# Patient Record
Sex: FEMALE | Race: WHITE | NOT HISPANIC OR LATINO
[De-identification: names, ages, dates, MRNs, and addresses within clinical notes are randomized per-mention and may not be internally consistent; named-entity substitution may affect disease eponyms.]

---

## 2017-01-05 ENCOUNTER — RX RENEWAL (OUTPATIENT)
Age: 77
End: 2017-01-05

## 2017-01-05 ENCOUNTER — APPOINTMENT (OUTPATIENT)
Dept: FAMILY MEDICINE | Facility: CLINIC | Age: 77
End: 2017-01-05

## 2017-01-05 DIAGNOSIS — Z87.891 PERSONAL HISTORY OF NICOTINE DEPENDENCE: ICD-10-CM

## 2017-01-05 DIAGNOSIS — R05 COUGH: ICD-10-CM

## 2017-01-05 DIAGNOSIS — Z00.00 ENCOUNTER FOR GENERAL ADULT MEDICAL EXAMINATION W/OUT ABNORMAL FINDINGS: ICD-10-CM

## 2017-01-30 ENCOUNTER — RX RENEWAL (OUTPATIENT)
Age: 77
End: 2017-01-30

## 2017-06-08 ENCOUNTER — RX RENEWAL (OUTPATIENT)
Age: 77
End: 2017-06-08

## 2017-06-13 ENCOUNTER — APPOINTMENT (OUTPATIENT)
Dept: FAMILY MEDICINE | Facility: CLINIC | Age: 77
End: 2017-06-13

## 2017-06-15 LAB
HBA1C MFR BLD HPLC: 6.3
LDLC SERPL CALC-MCNC: 87

## 2017-07-06 ENCOUNTER — APPOINTMENT (OUTPATIENT)
Dept: FAMILY MEDICINE | Facility: CLINIC | Age: 77
End: 2017-07-06

## 2017-07-06 VITALS
HEIGHT: 64 IN | BODY MASS INDEX: 31.07 KG/M2 | HEART RATE: 78 BPM | WEIGHT: 182 LBS | SYSTOLIC BLOOD PRESSURE: 120 MMHG | RESPIRATION RATE: 16 BRPM | DIASTOLIC BLOOD PRESSURE: 60 MMHG

## 2017-07-06 DIAGNOSIS — Z82.62 FAMILY HISTORY OF OSTEOPOROSIS: ICD-10-CM

## 2017-07-06 DIAGNOSIS — K76.0 FATTY (CHANGE OF) LIVER, NOT ELSEWHERE CLASSIFIED: ICD-10-CM

## 2017-07-06 DIAGNOSIS — Z87.898 PERSONAL HISTORY OF OTHER SPECIFIED CONDITIONS: ICD-10-CM

## 2017-07-06 DIAGNOSIS — Z80.42 FAMILY HISTORY OF MALIGNANT NEOPLASM OF PROSTATE: ICD-10-CM

## 2017-07-06 DIAGNOSIS — Z87.448 PERSONAL HISTORY OF OTHER DISEASES OF URINARY SYSTEM: ICD-10-CM

## 2017-07-06 DIAGNOSIS — Z82.49 FAMILY HISTORY OF ISCHEMIC HEART DISEASE AND OTHER DISEASES OF THE CIRCULATORY SYSTEM: ICD-10-CM

## 2017-08-09 ENCOUNTER — RX RENEWAL (OUTPATIENT)
Age: 77
End: 2017-08-09

## 2017-08-21 ENCOUNTER — RX RENEWAL (OUTPATIENT)
Age: 77
End: 2017-08-21

## 2017-09-20 ENCOUNTER — APPOINTMENT (OUTPATIENT)
Dept: CARDIOLOGY | Facility: CLINIC | Age: 77
End: 2017-09-20
Payer: MEDICARE

## 2017-09-20 ENCOUNTER — NON-APPOINTMENT (OUTPATIENT)
Age: 77
End: 2017-09-20

## 2017-09-20 VITALS
DIASTOLIC BLOOD PRESSURE: 72 MMHG | OXYGEN SATURATION: 94 % | HEART RATE: 93 BPM | SYSTOLIC BLOOD PRESSURE: 125 MMHG | HEIGHT: 64 IN

## 2017-09-20 PROCEDURE — 93224 XTRNL ECG REC UP TO 48 HRS: CPT | Mod: 59

## 2017-09-20 PROCEDURE — 93000 ELECTROCARDIOGRAM COMPLETE: CPT | Mod: 59

## 2017-09-20 PROCEDURE — 99215 OFFICE O/P EST HI 40 MIN: CPT

## 2017-10-03 ENCOUNTER — APPOINTMENT (OUTPATIENT)
Dept: CARDIOLOGY | Facility: CLINIC | Age: 77
End: 2017-10-03
Payer: MEDICARE

## 2017-10-03 PROCEDURE — 93306 TTE W/DOPPLER COMPLETE: CPT

## 2017-10-12 ENCOUNTER — MEDICATION RENEWAL (OUTPATIENT)
Age: 77
End: 2017-10-12

## 2017-10-20 ENCOUNTER — RX RENEWAL (OUTPATIENT)
Age: 77
End: 2017-10-20

## 2017-11-07 ENCOUNTER — CLINICAL ADVICE (OUTPATIENT)
Age: 77
End: 2017-11-07

## 2017-11-07 ENCOUNTER — APPOINTMENT (OUTPATIENT)
Dept: CARDIOLOGY | Facility: CLINIC | Age: 77
End: 2017-11-07
Payer: MEDICARE

## 2017-11-07 PROCEDURE — A9500: CPT

## 2017-11-07 PROCEDURE — 93015 CV STRESS TEST SUPVJ I&R: CPT

## 2017-11-07 PROCEDURE — 78452 HT MUSCLE IMAGE SPECT MULT: CPT

## 2017-11-16 ENCOUNTER — OUTPATIENT (OUTPATIENT)
Dept: OUTPATIENT SERVICES | Facility: HOSPITAL | Age: 77
LOS: 1 days | End: 2017-11-16
Payer: MEDICARE

## 2017-11-16 VITALS — HEIGHT: 64 IN | WEIGHT: 173.06 LBS

## 2017-11-16 VITALS
HEART RATE: 84 BPM | OXYGEN SATURATION: 94 % | RESPIRATION RATE: 18 BRPM | HEIGHT: 64 IN | DIASTOLIC BLOOD PRESSURE: 62 MMHG | TEMPERATURE: 98 F | SYSTOLIC BLOOD PRESSURE: 143 MMHG | WEIGHT: 173.06 LBS

## 2017-11-16 DIAGNOSIS — Z98.89 OTHER SPECIFIED POSTPROCEDURAL STATES: Chronic | ICD-10-CM

## 2017-11-16 DIAGNOSIS — Z01.810 ENCOUNTER FOR PREPROCEDURAL CARDIOVASCULAR EXAMINATION: ICD-10-CM

## 2017-11-16 DIAGNOSIS — Z98.890 OTHER SPECIFIED POSTPROCEDURAL STATES: Chronic | ICD-10-CM

## 2017-11-16 LAB
ANION GAP SERPL CALC-SCNC: 11 MMOL/L — SIGNIFICANT CHANGE UP (ref 5–17)
APTT BLD: 30.9 SEC — SIGNIFICANT CHANGE UP (ref 27.5–37.4)
BASOPHILS # BLD AUTO: 0 K/UL — SIGNIFICANT CHANGE UP (ref 0–0.2)
BASOPHILS NFR BLD AUTO: 0.4 % — SIGNIFICANT CHANGE UP (ref 0–2)
BUN SERPL-MCNC: 16 MG/DL — SIGNIFICANT CHANGE UP (ref 8–20)
CALCIUM SERPL-MCNC: 9.7 MG/DL — SIGNIFICANT CHANGE UP (ref 8.6–10.2)
CHLORIDE SERPL-SCNC: 104 MMOL/L — SIGNIFICANT CHANGE UP (ref 98–107)
CO2 SERPL-SCNC: 26 MMOL/L — SIGNIFICANT CHANGE UP (ref 22–29)
CREAT SERPL-MCNC: 1.02 MG/DL — SIGNIFICANT CHANGE UP (ref 0.5–1.3)
EOSINOPHIL # BLD AUTO: 0.1 K/UL — SIGNIFICANT CHANGE UP (ref 0–0.5)
EOSINOPHIL NFR BLD AUTO: 1.1 % — SIGNIFICANT CHANGE UP (ref 0–6)
GLUCOSE SERPL-MCNC: 114 MG/DL — SIGNIFICANT CHANGE UP (ref 70–115)
HCT VFR BLD CALC: 39.1 % — SIGNIFICANT CHANGE UP (ref 37–47)
HGB BLD-MCNC: 13 G/DL — SIGNIFICANT CHANGE UP (ref 12–16)
INR BLD: 0.98 RATIO — SIGNIFICANT CHANGE UP (ref 0.88–1.16)
LYMPHOCYTES # BLD AUTO: 1.3 K/UL — SIGNIFICANT CHANGE UP (ref 1–4.8)
LYMPHOCYTES # BLD AUTO: 23.4 % — SIGNIFICANT CHANGE UP (ref 20–55)
MCHC RBC-ENTMCNC: 31 PG — SIGNIFICANT CHANGE UP (ref 27–31)
MCHC RBC-ENTMCNC: 33.2 G/DL — SIGNIFICANT CHANGE UP (ref 32–36)
MCV RBC AUTO: 93.1 FL — SIGNIFICANT CHANGE UP (ref 81–99)
MONOCYTES # BLD AUTO: 0.5 K/UL — SIGNIFICANT CHANGE UP (ref 0–0.8)
MONOCYTES NFR BLD AUTO: 9.2 % — SIGNIFICANT CHANGE UP (ref 3–10)
NEUTROPHILS # BLD AUTO: 3.6 K/UL — SIGNIFICANT CHANGE UP (ref 1.8–8)
NEUTROPHILS NFR BLD AUTO: 65.7 % — SIGNIFICANT CHANGE UP (ref 37–73)
PLATELET # BLD AUTO: 188 K/UL — SIGNIFICANT CHANGE UP (ref 150–400)
POTASSIUM SERPL-MCNC: 4.5 MMOL/L — SIGNIFICANT CHANGE UP (ref 3.5–5.3)
POTASSIUM SERPL-SCNC: 4.5 MMOL/L — SIGNIFICANT CHANGE UP (ref 3.5–5.3)
PROTHROM AB SERPL-ACNC: 10.8 SEC — SIGNIFICANT CHANGE UP (ref 9.8–12.7)
RBC # BLD: 4.2 M/UL — LOW (ref 4.4–5.2)
RBC # FLD: 13.6 % — SIGNIFICANT CHANGE UP (ref 11–15.6)
SODIUM SERPL-SCNC: 141 MMOL/L — SIGNIFICANT CHANGE UP (ref 135–145)
WBC # BLD: 5.5 K/UL — SIGNIFICANT CHANGE UP (ref 4.8–10.8)
WBC # FLD AUTO: 5.5 K/UL — SIGNIFICANT CHANGE UP (ref 4.8–10.8)

## 2017-11-16 PROCEDURE — 93010 ELECTROCARDIOGRAM REPORT: CPT

## 2017-11-16 PROCEDURE — 85730 THROMBOPLASTIN TIME PARTIAL: CPT

## 2017-11-16 PROCEDURE — 80048 BASIC METABOLIC PNL TOTAL CA: CPT

## 2017-11-16 PROCEDURE — 36415 COLL VENOUS BLD VENIPUNCTURE: CPT

## 2017-11-16 PROCEDURE — G0463: CPT

## 2017-11-16 PROCEDURE — 85027 COMPLETE CBC AUTOMATED: CPT

## 2017-11-16 PROCEDURE — 85610 PROTHROMBIN TIME: CPT

## 2017-11-16 PROCEDURE — 93005 ELECTROCARDIOGRAM TRACING: CPT

## 2017-11-16 NOTE — H&P PST ADULT - ASSESSMENT
76 yo female with h/o COPD, with c/o MENCHACA and abnormal stress test referred for LHC to R/O CAD  -NPO after midnight prior to procedure  -Proceed with LHC as planned

## 2017-11-16 NOTE — H&P PST ADULT - RS GEN PE MLT RESP DETAILS PC
no rhonchi/normal/breath sounds equal/no rales/clear to auscultation bilaterally/respirations non-labored

## 2017-11-16 NOTE — H&P PST ADULT - HISTORY OF PRESENT ILLNESS
78 yo female with h/o COPD, HLD who presents for PST for C. She has MENCHACA and was found to have an abnormal Echo and was referred for stress test which was abnormal.  She reports her SOB is worsening with exertion, she does have COPD, but does state she is more SOB lately. No chest tightness.    Echo: 10/6/2017: LVEF 45-50%. Normal right ventricular size and function. Mild mitral valve regurgitation. Global diffuse hypokinesis. Grade 1 diastolic dysfunction.    Stress test: LVEF 60%. Moderate ischemia in RCA territory. The left ventricle was normal in size. Inability to exercise due to decrease exercise capacity and COPD.

## 2017-11-16 NOTE — H&P PST ADULT - PMH
Abnormal stress test  11-7-2017  CAD (coronary artery disease)    Diastolic dysfunction  grade 1  Emphysema    GERD (gastroesophageal reflux disease)    HLD (hyperlipidemia)    Hyperlipidemia    Incisional hernia    MR (mitral regurgitation)  mild  Obese

## 2017-11-16 NOTE — ASU PATIENT PROFILE, ADULT - PMH
Abnormal stress test  11-7-2017  CAD (coronary artery disease)    Diastolic dysfunction  grade 1  Emphysema    HLD (hyperlipidemia)    Hyperlipidemia    Incisional hernia    MR (mitral regurgitation)  mild  Obese Abnormal stress test  11-7-2017  CAD (coronary artery disease)    Diastolic dysfunction  grade 1  Emphysema    GERD (gastroesophageal reflux disease)    HLD (hyperlipidemia)    Hyperlipidemia    Incisional hernia    MR (mitral regurgitation)  mild  Obese

## 2017-11-21 ENCOUNTER — OUTPATIENT (OUTPATIENT)
Dept: OUTPATIENT SERVICES | Facility: HOSPITAL | Age: 77
LOS: 1 days | End: 2017-11-21
Payer: MEDICARE

## 2017-11-21 ENCOUNTER — TRANSCRIPTION ENCOUNTER (OUTPATIENT)
Age: 77
End: 2017-11-21

## 2017-11-21 VITALS
OXYGEN SATURATION: 97 % | HEART RATE: 64 BPM | SYSTOLIC BLOOD PRESSURE: 147 MMHG | DIASTOLIC BLOOD PRESSURE: 62 MMHG | RESPIRATION RATE: 16 BRPM

## 2017-11-21 VITALS
HEART RATE: 69 BPM | RESPIRATION RATE: 16 BRPM | SYSTOLIC BLOOD PRESSURE: 147 MMHG | TEMPERATURE: 98 F | DIASTOLIC BLOOD PRESSURE: 67 MMHG | OXYGEN SATURATION: 97 %

## 2017-11-21 DIAGNOSIS — Z01.810 ENCOUNTER FOR PREPROCEDURAL CARDIOVASCULAR EXAMINATION: ICD-10-CM

## 2017-11-21 DIAGNOSIS — R06.09 OTHER FORMS OF DYSPNEA: ICD-10-CM

## 2017-11-21 DIAGNOSIS — Z98.89 OTHER SPECIFIED POSTPROCEDURAL STATES: Chronic | ICD-10-CM

## 2017-11-21 DIAGNOSIS — Z98.890 OTHER SPECIFIED POSTPROCEDURAL STATES: Chronic | ICD-10-CM

## 2017-11-21 PROCEDURE — 93458 L HRT ARTERY/VENTRICLE ANGIO: CPT

## 2017-11-21 PROCEDURE — 93458 L HRT ARTERY/VENTRICLE ANGIO: CPT | Mod: 26

## 2017-11-21 PROCEDURE — C1887: CPT

## 2017-11-21 PROCEDURE — 99152 MOD SED SAME PHYS/QHP 5/>YRS: CPT

## 2017-11-21 PROCEDURE — C1894: CPT

## 2017-11-21 PROCEDURE — C1769: CPT

## 2017-11-21 RX ORDER — ASPIRIN/CALCIUM CARB/MAGNESIUM 324 MG
81 TABLET ORAL ONCE
Qty: 0 | Refills: 0 | Status: COMPLETED | OUTPATIENT
Start: 2017-11-21 | End: 2017-11-21

## 2017-11-21 NOTE — DISCHARGE NOTE ADULT - MEDICATION SUMMARY - MEDICATIONS TO TAKE
I will START or STAY ON the medications listed below when I get home from the hospital:    aspirin 81 mg oral tablet  -- 1 tab(s) by mouth once a day  -- Indication: For antiplatelet    atorvastatin 20 mg oral tablet  -- 1 tab(s) by mouth once a day  -- Indication: For hyperlipidemia    alendronate 35 mg oral tablet  -- 1 tab(s) by mouth once a week  -- Indication: For Osteoporosis    Advair Diskus 250 mcg-50 mcg inhalation powder  -- 1 puff(s) inhaled 2 times a day  -- Indication: For copd    omeprazole 20 mg oral delayed release capsule  -- 1 cap(s) by mouth once a day  -- Indication: For GERD    Vitamin D2 50,000 intl units (1.25 mg) oral capsule  -- 1 cap(s) by mouth once a week  -- Indication: For supplement

## 2017-11-21 NOTE — DISCHARGE NOTE ADULT - CARE PROVIDER_API CALL
Aleja Taylor), Cardiology; Internal Medicine  [address] [id number]  Phone: [phone]  Fax: [phone]

## 2017-11-21 NOTE — DISCHARGE NOTE ADULT - PLAN OF CARE
Remain free of worsening CAD Restricted use with no heavy lifting of affected arm for 48 hours.  No submerging the arm in water for 48 hours.  You may start showering today.  Call your doctor for any bleeding, swelling, loss of sensation in the hand or fingers, or fingers turning blue.  If heavy bleeding or large lumps form, hold pressure at the spot and come to the Emergency Room.  Follow up with Dr. Taylor in one to two weeks.

## 2017-11-21 NOTE — DISCHARGE NOTE ADULT - PATIENT PORTAL LINK FT
“You can access the [facility] Patient Portal, offered by [facility], by registering with the following website: [email or web address]”

## 2017-11-21 NOTE — DISCHARGE NOTE ADULT - NS AS ACTIVITY OBS
Walking-Outdoors allowed/Showering allowed/Do not make important decisions/Stairs allowed/No Heavy lifting/straining/Walking-Indoors allowed

## 2017-11-21 NOTE — DISCHARGE NOTE ADULT - CARE PLAN
Principal Discharge DX:	CAD (coronary artery disease)  Goal:	Remain free of worsening CAD  Instructions for follow-up, activity and diet:	Restricted use with no heavy lifting of affected arm for 48 hours.  No submerging the arm in water for 48 hours.  You may start showering today.  Call your doctor for any bleeding, swelling, loss of sensation in the hand or fingers, or fingers turning blue.  If heavy bleeding or large lumps form, hold pressure at the spot and come to the Emergency Room.  Follow up with Dr. Taylor in one to two weeks.

## 2017-11-29 ENCOUNTER — RX RENEWAL (OUTPATIENT)
Age: 77
End: 2017-11-29

## 2018-01-02 ENCOUNTER — APPOINTMENT (OUTPATIENT)
Dept: FAMILY MEDICINE | Facility: CLINIC | Age: 78
End: 2018-01-02
Payer: MEDICARE

## 2018-01-02 PROCEDURE — 36415 COLL VENOUS BLD VENIPUNCTURE: CPT

## 2018-01-03 LAB
HBA1C MFR BLD HPLC: 6.3
LDLC SERPL CALC-MCNC: 58

## 2018-02-23 ENCOUNTER — MEDICATION RENEWAL (OUTPATIENT)
Age: 78
End: 2018-02-23

## 2018-06-28 ENCOUNTER — RX RENEWAL (OUTPATIENT)
Age: 78
End: 2018-06-28

## 2018-07-17 ENCOUNTER — APPOINTMENT (OUTPATIENT)
Dept: SURGERY | Facility: CLINIC | Age: 78
End: 2018-07-17
Payer: MEDICARE

## 2018-07-17 VITALS
DIASTOLIC BLOOD PRESSURE: 72 MMHG | HEIGHT: 64 IN | SYSTOLIC BLOOD PRESSURE: 140 MMHG | HEART RATE: 70 BPM | WEIGHT: 177 LBS | BODY MASS INDEX: 30.22 KG/M2 | RESPIRATION RATE: 15 BRPM

## 2018-07-17 PROCEDURE — 99203 OFFICE O/P NEW LOW 30 MIN: CPT

## 2018-07-17 PROCEDURE — 99213 OFFICE O/P EST LOW 20 MIN: CPT

## 2018-07-23 PROBLEM — K21.9 GASTRO-ESOPHAGEAL REFLUX DISEASE WITHOUT ESOPHAGITIS: Chronic | Status: ACTIVE | Noted: 2017-11-16

## 2018-07-23 PROBLEM — I34.0 NONRHEUMATIC MITRAL (VALVE) INSUFFICIENCY: Chronic | Status: ACTIVE | Noted: 2017-11-16

## 2018-07-23 PROBLEM — E78.5 HYPERLIPIDEMIA, UNSPECIFIED: Chronic | Status: ACTIVE | Noted: 2017-11-16

## 2018-07-23 PROBLEM — I25.10 ATHEROSCLEROTIC HEART DISEASE OF NATIVE CORONARY ARTERY WITHOUT ANGINA PECTORIS: Chronic | Status: ACTIVE | Noted: 2017-11-16

## 2018-07-30 ENCOUNTER — OUTPATIENT (OUTPATIENT)
Dept: OUTPATIENT SERVICES | Facility: HOSPITAL | Age: 78
LOS: 1 days | End: 2018-07-30
Payer: MEDICARE

## 2018-07-30 VITALS
DIASTOLIC BLOOD PRESSURE: 75 MMHG | RESPIRATION RATE: 16 BRPM | HEART RATE: 78 BPM | TEMPERATURE: 98 F | HEIGHT: 64 IN | WEIGHT: 171.96 LBS | OXYGEN SATURATION: 96 % | SYSTOLIC BLOOD PRESSURE: 158 MMHG

## 2018-07-30 DIAGNOSIS — Z98.89 OTHER SPECIFIED POSTPROCEDURAL STATES: Chronic | ICD-10-CM

## 2018-07-30 DIAGNOSIS — I25.10 ATHEROSCLEROTIC HEART DISEASE OF NATIVE CORONARY ARTERY WITHOUT ANGINA PECTORIS: ICD-10-CM

## 2018-07-30 DIAGNOSIS — Z98.890 OTHER SPECIFIED POSTPROCEDURAL STATES: Chronic | ICD-10-CM

## 2018-07-30 DIAGNOSIS — J43.9 EMPHYSEMA, UNSPECIFIED: ICD-10-CM

## 2018-07-30 DIAGNOSIS — K80.20 CALCULUS OF GALLBLADDER WITHOUT CHOLECYSTITIS WITHOUT OBSTRUCTION: ICD-10-CM

## 2018-07-30 DIAGNOSIS — Z01.818 ENCOUNTER FOR OTHER PREPROCEDURAL EXAMINATION: ICD-10-CM

## 2018-07-30 DIAGNOSIS — Z29.9 ENCOUNTER FOR PROPHYLACTIC MEASURES, UNSPECIFIED: ICD-10-CM

## 2018-07-30 LAB
ALBUMIN SERPL ELPH-MCNC: 4.6 G/DL — SIGNIFICANT CHANGE UP (ref 3.3–5)
ALP SERPL-CCNC: 75 U/L — SIGNIFICANT CHANGE UP (ref 40–120)
ALT FLD-CCNC: 25 U/L — SIGNIFICANT CHANGE UP (ref 10–45)
ANION GAP SERPL CALC-SCNC: 14 MMOL/L — SIGNIFICANT CHANGE UP (ref 5–17)
AST SERPL-CCNC: 25 U/L — SIGNIFICANT CHANGE UP (ref 10–40)
BILIRUB SERPL-MCNC: 0.5 MG/DL — SIGNIFICANT CHANGE UP (ref 0.2–1.2)
BUN SERPL-MCNC: 18 MG/DL — SIGNIFICANT CHANGE UP (ref 7–23)
CALCIUM SERPL-MCNC: 10.3 MG/DL — SIGNIFICANT CHANGE UP (ref 8.4–10.5)
CHLORIDE SERPL-SCNC: 105 MMOL/L — SIGNIFICANT CHANGE UP (ref 96–108)
CO2 SERPL-SCNC: 25 MMOL/L — SIGNIFICANT CHANGE UP (ref 22–31)
CREAT SERPL-MCNC: 1.14 MG/DL — SIGNIFICANT CHANGE UP (ref 0.5–1.3)
GLUCOSE SERPL-MCNC: 91 MG/DL — SIGNIFICANT CHANGE UP (ref 70–99)
HCT VFR BLD CALC: 43.1 % — SIGNIFICANT CHANGE UP (ref 34.5–45)
HGB BLD-MCNC: 14.2 G/DL — SIGNIFICANT CHANGE UP (ref 11.5–15.5)
MCHC RBC-ENTMCNC: 30.8 PG — SIGNIFICANT CHANGE UP (ref 27–34)
MCHC RBC-ENTMCNC: 32.9 GM/DL — SIGNIFICANT CHANGE UP (ref 32–36)
MCV RBC AUTO: 93.5 FL — SIGNIFICANT CHANGE UP (ref 80–100)
PLATELET # BLD AUTO: 221 K/UL — SIGNIFICANT CHANGE UP (ref 150–400)
POTASSIUM SERPL-MCNC: 4.1 MMOL/L — SIGNIFICANT CHANGE UP (ref 3.5–5.3)
POTASSIUM SERPL-SCNC: 4.1 MMOL/L — SIGNIFICANT CHANGE UP (ref 3.5–5.3)
PROT SERPL-MCNC: 7.8 G/DL — SIGNIFICANT CHANGE UP (ref 6–8.3)
RBC # BLD: 4.61 M/UL — SIGNIFICANT CHANGE UP (ref 3.8–5.2)
RBC # FLD: 13.5 % — SIGNIFICANT CHANGE UP (ref 10.3–14.5)
SODIUM SERPL-SCNC: 144 MMOL/L — SIGNIFICANT CHANGE UP (ref 135–145)
WBC # BLD: 6.28 K/UL — SIGNIFICANT CHANGE UP (ref 3.8–10.5)
WBC # FLD AUTO: 6.28 K/UL — SIGNIFICANT CHANGE UP (ref 3.8–10.5)

## 2018-07-30 PROCEDURE — 80053 COMPREHEN METABOLIC PANEL: CPT

## 2018-07-30 PROCEDURE — G0463: CPT

## 2018-07-30 PROCEDURE — 85027 COMPLETE CBC AUTOMATED: CPT

## 2018-07-30 RX ORDER — ATORVASTATIN CALCIUM 80 MG/1
1 TABLET, FILM COATED ORAL
Qty: 0 | Refills: 0 | COMMUNITY

## 2018-07-30 RX ORDER — CEFAZOLIN SODIUM 1 G
2000 VIAL (EA) INJECTION ONCE
Qty: 0 | Refills: 0 | Status: DISCONTINUED | OUTPATIENT
Start: 2018-08-15 | End: 2018-08-30

## 2018-07-30 RX ORDER — ASPIRIN/CALCIUM CARB/MAGNESIUM 324 MG
1 TABLET ORAL
Qty: 0 | Refills: 0 | COMMUNITY

## 2018-07-30 NOTE — H&P PST ADULT - NSANTHOSAYNRD_GEN_A_CORE
No. CARL screening performed.  STOP BANG Legend: 0-2 = LOW Risk; 3-4 = INTERMEDIATE Risk; 5-8 = HIGH Risk

## 2018-07-30 NOTE — H&P PST ADULT - ASSESSMENT

## 2018-07-30 NOTE — H&P PST ADULT - PMH
Abnormal stress test  11-7-2017, cath done 11/21/17 was negitive  CAD (coronary artery disease)    Cholelithiasis  newly diagnosed.  Emphysema    GERD (gastroesophageal reflux disease)    HLD (hyperlipidemia)    Incisional hernia    MR (mitral regurgitation)  mild  Obese    Osteopenia after menopause

## 2018-07-31 ENCOUNTER — APPOINTMENT (OUTPATIENT)
Dept: CARDIOLOGY | Facility: CLINIC | Age: 78
End: 2018-07-31
Payer: MEDICARE

## 2018-07-31 VITALS
SYSTOLIC BLOOD PRESSURE: 118 MMHG | WEIGHT: 176 LBS | BODY MASS INDEX: 30.05 KG/M2 | DIASTOLIC BLOOD PRESSURE: 66 MMHG | OXYGEN SATURATION: 94 % | HEIGHT: 64 IN | HEART RATE: 82 BPM

## 2018-07-31 DIAGNOSIS — Z01.810 ENCOUNTER FOR PREPROCEDURAL CARDIOVASCULAR EXAMINATION: ICD-10-CM

## 2018-07-31 PROBLEM — R94.39 ABNORMAL RESULT OF OTHER CARDIOVASCULAR FUNCTION STUDY: Chronic | Status: ACTIVE | Noted: 2017-11-16

## 2018-07-31 PROBLEM — I51.9 HEART DISEASE, UNSPECIFIED: Chronic | Status: INACTIVE | Noted: 2017-11-16 | Resolved: 2018-07-30

## 2018-07-31 PROCEDURE — 99215 OFFICE O/P EST HI 40 MIN: CPT

## 2018-07-31 PROCEDURE — 93000 ELECTROCARDIOGRAM COMPLETE: CPT

## 2018-08-02 LAB — NT-PROBNP SERPL-MCNC: 243 PG/ML

## 2018-08-15 ENCOUNTER — RESULT REVIEW (OUTPATIENT)
Age: 78
End: 2018-08-15

## 2018-08-15 ENCOUNTER — OUTPATIENT (OUTPATIENT)
Dept: OUTPATIENT SERVICES | Facility: HOSPITAL | Age: 78
LOS: 1 days | End: 2018-08-15
Payer: MEDICARE

## 2018-08-15 VITALS
OXYGEN SATURATION: 95 % | SYSTOLIC BLOOD PRESSURE: 150 MMHG | RESPIRATION RATE: 16 BRPM | DIASTOLIC BLOOD PRESSURE: 63 MMHG | HEART RATE: 81 BPM | WEIGHT: 171.96 LBS | HEIGHT: 64 IN | TEMPERATURE: 98 F

## 2018-08-15 VITALS
RESPIRATION RATE: 14 BRPM | HEART RATE: 66 BPM | SYSTOLIC BLOOD PRESSURE: 140 MMHG | OXYGEN SATURATION: 99 % | DIASTOLIC BLOOD PRESSURE: 60 MMHG

## 2018-08-15 DIAGNOSIS — K80.20 CALCULUS OF GALLBLADDER WITHOUT CHOLECYSTITIS WITHOUT OBSTRUCTION: ICD-10-CM

## 2018-08-15 DIAGNOSIS — Z98.89 OTHER SPECIFIED POSTPROCEDURAL STATES: Chronic | ICD-10-CM

## 2018-08-15 DIAGNOSIS — Z98.890 OTHER SPECIFIED POSTPROCEDURAL STATES: Chronic | ICD-10-CM

## 2018-08-15 PROCEDURE — 88304 TISSUE EXAM BY PATHOLOGIST: CPT | Mod: 26

## 2018-08-15 PROCEDURE — 88304 TISSUE EXAM BY PATHOLOGIST: CPT

## 2018-08-15 PROCEDURE — 47562 LAPAROSCOPIC CHOLECYSTECTOMY: CPT

## 2018-08-15 RX ORDER — FENTANYL CITRATE 50 UG/ML
25 INJECTION INTRAVENOUS
Qty: 0 | Refills: 0 | Status: DISCONTINUED | OUTPATIENT
Start: 2018-08-15 | End: 2018-08-15

## 2018-08-15 RX ORDER — ASPIRIN/CALCIUM CARB/MAGNESIUM 324 MG
1 TABLET ORAL
Qty: 0 | Refills: 0 | COMMUNITY

## 2018-08-15 RX ORDER — LIDOCAINE HCL 20 MG/ML
0.2 VIAL (ML) INJECTION ONCE
Qty: 0 | Refills: 0 | Status: DISCONTINUED | OUTPATIENT
Start: 2018-08-15 | End: 2018-08-15

## 2018-08-15 RX ORDER — SODIUM CHLORIDE 9 MG/ML
3 INJECTION INTRAMUSCULAR; INTRAVENOUS; SUBCUTANEOUS EVERY 8 HOURS
Qty: 0 | Refills: 0 | Status: DISCONTINUED | OUTPATIENT
Start: 2018-08-15 | End: 2018-08-15

## 2018-08-15 RX ORDER — ONDANSETRON 8 MG/1
4 TABLET, FILM COATED ORAL ONCE
Qty: 0 | Refills: 0 | Status: DISCONTINUED | OUTPATIENT
Start: 2018-08-15 | End: 2018-08-15

## 2018-08-15 RX ADMIN — SODIUM CHLORIDE 3 MILLILITER(S): 9 INJECTION INTRAMUSCULAR; INTRAVENOUS; SUBCUTANEOUS at 06:46

## 2018-08-15 NOTE — ASU DISCHARGE PLAN (ADULT/PEDIATRIC). - ITEMS TO FOLLOWUP WITH YOUR PHYSICIAN'S
You may restart Aspirin on Thursday 8/16.  Follow-up with Dr. Castle within 7-10 days.  Please call office for appointment.

## 2018-08-15 NOTE — ASU DISCHARGE PLAN (ADULT/PEDIATRIC). - MEDICATION SUMMARY - MEDICATIONS TO STOP TAKING
I will STOP taking the medications listed below when I get home from the hospital:    aspirin 81 mg oral tablet  -- 1 tab(s) by mouth once a day (at bedtime)

## 2018-08-15 NOTE — ASU DISCHARGE PLAN (ADULT/PEDIATRIC). - MEDICATION SUMMARY - MEDICATIONS TO TAKE
I will START or STAY ON the medications listed below when I get home from the hospital:    Percocet 5/325 oral tablet  -- 1 tab(s) by mouth every 4-6 hours MDD:8  -- Indication: For pain    atorvastatin 20 mg oral tablet  -- 1 tab(s) by mouth once a day (at bedtime)  -- Indication: For Cholesterol    alendronate 35 mg oral tablet  -- 1 tab(s) by mouth once a week  -- Indication: For bones    Advair Diskus 250 mcg-50 mcg inhalation powder  -- 1 puff(s) inhaled 2 times a day  -- Indication: For breathing    omeprazole 20 mg oral delayed release capsule  -- 1 cap(s) by mouth once a day  -- Indication: For reflux    Vitamin D2 50,000 intl units (1.25 mg) oral capsule  -- 1 cap(s) by mouth once a week  -- Indication: For supplement

## 2018-08-17 PROBLEM — M81.0 AGE-RELATED OSTEOPOROSIS WITHOUT CURRENT PATHOLOGICAL FRACTURE: Chronic | Status: ACTIVE | Noted: 2018-07-30

## 2018-08-17 PROBLEM — K80.20 CALCULUS OF GALLBLADDER WITHOUT CHOLECYSTITIS WITHOUT OBSTRUCTION: Chronic | Status: ACTIVE | Noted: 2018-07-30

## 2018-08-30 ENCOUNTER — APPOINTMENT (OUTPATIENT)
Dept: SURGERY | Facility: CLINIC | Age: 78
End: 2018-08-30
Payer: MEDICARE

## 2018-08-30 VITALS
DIASTOLIC BLOOD PRESSURE: 71 MMHG | HEIGHT: 64 IN | HEART RATE: 80 BPM | SYSTOLIC BLOOD PRESSURE: 121 MMHG | BODY MASS INDEX: 28.68 KG/M2 | RESPIRATION RATE: 15 BRPM | WEIGHT: 168 LBS

## 2018-08-30 DIAGNOSIS — K80.20 CALCULUS OF GALLBLADDER W/OUT CHOLECYSTITIS W/OUT OBSTRUCTION: ICD-10-CM

## 2018-08-30 DIAGNOSIS — K43.2 INCISIONAL HERNIA W/OUT OBSTRUCTION OR GANGRENE: ICD-10-CM

## 2018-08-30 PROCEDURE — 99024 POSTOP FOLLOW-UP VISIT: CPT

## 2019-04-02 ENCOUNTER — APPOINTMENT (OUTPATIENT)
Dept: CARDIOLOGY | Facility: CLINIC | Age: 79
End: 2019-04-02
Payer: MEDICARE

## 2019-04-02 VITALS
HEIGHT: 64 IN | DIASTOLIC BLOOD PRESSURE: 61 MMHG | OXYGEN SATURATION: 95 % | WEIGHT: 168 LBS | BODY MASS INDEX: 28.68 KG/M2 | SYSTOLIC BLOOD PRESSURE: 111 MMHG | RESPIRATION RATE: 16 BRPM | HEART RATE: 86 BPM

## 2019-04-02 PROCEDURE — 93000 ELECTROCARDIOGRAM COMPLETE: CPT

## 2019-04-02 PROCEDURE — 99215 OFFICE O/P EST HI 40 MIN: CPT

## 2019-04-02 NOTE — REVIEW OF SYSTEMS
[see HPI] : see HPI [Shortness Of Breath] : shortness of breath [Dyspnea on exertion] : dyspnea during exertion [Chest  Pressure] : no chest pressure [Chest Pain] : no chest pain [Lower Ext Edema] : no extremity edema [Leg Claudication] : no intermittent leg claudication [Palpitations] : no palpitations [Negative] : Heme/Lymph [FreeTextEntry2] : swelling of feet and leg cramps.

## 2019-04-02 NOTE — DISCUSSION/SUMMARY
[Patient] : the patient [Risks] : risks [Benefits] : benefits [Alternatives] : alternatives [___ Month(s)] : [unfilled] month(s) [With Me] : with me [FreeTextEntry1] : 74 F with prior smoking, COPD/with abnormal EKG and dyspnea on exertion\par 1) PVCs: 72 hr HOLTEr. \par 2) Abnormnal EKG: dyspnea one xertion: emphyseamea. stress echo with definity perfusion [place] scan \par 3) Diastolic heart failure: EF 45-50%.  2D echo with strain imaging./ \par 4)CAD preventive visit:  last stress test 2017. mild coronary artery disease \par 5)  carotid plaque: Mild atherosclerosis. ct statsin .\par 6:  Moderate MR: r will repeat echo  repeat echo. \par

## 2019-04-02 NOTE — HISTORY OF PRESENT ILLNESS
[FreeTextEntry1] : F/u for abnormal EKG and MENCHACA\par tolerated surgery well. it was gall bladder surgery. no chest pain. no dyspnea. no  palopitations. No Swelling of feet. \par dyspnea on exertion on walking for 1 block. She has emphyseam. . no chest pain. no palpitations. \par \par \par old note: reason for f/u is for Pre-operative cardiovascular risk evaluation and management for gall bladder surgery\par jhas gall stones. \par \par no chest jia. no dyspnea./ no dizzines. \par \par \par old note: no new symptoms. persistent dyspnea . No chest pain. No syncope. +  palpitations. intermittent. few months. 2-3 episodes in a week. and when she walks upstairs. \par No syncope. \par \par OLD Note:This is a 74 F with history of smoking, COPD/Emphysema, with abnormal ECG. \par Patient states she feel shortness of breathe after climbing stairs.  But she thinks it her COPD. No chest tightness. The Symptoms resolve after resting. for the last few days the symptoms have been worsening. The asthma and wheezing has not. No palpitations. NO prior episodes like these.

## 2019-04-02 NOTE — PHYSICAL EXAM
[General Appearance - Well Developed] : well developed [Normal Appearance] : normal appearance [Well Groomed] : well groomed [General Appearance - Well Nourished] : well nourished [No Deformities] : no deformities [General Appearance - In No Acute Distress] : no acute distress [Normal Conjunctiva] : the conjunctiva exhibited no abnormalities [Eyelids - No Xanthelasma] : the eyelids demonstrated no xanthelasmas [Normal Oral Mucosa] : normal oral mucosa [No Oral Pallor] : no oral pallor [No Oral Cyanosis] : no oral cyanosis [Normal Jugular Venous A Waves Present] : normal jugular venous A waves present [Normal Jugular Venous V Waves Present] : normal jugular venous V waves present [No Jugular Venous Reyes A Waves] : no jugular venous reyes A waves [Respiration, Rhythm And Depth] : normal respiratory rhythm and effort [Exaggerated Use Of Accessory Muscles For Inspiration] : no accessory muscle use [Auscultation Breath Sounds / Voice Sounds] : lungs were clear to auscultation bilaterally [Heart Rate And Rhythm] : heart rate and rhythm were normal [Heart Sounds] : normal S1 and S2 [Abdomen Soft] : soft [Abdomen Tenderness] : non-tender [Abdomen Mass (___ Cm)] : no abdominal mass palpated [Abnormal Walk] : normal gait [Nail Clubbing] : no clubbing of the fingernails [Cyanosis, Localized] : no localized cyanosis [Petechial Hemorrhages (___cm)] : no petechial hemorrhages [FreeTextEntry1] : Varicose veins in Lower extremity [Skin Color & Pigmentation] : normal skin color and pigmentation [] : no rash [No Venous Stasis] : no venous stasis [Skin Lesions] : no skin lesions [No Skin Ulcers] : no skin ulcer [No Xanthoma] : no  xanthoma was observed [Oriented To Time, Place, And Person] : oriented to person, place, and time [Affect] : the affect was normal [Mood] : the mood was normal [No Anxiety] : not feeling anxious

## 2019-04-02 NOTE — CARDIOLOGY SUMMARY
[___] : [unfilled] [LVEF ___%] : LVEF [unfilled]% [None] : no pulmonary hypertension [Normal] : normal LA size [Mild] : mild mitral regurgitation [de-identified] : Lipid profile: Total chol 181l; HDl - 62; LDL - 90

## 2019-04-02 NOTE — REASON FOR VISIT
[Initial Evaluation] : an initial evaluation of [Abnormal ECG] : an abnormal ECG [FreeTextEntry2] : ischemic ECG; shortness of breathe, [FreeTextEntry1] : ischemic ECG; shortness of breathe,

## 2019-04-06 ENCOUNTER — TRANSCRIPTION ENCOUNTER (OUTPATIENT)
Age: 79
End: 2019-04-06

## 2019-04-22 ENCOUNTER — APPOINTMENT (OUTPATIENT)
Dept: CARDIOLOGY | Facility: CLINIC | Age: 79
End: 2019-04-22
Payer: MEDICARE

## 2019-04-22 PROCEDURE — 0399T: CPT

## 2019-04-22 PROCEDURE — 76376 3D RENDER W/INTRP POSTPROCES: CPT

## 2019-04-22 PROCEDURE — 93306 TTE W/DOPPLER COMPLETE: CPT

## 2019-05-01 ENCOUNTER — APPOINTMENT (OUTPATIENT)
Dept: CARDIOLOGY | Facility: CLINIC | Age: 79
End: 2019-05-01
Payer: MEDICARE

## 2019-05-01 PROCEDURE — 93351 STRESS TTE COMPLETE: CPT

## 2019-05-01 PROCEDURE — 93320 DOPPLER ECHO COMPLETE: CPT

## 2019-05-01 PROCEDURE — 93352 ADMIN ECG CONTRAST AGENT: CPT

## 2019-05-24 ENCOUNTER — OUTPATIENT (OUTPATIENT)
Dept: OUTPATIENT SERVICES | Facility: HOSPITAL | Age: 79
LOS: 1 days | End: 2019-05-24
Payer: MEDICARE

## 2019-05-24 VITALS
WEIGHT: 166.89 LBS | OXYGEN SATURATION: 94 % | TEMPERATURE: 98 F | HEART RATE: 85 BPM | SYSTOLIC BLOOD PRESSURE: 132 MMHG | DIASTOLIC BLOOD PRESSURE: 61 MMHG | RESPIRATION RATE: 16 BRPM

## 2019-05-24 VITALS
TEMPERATURE: 98 F | HEIGHT: 64 IN | WEIGHT: 166.89 LBS | HEART RATE: 85 BPM | SYSTOLIC BLOOD PRESSURE: 93 MMHG | RESPIRATION RATE: 20 BRPM | OXYGEN SATURATION: 93 %

## 2019-05-24 DIAGNOSIS — Z98.89 OTHER SPECIFIED POSTPROCEDURAL STATES: Chronic | ICD-10-CM

## 2019-05-24 DIAGNOSIS — Z90.49 ACQUIRED ABSENCE OF OTHER SPECIFIED PARTS OF DIGESTIVE TRACT: Chronic | ICD-10-CM

## 2019-05-24 DIAGNOSIS — R94.31 ABNORMAL ELECTROCARDIOGRAM [ECG] [EKG]: ICD-10-CM

## 2019-05-24 DIAGNOSIS — Z98.890 OTHER SPECIFIED POSTPROCEDURAL STATES: Chronic | ICD-10-CM

## 2019-05-24 DIAGNOSIS — Z01.810 ENCOUNTER FOR PREPROCEDURAL CARDIOVASCULAR EXAMINATION: ICD-10-CM

## 2019-05-24 LAB
ALBUMIN SERPL ELPH-MCNC: 3.9 G/DL — SIGNIFICANT CHANGE UP (ref 3.3–5.2)
ALP SERPL-CCNC: 73 U/L — SIGNIFICANT CHANGE UP (ref 40–120)
ALT FLD-CCNC: 16 U/L — SIGNIFICANT CHANGE UP
ANION GAP SERPL CALC-SCNC: 12 MMOL/L — SIGNIFICANT CHANGE UP (ref 5–17)
APTT BLD: 31 SEC — SIGNIFICANT CHANGE UP (ref 27.5–36.3)
AST SERPL-CCNC: 18 U/L — SIGNIFICANT CHANGE UP
BASOPHILS # BLD AUTO: 0 K/UL — SIGNIFICANT CHANGE UP (ref 0–0.2)
BASOPHILS NFR BLD AUTO: 0.6 % — SIGNIFICANT CHANGE UP (ref 0–2)
BILIRUB SERPL-MCNC: 0.4 MG/DL — SIGNIFICANT CHANGE UP (ref 0.4–2)
BUN SERPL-MCNC: 19 MG/DL — SIGNIFICANT CHANGE UP (ref 8–20)
CALCIUM SERPL-MCNC: 9.6 MG/DL — SIGNIFICANT CHANGE UP (ref 8.6–10.2)
CHLORIDE SERPL-SCNC: 107 MMOL/L — SIGNIFICANT CHANGE UP (ref 98–107)
CO2 SERPL-SCNC: 23 MMOL/L — SIGNIFICANT CHANGE UP (ref 22–29)
CREAT SERPL-MCNC: 1 MG/DL — SIGNIFICANT CHANGE UP (ref 0.5–1.3)
EOSINOPHIL # BLD AUTO: 0.1 K/UL — SIGNIFICANT CHANGE UP (ref 0–0.5)
EOSINOPHIL NFR BLD AUTO: 1.3 % — SIGNIFICANT CHANGE UP (ref 0–6)
GLUCOSE SERPL-MCNC: 156 MG/DL — HIGH (ref 70–115)
HCT VFR BLD CALC: 39.5 % — SIGNIFICANT CHANGE UP (ref 37–47)
HGB BLD-MCNC: 12.5 G/DL — SIGNIFICANT CHANGE UP (ref 12–16)
INR BLD: 1 RATIO — SIGNIFICANT CHANGE UP (ref 0.88–1.16)
LYMPHOCYTES # BLD AUTO: 1.1 K/UL — SIGNIFICANT CHANGE UP (ref 1–4.8)
LYMPHOCYTES # BLD AUTO: 24.3 % — SIGNIFICANT CHANGE UP (ref 20–55)
MCHC RBC-ENTMCNC: 29.6 PG — SIGNIFICANT CHANGE UP (ref 27–31)
MCHC RBC-ENTMCNC: 31.6 G/DL — LOW (ref 32–36)
MCV RBC AUTO: 93.6 FL — SIGNIFICANT CHANGE UP (ref 81–99)
MONOCYTES # BLD AUTO: 0.4 K/UL — SIGNIFICANT CHANGE UP (ref 0–0.8)
MONOCYTES NFR BLD AUTO: 7.7 % — SIGNIFICANT CHANGE UP (ref 3–10)
NEUTROPHILS # BLD AUTO: 3.1 K/UL — SIGNIFICANT CHANGE UP (ref 1.8–8)
NEUTROPHILS NFR BLD AUTO: 65.9 % — SIGNIFICANT CHANGE UP (ref 37–73)
PLATELET # BLD AUTO: 209 K/UL — SIGNIFICANT CHANGE UP (ref 150–400)
POTASSIUM SERPL-MCNC: 3.8 MMOL/L — SIGNIFICANT CHANGE UP (ref 3.5–5.3)
POTASSIUM SERPL-SCNC: 3.8 MMOL/L — SIGNIFICANT CHANGE UP (ref 3.5–5.3)
PROT SERPL-MCNC: 6.7 G/DL — SIGNIFICANT CHANGE UP (ref 6.6–8.7)
PROTHROM AB SERPL-ACNC: 11.5 SEC — SIGNIFICANT CHANGE UP (ref 10–12.9)
RBC # BLD: 4.22 M/UL — LOW (ref 4.4–5.2)
RBC # FLD: 13.5 % — SIGNIFICANT CHANGE UP (ref 11–15.6)
SODIUM SERPL-SCNC: 142 MMOL/L — SIGNIFICANT CHANGE UP (ref 135–145)
WBC # BLD: 4.6 K/UL — LOW (ref 4.8–10.8)
WBC # FLD AUTO: 4.6 K/UL — LOW (ref 4.8–10.8)

## 2019-05-24 PROCEDURE — 80053 COMPREHEN METABOLIC PANEL: CPT

## 2019-05-24 PROCEDURE — 85027 COMPLETE CBC AUTOMATED: CPT

## 2019-05-24 PROCEDURE — G0463: CPT

## 2019-05-24 PROCEDURE — 85730 THROMBOPLASTIN TIME PARTIAL: CPT

## 2019-05-24 PROCEDURE — 85610 PROTHROMBIN TIME: CPT

## 2019-05-24 PROCEDURE — 93010 ELECTROCARDIOGRAM REPORT: CPT

## 2019-05-24 PROCEDURE — 93005 ELECTROCARDIOGRAM TRACING: CPT

## 2019-05-24 PROCEDURE — 36415 COLL VENOUS BLD VENIPUNCTURE: CPT

## 2019-05-24 RX ORDER — ERGOCALCIFEROL 1.25 MG/1
1 CAPSULE ORAL
Qty: 0 | Refills: 0 | DISCHARGE

## 2019-05-24 NOTE — H&P PST ADULT - NSICDXPASTMEDICALHX_GEN_ALL_CORE_FT
PAST MEDICAL HISTORY:  Abnormal stress test 11-7-2017, cath done 11/21/17 was negitive    CAD (coronary artery disease)     Cholelithiasis newly diagnosed.    Emphysema     GERD (gastroesophageal reflux disease)     HLD (hyperlipidemia)     Incisional hernia     MR (mitral regurgitation) mild    Obese     Osteopenia after menopause PAST MEDICAL HISTORY:  Abnormal stress echo 5/2/2019 suggestive of CAD    Abnormal stress test 11-7-2017, cath done 11/21/17 was negitive    CAD (coronary artery disease)     Cholelithiasis newly diagnosed.    Emphysema     GERD (gastroesophageal reflux disease)     HLD (hyperlipidemia)     Incisional hernia     MR (mitral regurgitation) mild    Obese     Osteopenia after menopause

## 2019-05-24 NOTE — H&P PST ADULT - NEGATIVE NEUROLOGICAL SYMPTOMS
no focal seizures/no difficulty walking/no generalized seizures/no tremors/no loss of sensation/no weakness/no vertigo/no transient paralysis

## 2019-05-24 NOTE — H&P PST ADULT - NECK DETAILS
no JVD/supple/Tom sized lump to the Left side of neck pt said it is benign and being followed by her PCP

## 2019-05-24 NOTE — H&P PST ADULT - NSICDXPASTSURGICALHX_GEN_ALL_CORE_FT
PAST SURGICAL HISTORY:  H/O hernia repair 2012    S/P cholecystectomy August 15 2018    S/P small bowel resection 2003

## 2019-05-24 NOTE — H&P PST ADULT - ASSESSMENT
79 yo female with abnormal EKG and moderate risk NST.    Plan Left Heart Catheterization Wednesday 5/29/2019 1200  NPO after 0700  Patient instructed to take all medications in the am with a sip of water including a low dose aspirin. 77 yo female with abnormal EKG and moderate risk NST. GFR 54    Plan Left Heart Catheterization Wednesday 5/29/2019 1200  NPO after 0700  Pre hydrate with NS at 100cc/kg/min for 1 hour pre cath - 75cc/hr for one hour  Patient instructed to take all medications in the am with a sip of water including a low dose aspirin.

## 2019-05-29 ENCOUNTER — TRANSCRIPTION ENCOUNTER (OUTPATIENT)
Age: 79
End: 2019-05-29

## 2019-05-29 ENCOUNTER — OUTPATIENT (OUTPATIENT)
Dept: OUTPATIENT SERVICES | Facility: HOSPITAL | Age: 79
LOS: 1 days | End: 2019-05-29
Payer: MEDICARE

## 2019-05-29 VITALS
DIASTOLIC BLOOD PRESSURE: 65 MMHG | OXYGEN SATURATION: 98 % | RESPIRATION RATE: 16 BRPM | HEART RATE: 54 BPM | SYSTOLIC BLOOD PRESSURE: 151 MMHG

## 2019-05-29 VITALS
SYSTOLIC BLOOD PRESSURE: 138 MMHG | HEART RATE: 60 BPM | RESPIRATION RATE: 18 BRPM | DIASTOLIC BLOOD PRESSURE: 63 MMHG | OXYGEN SATURATION: 95 %

## 2019-05-29 DIAGNOSIS — Z90.49 ACQUIRED ABSENCE OF OTHER SPECIFIED PARTS OF DIGESTIVE TRACT: Chronic | ICD-10-CM

## 2019-05-29 DIAGNOSIS — R94.31 ABNORMAL ELECTROCARDIOGRAM [ECG] [EKG]: ICD-10-CM

## 2019-05-29 DIAGNOSIS — Z98.890 OTHER SPECIFIED POSTPROCEDURAL STATES: Chronic | ICD-10-CM

## 2019-05-29 DIAGNOSIS — Z98.89 OTHER SPECIFIED POSTPROCEDURAL STATES: Chronic | ICD-10-CM

## 2019-05-29 PROCEDURE — C1769: CPT

## 2019-05-29 PROCEDURE — C1894: CPT

## 2019-05-29 PROCEDURE — C1887: CPT

## 2019-05-29 PROCEDURE — 99152 MOD SED SAME PHYS/QHP 5/>YRS: CPT

## 2019-05-29 PROCEDURE — 99153 MOD SED SAME PHYS/QHP EA: CPT

## 2019-05-29 PROCEDURE — C1760: CPT

## 2019-05-29 PROCEDURE — 93454 CORONARY ARTERY ANGIO S&I: CPT | Mod: 26

## 2019-05-29 PROCEDURE — 93454 CORONARY ARTERY ANGIO S&I: CPT

## 2019-05-29 RX ORDER — METOPROLOL TARTRATE 50 MG
12.5 TABLET ORAL DAILY
Refills: 0 | Status: DISCONTINUED | OUTPATIENT
Start: 2019-05-29 | End: 2019-05-29

## 2019-05-29 RX ORDER — SODIUM CHLORIDE 9 MG/ML
400 INJECTION INTRAMUSCULAR; INTRAVENOUS; SUBCUTANEOUS
Refills: 0 | Status: DISCONTINUED | OUTPATIENT
Start: 2019-05-29 | End: 2019-06-13

## 2019-05-29 RX ORDER — SODIUM CHLORIDE 9 MG/ML
75 INJECTION INTRAMUSCULAR; INTRAVENOUS; SUBCUTANEOUS
Refills: 0 | Status: COMPLETED | OUTPATIENT
Start: 2019-05-29 | End: 2019-05-29

## 2019-05-29 RX ORDER — ALBUTEROL 90 UG/1
2 AEROSOL, METERED ORAL EVERY 6 HOURS
Refills: 0 | Status: DISCONTINUED | OUTPATIENT
Start: 2019-05-29 | End: 2019-06-13

## 2019-05-29 RX ORDER — ALBUTEROL 90 UG/1
2 AEROSOL, METERED ORAL
Qty: 1 | Refills: 2
Start: 2019-05-29 | End: 2019-08-26

## 2019-05-29 RX ORDER — ASPIRIN/CALCIUM CARB/MAGNESIUM 324 MG
81 TABLET ORAL ONCE
Refills: 0 | Status: COMPLETED | OUTPATIENT
Start: 2019-05-29 | End: 2019-05-29

## 2019-05-29 RX ADMIN — SODIUM CHLORIDE 75 MILLILITER(S): 9 INJECTION INTRAMUSCULAR; INTRAVENOUS; SUBCUTANEOUS at 15:30

## 2019-05-29 NOTE — DISCHARGE NOTE PROVIDER - NSDCFUADDAPPT_GEN_ALL_CORE_FT
GILBERT Taylor 1-2 weeks GILBERT Taylor 1-2 weeks  FU Dr Baxter call for appointment (Lung doctor)  Have bloodwork drawn week of Juanita 3 to monitor your kidney function

## 2019-05-29 NOTE — DISCHARGE NOTE PROVIDER - NSDCACTIVITY_GEN_ALL_CORE
Showering allowed/Do not drive or operate machinery/Do not make important decisions/Walking - Indoors allowed/No heavy lifting/straining

## 2019-05-29 NOTE — PROGRESS NOTE ADULT - REASON FOR ADMISSION
C to evaluate possible cardiac etiology for recent stress echocardiagram findings of RCA ischemia w/EF of 45-50%

## 2019-05-29 NOTE — DISCHARGE NOTE NURSING/CASE MANAGEMENT/SOCIAL WORK - NSDCFUADDAPPT_GEN_ALL_CORE_FT
GILBERT Taylor 1-2 weeks  FU Dr Baxter call for appointment (Lung doctor)  Have bloodwork drawn week of Juanita 3 to monitor your kidney function

## 2019-05-29 NOTE — DISCHARGE NOTE PROVIDER - CARE PROVIDERS DIRECT ADDRESSES
,[email or web address]

## 2019-05-29 NOTE — DISCHARGE NOTE PROVIDER - REASON FOR ADMISSION
C to evaluate abnormal stress echo revealing lower EF from prior of 45-50%/RCA ischemia with c/o SOB

## 2019-05-29 NOTE — PROGRESS NOTE ADULT - SUBJECTIVE AND OBJECTIVE BOX
NPO>4 hours  IVF 1ml/kg x1 hour initiated for GFR of 54 for diastolic dysfunction noted on echo  Aspirin 81 mg po ordered pre-cath  Metoprolol 12.5mg po   labs reviewed  History of Present Illness		  This is a 77 yo female with pmhx of COPD-emphysema, GERD, smoking, who was seen by her cardiologist for an abnormal EKG and MENCHACA.  A stress test from November 2017 had revealed moderate ischemia in the RCA territory.  An echocardiogram from October 2017 revealed grade I diastolic dysfunction, no pulmonary HTN, normal LA size, mild mitral regurgitation and an EF of 45-50%. She had a cardiac catheterization in November of 2017 which  revealed 10% occlusion of the LAD and 20% occlusion of the RCA. Subsequent follow up revealed further EKG changes and continued c/o MENCHACA.    A repeat Echocardiogram was done on 4/22/2019:  Normal LA size, mild global diffuse hypokinesis, LVEF 51%, Grade I diastolic dysfunction, normal RA size, normal RV size and function, No significant valvular abnormality    A Stress Echo was done 5/2/2019:  There were worsening of wall motion abnormalities consistent with moderate ischemia in the RCA territory.  Baseline segmental wall motion abnormalities as seen with CAD. EF 45-50%. A moderate risk study suggestive of CAD.    Holter Monitor April 2018:  Frequent PVCs     [facility] Nov 21 2017:     VENTRICLES: Global left ventricular function was normal. EF calculated by  contrast ventriculography was 60 %.  CORONARY VESSELS: The coronary circulation is right dominant.  LM:   --  LM: Normal.  LAD:   --  Mid LAD: There was a 10 % stenosis.  CX:   --  Circumflex: Normal.  RCA:   --  Proximal RCA: There was a 20 % stenosis.  COMPLICATIONS: There were no complications.  DIAGNOSTIC IMPRESSIONS: Mild CAD.  Normal LV function.  DIAGNOSTIC RECOMMENDATIONS: Assess for non-coronary causes of shortness of  breath.    REVIEW OF SYSTEMS:  Denies SOB, CP, NV, HA, dizziness, palpitations    PHYSICAL EXAM: A&Ox3 NAD Skin warm and dry  NEURO: Speech intact +gag +swallow Tongue midline ROMANO  NECK: No JVD, trachea midline. Eupneic  HEART: Sl irreg SR w/PVCs on tele noted  PULMONARY:  CTA guanakito  ABDOMEN: Soft nontender X4 +BS NPO>4 hours  IVF 1ml/kg x1 hour initiated for GFR of 54 for diastolic dysfunction noted on echo  Aspirin 81 mg po ordered pre-cath  Metoprolol 12.5mg po   labs reviewed  History of Present Illness		  This is a 79 yo female with pmhx of COPD-emphysema, GERD, smoking, who was seen by her cardiologist for an abnormal EKG and MENCHACA.  A stress test from November 2017 had revealed moderate ischemia in the RCA territory.  An echocardiogram from October 2017 revealed grade I diastolic dysfunction, no pulmonary HTN, normal LA size, mild mitral regurgitation and an EF of 45-50%. She had a cardiac catheterization in November of 2017 which  revealed 10% occlusion of the LAD and 20% occlusion of the RCA. Subsequent follow up revealed further EKG changes and continued c/o MENCHACA.    A repeat Echocardiogram was done on 4/22/2019:  Normal LA size, mild global diffuse hypokinesis, LVEF 51%, Grade I diastolic dysfunction, normal RA size, normal RV size and function, No significant valvular abnormality    A Stress Echo was done 5/2/2019:  There were worsening of wall motion abnormalities consistent with moderate ischemia in the RCA territory.  Baseline segmental wall motion abnormalities as seen with CAD. EF 45-50%. A moderate risk study suggestive of CAD.    Holter Monitor April 2018:  Frequent PVCs     [facility] Nov 21 2017:     VENTRICLES: Global left ventricular function was normal. EF calculated by  contrast ventriculography was 60 %.  CORONARY VESSELS: The coronary circulation is right dominant.  LM:   --  LM: Normal.  LAD:   --  Mid LAD: There was a 10 % stenosis.  CX:   --  Circumflex: Normal.  RCA:   --  Proximal RCA: There was a 20 % stenosis.  COMPLICATIONS: There were no complications.  DIAGNOSTIC IMPRESSIONS: Mild CAD.  Normal LV function.  DIAGNOSTIC RECOMMENDATIONS: Assess for non-coronary causes of shortness of  breath.    REVIEW OF SYSTEMS:  Denies SOB, CP, NV, HA, dizziness, palpitations    PHYSICAL EXAM: A&Ox3 NAD Skin warm and dry  NEURO: Speech intact +gag +swallow Tongue midline ROMANO  NECK: No JVD, trachea midline. Eupneic  HEART: Sl irreg SR w/PVCs on tele noted  PULMONARY:  CTA guan[place]ito no wheeze, rales or rhonci +SOB with adequate SaO2 of 99% on RA  ABDOMEN: Soft nontender X4 +BS

## 2019-05-29 NOTE — DISCHARGE NOTE PROVIDER - CARE PROVIDER_API CALL
Aleja Taylor)  Cardiology; Internal Medicine  [address] [place] [address], [address] [id number]  Phone: [phone]  Fax: [phone]  Follow Up Time: Aleja Taylor)  Cardiology; Internal Medicine  [address] [facility], [address]  [place], [place] [id number]  Phone: [phone]  Fax: [phone]  Follow Up Time:     Samuel Baxter)  Critical Care Medicine; Internal Medicine; Pulmonary Disease; Sleep Medicine  [address] [facility], [address] [place] [address]  Phone: [phone]  Fax: [phone]  Follow Up Time:

## 2019-05-29 NOTE — DISCHARGE NOTE PROVIDER - NSDCFUADDINST_GEN_ALL_CORE_FT
No heavy lifting, driving, sex, tub baths, swimming, or any activity that submerges the lower half of the body in water for 48 hours.  Limited walking and stairs for 48 hours.    Change the bandaid after 24 hours and every 24 hours after that.  Keep the puncture site dry and covered with a bandaid until a scab forms.    Observe the site frequently.  If bleeding or a large lump (the size of a golf ball or bigger) occurs lie flat, apply continuous direct pressure just above the puncture site for at least 10 minutes, and notify your physician immediately.  If the bleeding cannot be controlled, call 911 immediately for assistance.  Notify your physician of pain, swelling or any drainage.    Notify your physician immediately if coldness, numbness, discoloration or pain in your foot occurs.  REMOVE RIGHT GROIN DRESSING WITHIN 24 HOURS OF DISCHARGE

## 2019-05-29 NOTE — DISCHARGE NOTE NURSING/CASE MANAGEMENT/SOCIAL WORK - NSDCDPATPORTLINK_GEN_ALL_CORE
You can access the [facility] Patient Portal, offered by [facility], by registering with the following website: [email or web address]

## 2019-05-29 NOTE — DISCHARGE NOTE PROVIDER - NSDCCPCAREPLAN_GEN_ALL_CORE_FT
PRINCIPAL DISCHARGE DIAGNOSIS  Diagnosis: S/P cardiac catheterization  Assessment and Plan of Treatment:

## 2019-05-29 NOTE — DISCHARGE NOTE PROVIDER - PROVIDER TOKENS
PROVIDER:[TOKEN:[[id number]]] PROVIDER:[TOKEN:[[id number]]],PROVIDER:[TOKEN:[[id number]]]

## 2019-05-29 NOTE — DISCHARGE NOTE PROVIDER - HOSPITAL COURSE
s/p s/p LHC via RFA with MYNX after unsuccessful  RRA s/p LHC via RFA with MYNX after unsuccessful  RRA    Diagnostic No changes from 2017     Tolerated procedure well w/o complications    Seen post procedure by Dr. Huber with daughter at bedside                    REVIEW OF SYSTEMS:    Denies SOB, CP, NV, HA, dizziness, palpitations, site pain        PHYSICAL EXAM: A&Ox3 NAD Skin warm and dry    NEURO: Speech intact +gag +swallow Tongue midline ROMANO    NECK: No JVD, trachea midline. Eupneic    HEART: SR no ectopy rare PVC    PULMONARY:  CTA guanakito    ABDOMEN: Soft nontender X4 +BS     EXTREMITIES: Rt Radial site: Rt radial pulse + w/pulse ox on right index finger SaO2>95% RUE w/oneurovascular deficit. Capillary refill <3 sec    RFA site: No bleed, hematoma, pain, ecchymosis or swelling Rt DP/PT+        A- Normal coronaries        P- Check BMP one week to monitor renal function    Rt groin care with instructions to pt    Albuterol inhaler prn for emergency / rescue use with teaching    To see pulmonary outpt s/p LHC via RFA with MYNX after unsuccessful  RRA    Diagnostic No changes from 2017     Tolerated procedure well w/o complications    Seen post procedure by Dr. Huber with daughter at bedside                    REVIEW OF SYSTEMS:    Denies SOB, CP, NV, HA, dizziness, palpitations, site pain  OOB ambulating VSS        PHYSICAL EXAM: A&Ox3 NAD Skin warm and dry    NEURO: Speech intact +gag +swallow Tongue midline ROMANO    NECK: No JVD, trachea midline. Eupneic    HEART: SR no ectopy rare PVC    PULMONARY:  CTA guanakito    ABDOMEN: Soft nontender X4 +BS     EXTREMITIES: Rt Radial site: Rt radial pulse + w/pulse ox on right index finger SaO2>95% RUE w/oneurovascular deficit. Capillary refill <3 sec    RFA site: No bleed, hematoma, pain, ecchymosis or swelling Rt DP/PT+        A- Normal coronaries        P- Check BMP one week to monitor renal function    Rt groin care with instructions to pt    Albuterol inhaler prn for emergency / rescue use with teaching    To see pulmonary outpt

## 2019-06-11 PROBLEM — R94.39 ABNORMAL RESULT OF OTHER CARDIOVASCULAR FUNCTION STUDY: Chronic | Status: ACTIVE | Noted: 2019-05-24

## 2019-06-18 ENCOUNTER — NON-APPOINTMENT (OUTPATIENT)
Age: 79
End: 2019-06-18

## 2019-06-18 ENCOUNTER — APPOINTMENT (OUTPATIENT)
Dept: CARDIOLOGY | Facility: CLINIC | Age: 79
End: 2019-06-18
Payer: MEDICARE

## 2019-06-18 VITALS
OXYGEN SATURATION: 94 % | DIASTOLIC BLOOD PRESSURE: 71 MMHG | BODY MASS INDEX: 29.71 KG/M2 | HEIGHT: 64 IN | RESPIRATION RATE: 17 BRPM | SYSTOLIC BLOOD PRESSURE: 120 MMHG | WEIGHT: 174 LBS | HEART RATE: 78 BPM

## 2019-06-18 DIAGNOSIS — R33.9 RETENTION OF URINE, UNSPECIFIED: ICD-10-CM

## 2019-06-18 DIAGNOSIS — K59.00 CONSTIPATION, UNSPECIFIED: ICD-10-CM

## 2019-06-18 PROCEDURE — 99215 OFFICE O/P EST HI 40 MIN: CPT

## 2019-06-18 PROCEDURE — 93000 ELECTROCARDIOGRAM COMPLETE: CPT

## 2019-06-18 RX ORDER — METOPROLOL SUCCINATE 25 MG/1
25 TABLET, EXTENDED RELEASE ORAL DAILY
Qty: 90 | Refills: 3 | Status: DISCONTINUED | COMMUNITY
Start: 2019-05-02 | End: 2019-06-18

## 2019-06-18 RX ORDER — CHROMIUM 200 MCG
1000 TABLET ORAL DAILY
Refills: 0 | Status: DISCONTINUED | COMMUNITY
Start: 2019-04-02 | End: 2019-06-18

## 2019-06-18 NOTE — HISTORY OF PRESENT ILLNESS
[FreeTextEntry1] : F/u for abnormal EKG and MENCHACA\par \par patient had a got stress test done. it was abnormnalk. got cardiac cath. no epicardial disease. \par no chest pain. + dyspnea. has emphysema. NO headahces. No dizziness.\par she gets cramps in the legs all the time. worse when she walks. \par \par old note: tolerated surgery well. it was gall bladder surgery. no chest pain. no dyspnea. no  palopitations. No Swelling of feet. \par dyspnea on exertion on walking for 1 block. She has emphyseam. . no chest pain. no palpitations. \par \par \par old note: reason for f/u is for Pre-operative cardiovascular risk evaluation and management for gall bladder surgery\par jhas gall stones. \par \par no chest jia. no dyspnea./ no dizzines. \par \par \par old note: no new symptoms. persistent dyspnea . No chest pain. No syncope. +  palpitations. intermittent. few months. 2-3 episodes in a week. and when she walks upstairs. \par No syncope. \par \par OLD Note:This is a 74 F with history of smoking, COPD/Emphysema, with abnormal ECG. \par Patient states she feel shortness of breathe after climbing stairs.  But she thinks it her COPD. No chest tightness. The Symptoms resolve after resting. for the last few days the symptoms have been worsening. The asthma and wheezing has not. No palpitations. NO prior episodes like these.

## 2019-06-18 NOTE — CARDIOLOGY SUMMARY
[___] : [unfilled] [LVEF ___%] : LVEF [unfilled]% [Normal] : normal LA size [None] : no mitral regurgitation [de-identified] : Lipid profile: Total chol 181l; HDl - 62; LDL - 90

## 2019-06-18 NOTE — DISCUSSION/SUMMARY
[Patient] : the patient [Risks] : risks [Benefits] : benefits [Alternatives] : alternatives [___ Month(s)] : [unfilled] month(s) [With Me] : with me [FreeTextEntry1] : 74 F with prior smoking, COPD/with abnormal EKG and dyspnea on exertion\par 1) PVCs: 72 hr HOLTEr. \par 2) Abnormal EKG: dyspnea one xertion: emphyseamea. stress echo with definity perfusion [place] scan : moderate ischemia in RCa. she had similar defects in 2017. cath > unremarkable  .  microvascualr ischemia. \par microvascualr ischemia or endothelial dysfunction. aspirin 81 mg . \par 3) Diastolic heart failure: EF  45-50%\par 4)CAD preventive visit:  last stress test 2017. mild coronary artery disease aspirina and statins \par 5)  carotid plaque: Mild atherosclerosis. ct statsin .\par 6:  Moderate MR: r will repeat echo  repeat echo. : no significant valvular abnormality .\par 7: cramps and claudication in legs: PEMA with PVR and provocation. \par

## 2019-06-27 ENCOUNTER — APPOINTMENT (OUTPATIENT)
Dept: PULMONOLOGY | Facility: CLINIC | Age: 79
End: 2019-06-27
Payer: MEDICARE

## 2019-06-27 VITALS
DIASTOLIC BLOOD PRESSURE: 68 MMHG | OXYGEN SATURATION: 93 % | HEART RATE: 79 BPM | SYSTOLIC BLOOD PRESSURE: 112 MMHG | RESPIRATION RATE: 16 BRPM

## 2019-06-27 VITALS — BODY MASS INDEX: 30.27 KG/M2 | WEIGHT: 173 LBS | HEIGHT: 63.5 IN

## 2019-06-27 PROCEDURE — 99204 OFFICE O/P NEW MOD 45 MIN: CPT | Mod: 25

## 2019-06-27 PROCEDURE — 85018 HEMOGLOBIN: CPT | Mod: QW

## 2019-06-27 PROCEDURE — 94664 DEMO&/EVAL PT USE INHALER: CPT | Mod: 59

## 2019-06-27 PROCEDURE — 94729 DIFFUSING CAPACITY: CPT

## 2019-06-27 PROCEDURE — 94727 GAS DIL/WSHOT DETER LNG VOL: CPT

## 2019-06-27 PROCEDURE — 94060 EVALUATION OF WHEEZING: CPT

## 2019-06-27 RX ORDER — ATORVASTATIN CALCIUM 10 MG/1
10 TABLET, FILM COATED ORAL
Qty: 30 | Refills: 0 | Status: DISCONTINUED | COMMUNITY
Start: 2018-11-06 | End: 2019-06-27

## 2019-06-27 NOTE — CONSULT LETTER
[Dear  ___] : Dear  [unfilled], [Consult Letter:] : I had the pleasure of evaluating your patient, [unfilled]. [Please see my note below.] : Please see my note below. [Consult Closing:] : Thank you very much for allowing me to participate in the care of this patient.  If you have any questions, please do not hesitate to contact me. [Sincerely,] : Sincerely, [DrFlavia  ___] : Dr. MCKEON

## 2019-07-01 ENCOUNTER — APPOINTMENT (OUTPATIENT)
Dept: CARDIOLOGY | Facility: CLINIC | Age: 79
End: 2019-07-01
Payer: MEDICARE

## 2019-07-01 PROCEDURE — 93923 UPR/LXTR ART STDY 3+ LVLS: CPT

## 2019-07-03 NOTE — PHYSICAL EXAM
[Normal Conjunctiva] : the conjunctiva exhibited no abnormalities [Normal Oropharynx] : normal oropharynx [Neck Appearance] : the appearance of the neck was normal [Jugular Venous Distention Increased] : there was no jugular-venous distention [Thyroid Diffuse Enlargement] : the thyroid was not enlarged [Heart Sounds] : normal S1 and S2 [Edema] : no peripheral edema present [Arterial Pulses Normal] : the arterial pulses were normal [Respiration, Rhythm And Depth] : normal respiratory rhythm and effort [Auscultation Breath Sounds / Voice Sounds] : lungs were clear to auscultation bilaterally [Lungs Percussion] : the lungs were normal to percussion [Bowel Sounds] : normal bowel sounds [Abdomen Soft] : soft [Abdomen Tenderness] : non-tender [Abnormal Walk] : normal gait [Nail Clubbing] : no clubbing of the fingernails [Cyanosis, Localized] : no localized cyanosis [Skin Turgor] : normal skin turgor [] : no rash [No Focal Deficits] : no focal deficits [FreeTextEntry1] : Overweight

## 2019-07-03 NOTE — DISCUSSION/SUMMARY
[FreeTextEntry1] : Moderate COPD\par Using LABA/ICS\par Will add LAMA\par Encouraged exercise\par Doesn't want rehab (also daughter is using her car for work)

## 2019-07-03 NOTE — HISTORY OF PRESENT ILLNESS
[FreeTextEntry1] : 78 year old female former > 100 pack year smoker (DCj in 2009) seen for dyspnea and emphysema\par Not interested in rehab\par No recent imaging

## 2019-07-26 ENCOUNTER — TRANSCRIPTION ENCOUNTER (OUTPATIENT)
Age: 79
End: 2019-07-26

## 2019-08-30 ENCOUNTER — APPOINTMENT (OUTPATIENT)
Dept: FAMILY MEDICINE | Facility: CLINIC | Age: 79
End: 2019-08-30

## 2019-11-05 ENCOUNTER — APPOINTMENT (OUTPATIENT)
Dept: CARDIOLOGY | Facility: CLINIC | Age: 79
End: 2019-11-05
Payer: MEDICARE

## 2019-11-05 ENCOUNTER — NON-APPOINTMENT (OUTPATIENT)
Age: 79
End: 2019-11-05

## 2019-11-05 VITALS
HEIGHT: 63.5 IN | OXYGEN SATURATION: 94 % | WEIGHT: 178 LBS | HEART RATE: 78 BPM | SYSTOLIC BLOOD PRESSURE: 129 MMHG | DIASTOLIC BLOOD PRESSURE: 63 MMHG | BODY MASS INDEX: 31.15 KG/M2

## 2019-11-05 PROCEDURE — 99215 OFFICE O/P EST HI 40 MIN: CPT

## 2019-11-05 PROCEDURE — 93000 ELECTROCARDIOGRAM COMPLETE: CPT

## 2019-11-05 RX ORDER — CARVEDILOL 3.12 MG/1
3.12 TABLET, FILM COATED ORAL
Refills: 0 | Status: DISCONTINUED | COMMUNITY
Start: 2019-06-18 | End: 2019-11-05

## 2019-11-05 NOTE — HISTORY OF PRESENT ILLNESS
[FreeTextEntry1] : F/u for abnormal EKG and MENCHACA\par HPI for today: : feels good. takes her meds regurl;ramon. \par Nothing bothers. Dyspnea one xertion chronic.  No change in shortness of breathe . COPD.\par she was started on toprol last visit. \par \par \par old note: patient had a got stress test done. it was abnormnalk. got cardiac cath. no epicardial disease. \par no chest pain. + dyspnea. has emphysema. NO headahces. No dizziness.\par she gets cramps in the legs all the time. worse when she walks. \par \par old note: tolerated surgery well. it was gall bladder surgery. no chest pain. no dyspnea. no  palopitations. No Swelling of feet. \par dyspnea on exertion on walking for 1 block. She has emphyseam. . no chest pain. no palpitations. \par

## 2019-11-05 NOTE — DISCUSSION/SUMMARY
[Patient] : the patient [Risks] : risks [Benefits] : benefits [Alternatives] : alternatives [___ Month(s)] : [unfilled] month(s) [With Me] : with me [FreeTextEntry1] : 74 F with prior smoking, COPD/with abnormal EKG and dyspnea on exertion\par 1) PVCs: 72 hr HOLTEr Pvcs 11% [place]. . - NSVT : on toprol. \par 2) Abnormal EKG:  microvascualr disease. microvascular ischemia/angina Abnormal Stress test with normal cath. \par microvascualr ischemia or endothelial dysfunction. aspirin 81 mg . beta blocker \par 3) Diastolic heart failure: EF  45-50%\par 4)CAD  aspirina and statins \par 5)  carotid plaque: Mild atherosclerosis. ct statsin .\par 6:  Moderate MR: resoled. \par 7: cramps and claudication in legs: PEMA with PVR and provocation. : Abnormal PEMA. medical conservative management. repeat in 1 year. \par

## 2019-11-05 NOTE — PHYSICAL EXAM
[General Appearance - Well Developed] : well developed [Normal Appearance] : normal appearance [Well Groomed] : well groomed [General Appearance - Well Nourished] : well nourished [No Deformities] : no deformities [General Appearance - In No Acute Distress] : no acute distress [Normal Conjunctiva] : the conjunctiva exhibited no abnormalities [Eyelids - No Xanthelasma] : the eyelids demonstrated no xanthelasmas [Normal Oral Mucosa] : normal oral mucosa [No Oral Pallor] : no oral pallor [No Oral Cyanosis] : no oral cyanosis [Normal Jugular Venous A Waves Present] : normal jugular venous A waves present [Normal Jugular Venous V Waves Present] : normal jugular venous V waves present [No Jugular Venous Reyes A Waves] : no jugular venous reyes A waves [Respiration, Rhythm And Depth] : normal respiratory rhythm and effort [Exaggerated Use Of Accessory Muscles For Inspiration] : no accessory muscle use [Auscultation Breath Sounds / Voice Sounds] : lungs were clear to auscultation bilaterally [Heart Rate And Rhythm] : heart rate and rhythm were normal [Heart Sounds] : normal S1 and S2 [Abdomen Soft] : soft [Abdomen Tenderness] : non-tender [Abdomen Mass (___ Cm)] : no abdominal mass palpated [Abnormal Walk] : normal gait [Nail Clubbing] : no clubbing of the fingernails [Cyanosis, Localized] : no localized cyanosis [Petechial Hemorrhages (___cm)] : no petechial hemorrhages [Skin Color & Pigmentation] : normal skin color and pigmentation [] : no rash [No Venous Stasis] : no venous stasis [Skin Lesions] : no skin lesions [No Skin Ulcers] : no skin ulcer [No Xanthoma] : no  xanthoma was observed [Oriented To Time, Place, And Person] : oriented to person, place, and time [Affect] : the affect was normal [Mood] : the mood was normal [No Anxiety] : not feeling anxious [FreeTextEntry1] : Varicose veins in Lower extremity

## 2019-11-05 NOTE — REVIEW OF SYSTEMS
[see HPI] : see HPI [Shortness Of Breath] : shortness of breath [Dyspnea on exertion] : dyspnea during exertion [Negative] : Heme/Lymph [Chest  Pressure] : no chest pressure [Chest Pain] : no chest pain [Lower Ext Edema] : no extremity edema [Leg Claudication] : no intermittent leg claudication [Palpitations] : no palpitations [FreeTextEntry2] : swelling of feet and leg cramps.

## 2019-11-05 NOTE — CARDIOLOGY SUMMARY
[___] : [unfilled] [LVEF ___%] : LVEF [unfilled]% [Normal] : normal LA size [None] : no mitral regurgitation [___] : [unfilled] [de-identified] : apr 2019: 11% PVCs.  NSVT. 5 beats.  [de-identified] : Lipid profile: Total chol 181l; HDl - 62; LDL - 90

## 2020-01-02 ENCOUNTER — APPOINTMENT (OUTPATIENT)
Dept: PULMONOLOGY | Facility: CLINIC | Age: 80
End: 2020-01-02

## 2020-07-07 ENCOUNTER — APPOINTMENT (OUTPATIENT)
Dept: CARDIOLOGY | Facility: CLINIC | Age: 80
End: 2020-07-07
Payer: MEDICARE

## 2020-07-07 VITALS
DIASTOLIC BLOOD PRESSURE: 72 MMHG | TEMPERATURE: 98.6 F | HEART RATE: 66 BPM | BODY MASS INDEX: 31.76 KG/M2 | OXYGEN SATURATION: 96 % | HEIGHT: 64 IN | WEIGHT: 186 LBS | SYSTOLIC BLOOD PRESSURE: 128 MMHG

## 2020-07-07 DIAGNOSIS — R94.39 ABNORMAL RESULT OF OTHER CARDIOVASCULAR FUNCTION STUDY: ICD-10-CM

## 2020-07-07 DIAGNOSIS — I20.8 OTHER FORMS OF ANGINA PECTORIS: ICD-10-CM

## 2020-07-07 PROCEDURE — 99215 OFFICE O/P EST HI 40 MIN: CPT

## 2020-07-07 PROCEDURE — 93000 ELECTROCARDIOGRAM COMPLETE: CPT | Mod: 59

## 2020-07-07 RX ORDER — OMEPRAZOLE 20 MG/1
20 CAPSULE, DELAYED RELEASE ORAL DAILY
Refills: 0 | Status: ACTIVE | COMMUNITY

## 2020-07-07 RX ORDER — TAMSULOSIN HYDROCHLORIDE 0.4 MG/1
0.4 CAPSULE ORAL
Qty: 90 | Refills: 3 | Status: DISCONTINUED | COMMUNITY
Start: 2019-06-18 | End: 2020-07-07

## 2020-07-07 RX ORDER — LEVOTHYROXINE SODIUM 0.12 MG/1
125 TABLET ORAL
Refills: 0 | Status: DISCONTINUED | COMMUNITY
Start: 2019-06-18 | End: 2020-07-07

## 2020-07-07 RX ORDER — DOCUSATE SODIUM 100 MG/1
100 CAPSULE, LIQUID FILLED ORAL TWICE DAILY
Refills: 0 | Status: DISCONTINUED | COMMUNITY
Start: 2019-06-18 | End: 2020-07-07

## 2020-07-07 NOTE — HISTORY OF PRESENT ILLNESS
[FreeTextEntry1] : F/u for abnormal EKG and MENCHACA\par \par \par HPI for today : feels good. taking toprol xl. no headahces. no dizzines.s no paliopationts. no syncoope\par + dyspnea on exertion  . oas emphysema. \par \par old note:  feels good. takes her meds regurl;ramon. \par Nothing bothers. Dyspnea one xertion chronic.  No change in shortness of breathe . COPD.\par she was started on toprol last visit. \par \par \par old note: patient had a got stress test done. it was abnormnalk. got cardiac cath. no epicardial disease. \par no chest pain. + dyspnea. has emphysema. NO headahces. No dizziness.\par she gets cramps in the legs all the time. worse when she walks. \par \par old note: tolerated surgery well. it was gall bladder surgery. no chest pain. no dyspnea. no  palopitations. No Swelling of feet. \par dyspnea on exertion on walking for 1 block. She has emphyseam. . no chest pain. no palpitations. \par

## 2020-07-07 NOTE — PHYSICAL EXAM
[Normal Appearance] : normal appearance [General Appearance - Well Developed] : well developed [No Deformities] : no deformities [General Appearance - Well Nourished] : well nourished [Well Groomed] : well groomed [General Appearance - In No Acute Distress] : no acute distress [Normal Conjunctiva] : the conjunctiva exhibited no abnormalities [Eyelids - No Xanthelasma] : the eyelids demonstrated no xanthelasmas [No Oral Pallor] : no oral pallor [Normal Oral Mucosa] : normal oral mucosa [No Oral Cyanosis] : no oral cyanosis [Normal Jugular Venous A Waves Present] : normal jugular venous A waves present [Normal Jugular Venous V Waves Present] : normal jugular venous V waves present [No Jugular Venous Reyes A Waves] : no jugular venous reyes A waves [Respiration, Rhythm And Depth] : normal respiratory rhythm and effort [Exaggerated Use Of Accessory Muscles For Inspiration] : no accessory muscle use [Auscultation Breath Sounds / Voice Sounds] : lungs were clear to auscultation bilaterally [Heart Sounds] : normal S1 and S2 [Heart Rate And Rhythm] : heart rate and rhythm were normal [Abdomen Soft] : soft [Abdomen Tenderness] : non-tender [Abnormal Walk] : normal gait [Abdomen Mass (___ Cm)] : no abdominal mass palpated [Nail Clubbing] : no clubbing of the fingernails [Cyanosis, Localized] : no localized cyanosis [Petechial Hemorrhages (___cm)] : no petechial hemorrhages [FreeTextEntry1] : Varicose veins in Lower extremity [Skin Color & Pigmentation] : normal skin color and pigmentation [Skin Lesions] : no skin lesions [] : no rash [No Venous Stasis] : no venous stasis [No Xanthoma] : no  xanthoma was observed [No Skin Ulcers] : no skin ulcer [Oriented To Time, Place, And Person] : oriented to person, place, and time [Affect] : the affect was normal [Mood] : the mood was normal [No Anxiety] : not feeling anxious

## 2020-07-07 NOTE — REVIEW OF SYSTEMS
[Dyspnea on exertion] : dyspnea during exertion [Shortness Of Breath] : shortness of breath [see HPI] : see HPI [Chest  Pressure] : no chest pressure [Lower Ext Edema] : no extremity edema [Chest Pain] : no chest pain [Leg Claudication] : no intermittent leg claudication [Palpitations] : no palpitations [Negative] : Endocrine [FreeTextEntry2] : swelling of feet and leg cramps.

## 2020-07-07 NOTE — CARDIOLOGY SUMMARY
[LVEF ___%] : LVEF [unfilled]% [___] : [unfilled] [Normal] : normal LA size [None] : no mitral regurgitation [de-identified] : apr 2019: 11% PVCs.  NSVT. 5 beats.  [de-identified] : Lipid profile: Total chol 181l; HDl - 62; LDL - 90

## 2020-07-07 NOTE — DISCUSSION/SUMMARY
[Patient] : the patient [Benefits] : benefits [Risks] : risks [Alternatives] : alternatives [___ Month(s)] : [unfilled] month(s) [With Me] : with me [FreeTextEntry1] : 74 F with prior smoking, COPD/with abnormal EKG and dyspnea on exertion\par 1) PVCs:   on toprol.  repeat holter 48 hrs . \par 2) Abnormal EKG:  microvascualr disease. microvascular ischemia/angina Abnormal Stress test with normal cath. \par microvascualr ischemia or endothelial dysfunction. aspirin 81 mg . beta blocker \par 3) Diastolic heart failure: EF  45-50% repeat echo with 3D and strain .  Not on ARB. if repeat echo poor, then emily start on ARB. also will evaluate for PVC burden. \par 4)CAD  aspirina and statins \par 5)  carotid plaque: Mild atherosclerosis. ct statsin . repeat Carotud US. last carotid 2017\par 6:  Moderate MR: resolved. repeat echo. \par 7: cramps and claudication in legs: PEMA with PVR and provocation. : Abnormal PEMA. medical conservative management.  \par

## 2020-07-31 ENCOUNTER — APPOINTMENT (OUTPATIENT)
Dept: CARDIOLOGY | Facility: CLINIC | Age: 80
End: 2020-07-31
Payer: MEDICARE

## 2020-07-31 PROCEDURE — 93880 EXTRACRANIAL BILAT STUDY: CPT

## 2020-07-31 PROCEDURE — 93356 MYOCRD STRAIN IMG SPCKL TRCK: CPT

## 2020-07-31 PROCEDURE — 93306 TTE W/DOPPLER COMPLETE: CPT

## 2020-08-05 ENCOUNTER — TRANSCRIPTION ENCOUNTER (OUTPATIENT)
Age: 80
End: 2020-08-05

## 2020-08-10 ENCOUNTER — TRANSCRIPTION ENCOUNTER (OUTPATIENT)
Age: 80
End: 2020-08-10

## 2020-08-14 ENCOUNTER — TRANSCRIPTION ENCOUNTER (OUTPATIENT)
Age: 80
End: 2020-08-14

## 2021-01-01 NOTE — H&P PST ADULT - PROBLEM SELECTOR PLAN 1
(2) more than 100 beats/min laparoscopic cholecystectomy possible open  instructions given, labs drawn

## 2021-03-11 NOTE — H&P PST ADULT - HISTORY OF PRESENT ILLNESS
Asthma    No pertinent past medical history     77 year old female with c/o of severe epigastric pain associated with nausea, lasted for over a week, then went to the ER around 7/12/18. CT and ultrasound done and was found to have cholelithiasis. Now presents for laparoscopic cholecystectomy. Pain resolved since 7/18/18.

## 2021-04-13 ENCOUNTER — APPOINTMENT (OUTPATIENT)
Dept: CARDIOLOGY | Facility: CLINIC | Age: 81
End: 2021-04-13
Payer: MEDICARE

## 2021-04-13 ENCOUNTER — NON-APPOINTMENT (OUTPATIENT)
Age: 81
End: 2021-04-13

## 2021-04-13 VITALS
BODY MASS INDEX: 31.41 KG/M2 | OXYGEN SATURATION: 95 % | WEIGHT: 184 LBS | HEART RATE: 74 BPM | TEMPERATURE: 98.3 F | SYSTOLIC BLOOD PRESSURE: 131 MMHG | HEIGHT: 64 IN | DIASTOLIC BLOOD PRESSURE: 63 MMHG

## 2021-04-13 DIAGNOSIS — Z13.6 ENCOUNTER FOR SCREENING FOR CARDIOVASCULAR DISORDERS: ICD-10-CM

## 2021-04-13 DIAGNOSIS — I73.9 PERIPHERAL VASCULAR DISEASE, UNSPECIFIED: ICD-10-CM

## 2021-04-13 DIAGNOSIS — R00.2 PALPITATIONS: ICD-10-CM

## 2021-04-13 DIAGNOSIS — Z87.891 PERSONAL HISTORY OF NICOTINE DEPENDENCE: ICD-10-CM

## 2021-04-13 DIAGNOSIS — E55.9 VITAMIN D DEFICIENCY, UNSPECIFIED: ICD-10-CM

## 2021-04-13 DIAGNOSIS — I65.23 OCCLUSION AND STENOSIS OF BILATERAL CAROTID ARTERIES: ICD-10-CM

## 2021-04-13 PROCEDURE — 99072 ADDL SUPL MATRL&STAF TM PHE: CPT

## 2021-04-13 PROCEDURE — 93000 ELECTROCARDIOGRAM COMPLETE: CPT

## 2021-04-13 PROCEDURE — 99215 OFFICE O/P EST HI 40 MIN: CPT

## 2021-04-13 RX ORDER — METOPROLOL SUCCINATE 25 MG/1
25 TABLET, EXTENDED RELEASE ORAL DAILY
Qty: 90 | Refills: 2 | Status: ACTIVE | COMMUNITY
Start: 2019-11-05 | End: 1900-01-01

## 2021-04-16 NOTE — DISCUSSION/SUMMARY
[Patient] : the patient [Risks] : risks [Benefits] : benefits [Alternatives] : alternatives [___ Month(s)] : [unfilled] month(s) [With Me] : with me [FreeTextEntry1] : 74 F with prior smoking, COPD/with abnormal EKG and dyspnea on exertion\par 1) PVCs:   on toprol \par 2) Abnormal EKG:  microvascualr disease. microvascular ischemia/angina Abnormal Stress test with normal cath. \par microvascualr ischemia or endothelial dysfunction. aspirin 81 mg . beta blocker \par 3) Diastolic heart failure: EF  45-50%  .  Not on ARB. if repeat echo poor, then emily start on ARB. also will evaluate for PVC burden. \par 4)CAD  aspirin and statins \par 5)  carotid plaque: Mild atherosclerosis. ct statsin  \par 6:  mild MR: \par 7: cramps and claudication in legs: PEMA with PVR and provocation. : Abnormal PEMA.  [id number] PEMA admn US arterial Duplex. \par 8. AAS screening: SMokign addicvtion: US aorta with IVC\par az

## 2021-04-16 NOTE — HISTORY OF PRESENT ILLNESS
[FreeTextEntry1] : F/u for abnormal EKG and MENCHACA\par \par HPI for today: : no problems.  she has troouoble hearing./  No LE edema.  Mild dyspnea.  alcohol on holidays. \par a glass of wine. compaitn with meds.\par \par \par \par old note:  feels good. taking toprol xl. no headahces. no dizzines.s no paliopationts. no syncoope\par + dyspnea on exertion  . oas emphysema. \par \par old note:  feels good. takes her meds regurl;ramon. \par Nothing bothers. Dyspnea one xertion chronic.  No change in shortness of breathe . COPD.\par she was started on toprol last visit. \par \par \par old note: patient had a got stress test done. it was abnormnalk. got cardiac cath. no epicardial disease. \par no chest pain. + dyspnea. has emphysema. NO headahces. No dizziness.\par she gets cramps in the legs all the time. worse when she walks. \par \par old note: tolerated surgery well. it was gall bladder surgery. no chest pain. no dyspnea. no  palopitations. No Swelling of feet. \par dyspnea on exertion on walking for 1 block. She has emphyseam. . no chest pain. no palpitations. \par

## 2021-04-16 NOTE — CARDIOLOGY SUMMARY
[___] : [unfilled] [LVEF ___%] : LVEF [unfilled]% [None] : no pulmonary hypertension [Normal] : normal LA size [Mild] : mild mitral regurgitation [de-identified] : apr 2019: 11% PVCs.  NSVT. 5 beats.  [de-identified] : Lipid profile: Total chol 181l; HDl - 62; LDL - 90\par \par carotid DUplex: july 2020: Mild plaque. No stenosis.

## 2021-04-16 NOTE — PHYSICAL EXAM
[General Appearance - Well Developed] : well developed [Normal Appearance] : normal appearance [Well Groomed] : well groomed [General Appearance - Well Nourished] : well nourished [No Deformities] : no deformities [General Appearance - In No Acute Distress] : no acute distress [Normal Conjunctiva] : the conjunctiva exhibited no abnormalities [Eyelids - No Xanthelasma] : the eyelids demonstrated no xanthelasmas [Normal Oral Mucosa] : normal oral mucosa [No Oral Pallor] : no oral pallor [No Oral Cyanosis] : no oral cyanosis [Normal Jugular Venous A Waves Present] : normal jugular venous A waves present [Normal Jugular Venous V Waves Present] : normal jugular venous V waves present [No Jugular Venous Reeys A Waves] : no jugular venous reyes A waves [Respiration, Rhythm And Depth] : normal respiratory rhythm and effort [Exaggerated Use Of Accessory Muscles For Inspiration] : no accessory muscle use [Auscultation Breath Sounds / Voice Sounds] : lungs were clear to auscultation bilaterally [Heart Rate And Rhythm] : heart rate and rhythm were normal [Heart Sounds] : normal S1 and S2 [Abdomen Soft] : soft [Abdomen Tenderness] : non-tender [Abdomen Mass (___ Cm)] : no abdominal mass palpated [Abnormal Walk] : normal gait [Nail Clubbing] : no clubbing of the fingernails [Cyanosis, Localized] : no localized cyanosis [Petechial Hemorrhages (___cm)] : no petechial hemorrhages [Skin Color & Pigmentation] : normal skin color and pigmentation [] : no rash [No Venous Stasis] : no venous stasis [Skin Lesions] : no skin lesions [No Skin Ulcers] : no skin ulcer [No Xanthoma] : no  xanthoma was observed [Affect] : the affect was normal [Oriented To Time, Place, And Person] : oriented to person, place, and time [Mood] : the mood was normal [No Anxiety] : not feeling anxious [FreeTextEntry1] : Varicose veins in Lower extremity

## 2021-05-14 ENCOUNTER — APPOINTMENT (OUTPATIENT)
Dept: CARDIOLOGY | Facility: CLINIC | Age: 81
End: 2021-05-14
Payer: MEDICARE

## 2021-05-14 PROCEDURE — 93306 TTE W/DOPPLER COMPLETE: CPT

## 2021-05-14 PROCEDURE — 99072 ADDL SUPL MATRL&STAF TM PHE: CPT

## 2021-05-14 PROCEDURE — 93923 UPR/LXTR ART STDY 3+ LVLS: CPT

## 2021-05-17 ENCOUNTER — APPOINTMENT (OUTPATIENT)
Dept: CARDIOLOGY | Facility: CLINIC | Age: 81
End: 2021-05-17
Payer: MEDICARE

## 2021-05-17 PROCEDURE — 93925 LOWER EXTREMITY STUDY: CPT

## 2021-05-17 PROCEDURE — 99072 ADDL SUPL MATRL&STAF TM PHE: CPT

## 2021-05-24 ENCOUNTER — TRANSCRIPTION ENCOUNTER (OUTPATIENT)
Age: 81
End: 2021-05-24

## 2021-05-26 ENCOUNTER — TRANSCRIPTION ENCOUNTER (OUTPATIENT)
Age: 81
End: 2021-05-26

## 2021-11-02 ENCOUNTER — APPOINTMENT (OUTPATIENT)
Dept: CARDIOLOGY | Facility: CLINIC | Age: 81
End: 2021-11-02
Payer: MEDICARE

## 2021-11-02 VITALS
RESPIRATION RATE: 16 BRPM | SYSTOLIC BLOOD PRESSURE: 135 MMHG | BODY MASS INDEX: 31.07 KG/M2 | WEIGHT: 182 LBS | HEIGHT: 64 IN | TEMPERATURE: 98 F | HEART RATE: 75 BPM | OXYGEN SATURATION: 95 % | DIASTOLIC BLOOD PRESSURE: 74 MMHG

## 2021-11-02 DIAGNOSIS — R06.00 DYSPNEA, UNSPECIFIED: ICD-10-CM

## 2021-11-02 DIAGNOSIS — R25.2 CRAMP AND SPASM: ICD-10-CM

## 2021-11-02 DIAGNOSIS — I49.3 VENTRICULAR PREMATURE DEPOLARIZATION: ICD-10-CM

## 2021-11-02 PROCEDURE — 93000 ELECTROCARDIOGRAM COMPLETE: CPT

## 2021-11-02 PROCEDURE — 99214 OFFICE O/P EST MOD 30 MIN: CPT

## 2021-11-05 ENCOUNTER — APPOINTMENT (OUTPATIENT)
Dept: CARDIOLOGY | Facility: CLINIC | Age: 81
End: 2021-11-05
Payer: MEDICARE

## 2021-11-05 PROCEDURE — 93306 TTE W/DOPPLER COMPLETE: CPT

## 2021-11-05 PROCEDURE — 93356 MYOCRD STRAIN IMG SPCKL TRCK: CPT

## 2021-11-05 PROCEDURE — 76376 3D RENDER W/INTRP POSTPROCES: CPT

## 2021-11-23 ENCOUNTER — TRANSCRIPTION ENCOUNTER (OUTPATIENT)
Age: 81
End: 2021-11-23

## 2022-02-04 NOTE — ASU PATIENT PROFILE, ADULT - BILL OF RIGHTS/ADMISSION INFORMATION PROVIDED TO:
Head, normocephalic, atraumatic, Face, Face within normal limits, Ears, External ears within normal limits, Nose/Nasopharynx, External nose normal appearance, nares patent, no nasal discharge, Mouth and Throat, Oral cavity appearance normal, Breath odor normal, Lips, Appearance normal Patient

## 2022-05-01 ENCOUNTER — RX RENEWAL (OUTPATIENT)
Age: 82
End: 2022-05-01

## 2022-05-27 ENCOUNTER — APPOINTMENT (OUTPATIENT)
Dept: CARDIOLOGY | Facility: CLINIC | Age: 82
End: 2022-05-27

## 2022-07-18 ENCOUNTER — OFFICE (OUTPATIENT)
Dept: URBAN - METROPOLITAN AREA CLINIC 104 | Facility: CLINIC | Age: 82
Setting detail: OPHTHALMOLOGY
End: 2022-07-18
Payer: MEDICARE

## 2022-07-18 DIAGNOSIS — H25.13: ICD-10-CM

## 2022-07-18 DIAGNOSIS — H40.013: ICD-10-CM

## 2022-07-18 DIAGNOSIS — H35.40: ICD-10-CM

## 2022-07-18 DIAGNOSIS — H25.813: ICD-10-CM

## 2022-07-18 PROCEDURE — 92134 CPTRZ OPH DX IMG PST SGM RTA: CPT | Performed by: OPTOMETRIST

## 2022-07-18 PROCEDURE — 92004 COMPRE OPH EXAM NEW PT 1/>: CPT | Performed by: OPTOMETRIST

## 2022-07-18 ASSESSMENT — REFRACTION_MANIFEST
OU_VA: 20/40
OS_AXIS: 180
OS_SPHERE: -3.00
OD_AXIS: 180
OS_VA1: 20/40
OD_VA1: 20/40
OS_CYLINDER: -0.50
OD_SPHERE: -2.50
OD_CYLINDER: -0.50

## 2022-07-18 ASSESSMENT — TONOMETRY
OD_IOP_MMHG: 17
OS_IOP_MMHG: 17

## 2022-07-18 ASSESSMENT — SPHEQUIV_DERIVED
OS_SPHEQUIV: -3.25
OD_SPHEQUIV: -2.75

## 2022-07-18 ASSESSMENT — CONFRONTATIONAL VISUAL FIELD TEST (CVF)
OS_FINDINGS: FULL
OD_FINDINGS: FULL

## 2022-07-18 ASSESSMENT — VISUAL ACUITY
OD_BCVA: 20/70-1
OS_BCVA: 20/50

## 2022-08-09 ENCOUNTER — APPOINTMENT (OUTPATIENT)
Dept: CARDIOLOGY | Facility: CLINIC | Age: 82
End: 2022-08-09

## 2022-08-09 ENCOUNTER — NON-APPOINTMENT (OUTPATIENT)
Age: 82
End: 2022-08-09

## 2022-08-09 VITALS
DIASTOLIC BLOOD PRESSURE: 68 MMHG | SYSTOLIC BLOOD PRESSURE: 140 MMHG | HEIGHT: 64 IN | TEMPERATURE: 98.4 F | BODY MASS INDEX: 30.05 KG/M2 | WEIGHT: 176 LBS | OXYGEN SATURATION: 93 % | HEART RATE: 94 BPM

## 2022-08-09 DIAGNOSIS — I50.32 CHRONIC DIASTOLIC (CONGESTIVE) HEART FAILURE: ICD-10-CM

## 2022-08-09 DIAGNOSIS — Z01.810 ENCOUNTER FOR PREPROCEDURAL CARDIOVASCULAR EXAMINATION: ICD-10-CM

## 2022-08-09 DIAGNOSIS — I42.0 DILATED CARDIOMYOPATHY: ICD-10-CM

## 2022-08-09 DIAGNOSIS — R68.89 OTHER GENERAL SYMPTOMS AND SIGNS: ICD-10-CM

## 2022-08-09 DIAGNOSIS — E78.5 HYPERLIPIDEMIA, UNSPECIFIED: ICD-10-CM

## 2022-08-09 PROCEDURE — 93000 ELECTROCARDIOGRAM COMPLETE: CPT

## 2022-08-09 PROCEDURE — 99214 OFFICE O/P EST MOD 30 MIN: CPT | Mod: 25

## 2022-08-09 RX ORDER — TIOTROPIUM BROMIDE 18 UG/1
18 CAPSULE ORAL; RESPIRATORY (INHALATION) DAILY
Qty: 1 | Refills: 3 | Status: DISCONTINUED | COMMUNITY
Start: 2019-06-27 | End: 2022-08-09

## 2022-08-09 RX ORDER — OMEGA-3/DHA/EPA/FISH OIL 300-1000MG
400 CAPSULE ORAL
Qty: 90 | Refills: 1 | Status: DISCONTINUED | COMMUNITY
Start: 2021-11-02 | End: 2022-08-09

## 2022-08-09 RX ORDER — UBIDECARENONE 200 MG
200 CAPSULE ORAL
Refills: 0 | Status: DISCONTINUED | COMMUNITY
Start: 2021-11-02 | End: 2022-08-09

## 2022-08-23 ENCOUNTER — OFFICE (OUTPATIENT)
Dept: URBAN - METROPOLITAN AREA CLINIC 104 | Facility: CLINIC | Age: 82
Setting detail: OPHTHALMOLOGY
End: 2022-08-23
Payer: MEDICARE

## 2022-08-23 DIAGNOSIS — H35.40: ICD-10-CM

## 2022-08-23 DIAGNOSIS — H43.813: ICD-10-CM

## 2022-08-23 DIAGNOSIS — H25.13: ICD-10-CM

## 2022-08-23 DIAGNOSIS — H25.12: ICD-10-CM

## 2022-08-23 DIAGNOSIS — H40.013: ICD-10-CM

## 2022-08-23 PROCEDURE — 76514 ECHO EXAM OF EYE THICKNESS: CPT | Performed by: SPECIALIST

## 2022-08-23 PROCEDURE — 92134 CPTRZ OPH DX IMG PST SGM RTA: CPT | Performed by: SPECIALIST

## 2022-08-23 PROCEDURE — 92136 OPHTHALMIC BIOMETRY: CPT | Performed by: SPECIALIST

## 2022-08-23 PROCEDURE — 92014 COMPRE OPH EXAM EST PT 1/>: CPT | Performed by: SPECIALIST

## 2022-08-23 ASSESSMENT — KERATOMETRY
OS_K2POWER_DIOPTERS: 42.99
OS_AXISANGLE2_DEGREES: 108
OS_K1POWER_DIOPTERS: 41.62
OS_AXISANGLE_DEGREES: 18
OS_CYLPOWER_DEGREES: 1.37
OD_CYLAXISANGLE_DEGREES: 72
OS_K1POWER_DIOPTERS: 41.62
OS_K1K2_AVERAGE: 42.3
OD_K1K2_AVERAGE: 42.33
OD_K1POWER_DIOPTERS: 41.82
OD_AXISANGLE_DEGREES: 72
OD_AXISANGLE2_DEGREES: 72
OD_CYLPOWER_DEGREES: 1.01
OD_K2POWER_DIOPTERS: 42.83
OD_AXISANGLE_DEGREES: 162
OS_CYLAXISANGLE_DEGREES: 108
OS_K2POWER_DIOPTERS: 42.99
OD_K2POWER_DIOPTERS: 42.83
OD_K1POWER_DIOPTERS: 41.82
OS_AXISANGLE_DEGREES: 108

## 2022-08-23 ASSESSMENT — VISUAL ACUITY
OS_BCVA: 20/50
OD_BCVA: 20/FC

## 2022-08-23 ASSESSMENT — SPHEQUIV_DERIVED
OD_SPHEQUIV: -2.75
OS_SPHEQUIV: -3.25
OS_SPHEQUIV: -3.875
OD_SPHEQUIV: -2.875

## 2022-08-23 ASSESSMENT — REFRACTION_MANIFEST
OS_VA1: 20/80
OD_AXIS: 180
OD_VA1: 20/50
OD_CYLINDER: -0.50
OS_CYLINDER: -0.50
OS_SPHERE: -3.00
OD_SPHERE: -2.50
OS_AXIS: 180

## 2022-08-23 ASSESSMENT — REFRACTION_AUTOREFRACTION
OD_AXIS: 152
OS_SPHERE: -3.50
OS_AXIS: 151
OD_SPHERE: -2.75
OD_CYLINDER: -0.25
OS_CYLINDER: -0.75

## 2022-08-23 ASSESSMENT — TONOMETRY
OS_IOP_MMHG: 17
OD_IOP_MMHG: 17

## 2022-08-23 ASSESSMENT — AXIALLENGTH_DERIVED
OD_AL: 25.25
OS_AL: 25.72
OS_AL: 25.43
OD_AL: 25.2

## 2022-08-23 ASSESSMENT — PACHYMETRY
OD_CT_UM: 551
OD_CT_CORRECTION: -1
OS_CT_CORRECTION: -4
OS_CT_UM: 596

## 2022-08-23 ASSESSMENT — CONFRONTATIONAL VISUAL FIELD TEST (CVF)
OD_FINDINGS: FULL
OS_FINDINGS: FULL

## 2022-09-22 ENCOUNTER — RX ONLY (RX ONLY)
Age: 82
End: 2022-09-22

## 2022-09-23 ENCOUNTER — ASC (OUTPATIENT)
Dept: URBAN - METROPOLITAN AREA SURGERY 8 | Facility: SURGERY | Age: 82
Setting detail: OPHTHALMOLOGY
End: 2022-09-23
Payer: MEDICARE

## 2022-09-23 DIAGNOSIS — H52.212: ICD-10-CM

## 2022-09-23 DIAGNOSIS — H25.12: ICD-10-CM

## 2022-09-23 PROCEDURE — FEMTO CATARACT LASER: Performed by: SPECIALIST

## 2022-09-23 PROCEDURE — 66984 XCAPSL CTRC RMVL W/O ECP: CPT | Performed by: SPECIALIST

## 2022-09-24 ENCOUNTER — OFFICE (OUTPATIENT)
Dept: URBAN - METROPOLITAN AREA CLINIC 104 | Facility: CLINIC | Age: 82
Setting detail: OPHTHALMOLOGY
End: 2022-09-24
Payer: MEDICARE

## 2022-09-24 DIAGNOSIS — Z96.1: ICD-10-CM

## 2022-09-24 PROCEDURE — 99024 POSTOP FOLLOW-UP VISIT: CPT | Performed by: OPTOMETRIST

## 2022-09-24 ASSESSMENT — CONFRONTATIONAL VISUAL FIELD TEST (CVF)
OD_FINDINGS: FULL
OS_FINDINGS: FULL

## 2022-09-24 ASSESSMENT — PACHYMETRY
OD_CT_UM: 551
OS_CT_CORRECTION: -4
OD_CT_CORRECTION: -1
OS_CT_UM: 596

## 2022-09-24 ASSESSMENT — VISUAL ACUITY
OS_BCVA: 20/70
OD_BCVA: 20/60

## 2022-09-24 ASSESSMENT — TONOMETRY: OS_IOP_MMHG: 20

## 2022-09-29 ENCOUNTER — RX ONLY (RX ONLY)
Age: 82
End: 2022-09-29

## 2022-09-29 ENCOUNTER — OFFICE (OUTPATIENT)
Dept: URBAN - METROPOLITAN AREA CLINIC 104 | Facility: CLINIC | Age: 82
Setting detail: OPHTHALMOLOGY
End: 2022-09-29
Payer: MEDICARE

## 2022-09-29 DIAGNOSIS — H25.11: ICD-10-CM

## 2022-09-29 DIAGNOSIS — Z96.1: ICD-10-CM

## 2022-09-29 PROCEDURE — 99024 POSTOP FOLLOW-UP VISIT: CPT | Performed by: OPTOMETRIST

## 2022-09-29 PROCEDURE — 92136 OPHTHALMIC BIOMETRY: CPT | Performed by: OPTOMETRIST

## 2022-09-29 ASSESSMENT — REFRACTION_AUTOREFRACTION
OD_CYLINDER: -2.00
OS_SPHERE: -2.00
OD_SPHERE: +0.50
OD_AXIS: 70
OS_CYLINDER: -1.00
OS_AXIS: 10

## 2022-09-29 ASSESSMENT — REFRACTION_MANIFEST
OD_AXIS: 70
OS_VA1: 20/25
OD_VA1: 20/50
OS_AXIS: 10
OD_SPHERE: +0.50
OS_SPHERE: -2.00
OS_CYLINDER: -1.00
OD_CYLINDER: -2.00

## 2022-09-29 ASSESSMENT — SPHEQUIV_DERIVED
OS_SPHEQUIV: -2.5
OD_SPHEQUIV: -0.5
OD_SPHEQUIV: -0.5
OS_SPHEQUIV: -2.5

## 2022-09-29 ASSESSMENT — PACHYMETRY
OS_CT_CORRECTION: -4
OS_CT_UM: 596
OD_CT_CORRECTION: -1
OD_CT_UM: 551

## 2022-09-29 ASSESSMENT — KERATOMETRY
OS_K1POWER_DIOPTERS: 41.50
OD_K2POWER_DIOPTERS: 42.25
OS_K2POWER_DIOPTERS: 42.75
OD_K1POWER_DIOPTERS: 41.00
OS_AXISANGLE_DEGREES: 90
OD_AXISANGLE_DEGREES: 90

## 2022-09-29 ASSESSMENT — VISUAL ACUITY
OS_BCVA: 20/70
OD_BCVA: 20/150

## 2022-09-29 ASSESSMENT — AXIALLENGTH_DERIVED
OS_AL: 25.1699
OS_AL: 25.1699
OD_AL: 24.5098
OD_AL: 24.5098

## 2022-09-29 ASSESSMENT — TONOMETRY: OS_IOP_MMHG: 12

## 2022-09-29 ASSESSMENT — CONFRONTATIONAL VISUAL FIELD TEST (CVF)
OD_FINDINGS: FULL
OS_FINDINGS: FULL

## 2022-09-30 ENCOUNTER — ASC (OUTPATIENT)
Dept: URBAN - METROPOLITAN AREA SURGERY 8 | Facility: SURGERY | Age: 82
Setting detail: OPHTHALMOLOGY
End: 2022-09-30
Payer: MEDICARE

## 2022-09-30 DIAGNOSIS — H25.11: ICD-10-CM

## 2022-09-30 PROCEDURE — 66984 XCAPSL CTRC RMVL W/O ECP: CPT | Performed by: SPECIALIST

## 2022-10-01 ENCOUNTER — OFFICE (OUTPATIENT)
Dept: URBAN - METROPOLITAN AREA CLINIC 104 | Facility: CLINIC | Age: 82
Setting detail: OPHTHALMOLOGY
End: 2022-10-01
Payer: MEDICARE

## 2022-10-01 DIAGNOSIS — Z96.1: ICD-10-CM

## 2022-10-01 PROCEDURE — 99024 POSTOP FOLLOW-UP VISIT: CPT | Performed by: OPTOMETRIST

## 2022-10-01 ASSESSMENT — SPHEQUIV_DERIVED
OS_SPHEQUIV: -2.5
OS_SPHEQUIV: -2.5
OD_SPHEQUIV: -0.5
OD_SPHEQUIV: -0.5

## 2022-10-01 ASSESSMENT — AXIALLENGTH_DERIVED
OD_AL: 24.5098
OD_AL: 24.5098
OS_AL: 25.1699
OS_AL: 25.1699

## 2022-10-01 ASSESSMENT — REFRACTION_AUTOREFRACTION
OD_SPHERE: +0.50
OD_CYLINDER: -2.00
OS_AXIS: 10
OD_AXIS: 70
OS_SPHERE: -2.00
OS_CYLINDER: -1.00

## 2022-10-01 ASSESSMENT — KERATOMETRY
OD_K1POWER_DIOPTERS: 41.00
OD_K2POWER_DIOPTERS: 42.25
OS_K1POWER_DIOPTERS: 41.50
OS_K2POWER_DIOPTERS: 42.75
OD_AXISANGLE_DEGREES: 90
OS_AXISANGLE_DEGREES: 90

## 2022-10-01 ASSESSMENT — CONFRONTATIONAL VISUAL FIELD TEST (CVF)
OD_FINDINGS: FULL
OS_FINDINGS: FULL

## 2022-10-01 ASSESSMENT — REFRACTION_MANIFEST
OS_VA1: 20/25
OD_VA1: 20/50
OD_SPHERE: +0.50
OD_CYLINDER: -2.00
OS_CYLINDER: -1.00
OD_AXIS: 70
OS_AXIS: 10
OS_SPHERE: -2.00

## 2022-10-01 ASSESSMENT — VISUAL ACUITY
OD_BCVA: 20/150
OS_BCVA: 20/150

## 2022-10-07 ENCOUNTER — OFFICE (OUTPATIENT)
Dept: URBAN - METROPOLITAN AREA CLINIC 104 | Facility: CLINIC | Age: 82
Setting detail: OPHTHALMOLOGY
End: 2022-10-07
Payer: MEDICARE

## 2022-10-07 DIAGNOSIS — Z96.1: ICD-10-CM

## 2022-10-07 PROCEDURE — 99024 POSTOP FOLLOW-UP VISIT: CPT | Performed by: OPTOMETRIST

## 2022-10-07 ASSESSMENT — KERATOMETRY
OD_K1POWER_DIOPTERS: 41.62
OS_AXISANGLE_DEGREES: 109
OD_AXISANGLE_DEGREES: 77
OS_K1POWER_DIOPTERS: 41.72
OD_K2POWER_DIOPTERS: 42.56
OS_K2POWER_DIOPTERS: 42.61

## 2022-10-07 ASSESSMENT — CONFRONTATIONAL VISUAL FIELD TEST (CVF)
OD_FINDINGS: FULL
OS_FINDINGS: FULL

## 2022-10-07 ASSESSMENT — PACHYMETRY
OS_CT_UM: 596
OD_CT_UM: 551
OD_CT_CORRECTION: -1
OS_CT_CORRECTION: -4

## 2022-10-07 ASSESSMENT — REFRACTION_AUTOREFRACTION
OD_AXIS: 175
OD_SPHERE: -1.75
OS_CYLINDER: -1.00
OD_CYLINDER: -1.00
OS_AXIS: 176
OS_SPHERE: -1.75

## 2022-10-07 ASSESSMENT — TONOMETRY: OD_IOP_MMHG: 16

## 2022-10-07 ASSESSMENT — SPHEQUIV_DERIVED
OD_SPHEQUIV: -2.25
OS_SPHEQUIV: -2.25

## 2022-10-07 ASSESSMENT — AXIALLENGTH_DERIVED
OD_AL: 25.07
OS_AL: 25.04

## 2022-10-07 ASSESSMENT — VISUAL ACUITY
OS_BCVA: 20/80
OD_BCVA: 20/200

## 2022-11-04 ENCOUNTER — OFFICE (OUTPATIENT)
Dept: URBAN - METROPOLITAN AREA CLINIC 104 | Facility: CLINIC | Age: 82
Setting detail: OPHTHALMOLOGY
End: 2022-11-04
Payer: MEDICARE

## 2022-11-04 DIAGNOSIS — Z96.1: ICD-10-CM

## 2022-11-04 PROBLEM — H43.813 POSTERIOR VITREOUS DETACHMENT; BOTH EYES: Status: ACTIVE | Noted: 2022-08-23

## 2022-11-04 PROBLEM — H35.40 PERIPAPILLARY ATROPHY ; RIGHT EYE: Status: ACTIVE | Noted: 2022-07-18

## 2022-11-04 PROBLEM — H40.013 GLAUCOMA SUSPECT, LOW RISK 1-2 FACTORS; BOTH EYES: Status: ACTIVE | Noted: 2022-07-18

## 2022-11-04 PROCEDURE — 99024 POSTOP FOLLOW-UP VISIT: CPT | Performed by: OPTOMETRIST

## 2022-11-04 ASSESSMENT — REFRACTION_MANIFEST
OD_SPHERE: -2.25
OD_AXIS: 003
OS_VA1: 20/40-2
OD_VA1: 20/25
OS_VA1: 20/25
OD_VA1: 20/30-1
OD_CYLINDER: -0.75
OD_ADD: +2.50
OS_CYLINDER: -0.50
OD_SPHERE: -1.75
OS_SPHERE: -2.00
OD_CYLINDER: -1.00
OS_ADD: +2.50
OS_SPHERE: -2.00
OS_AXIS: 180
OD_AXIS: 74

## 2022-11-04 ASSESSMENT — KERATOMETRY
OD_AXISANGLE_DEGREES: 088
OS_AXISANGLE_DEGREES: 100
OS_K1POWER_DIOPTERS: 41.77
OD_K2POWER_DIOPTERS: 42.94
OS_K2POWER_DIOPTERS: 42.88
OD_K1POWER_DIOPTERS: 41.77

## 2022-11-04 ASSESSMENT — REFRACTION_CURRENTRX
OD_CYLINDER: -0.75
OS_SPHERE: -2.00
OS_OVR_VA: 20/
OS_ADD: +2.50
OD_AXIS: 74
OD_ADD: +2.50
OD_SPHERE: -1.75
OD_OVR_VA: 20/

## 2022-11-04 ASSESSMENT — SPHEQUIV_DERIVED
OS_SPHEQUIV: -2.25
OD_SPHEQUIV: -2.125
OD_SPHEQUIV: -2.75
OD_SPHEQUIV: -2.75
OS_SPHEQUIV: -2.25

## 2022-11-04 ASSESSMENT — TONOMETRY
OS_IOP_MMHG: 20
OD_IOP_MMHG: 20

## 2022-11-04 ASSESSMENT — VISUAL ACUITY
OS_BCVA: 20/200
OD_BCVA: 20/200

## 2022-11-04 ASSESSMENT — AXIALLENGTH_DERIVED
OD_AL: 25.18
OS_AL: 24.98
OS_AL: 24.98
OD_AL: 25.18
OD_AL: 24.91

## 2022-11-04 ASSESSMENT — PACHYMETRY
OD_CT_UM: 551
OS_CT_UM: 596
OD_CT_CORRECTION: -1
OS_CT_CORRECTION: -4

## 2022-11-04 ASSESSMENT — REFRACTION_AUTOREFRACTION
OS_CYLINDER: -0.50
OS_SPHERE: -2.00
OD_AXIS: 003
OD_SPHERE: -2.25
OD_CYLINDER: -1.00
OS_AXIS: 180

## 2022-11-04 ASSESSMENT — CONFRONTATIONAL VISUAL FIELD TEST (CVF)
OD_FINDINGS: FULL
OS_FINDINGS: FULL

## 2022-11-09 NOTE — ASU DISCHARGE PLAN (ADULT/PEDIATRIC). - NOTIFY
Father of pt states he is not staying here any longer, frustrated that he has been here for approx 2hrs with no provider seeing his son for an ear infection. Father takes pt and walks out of ED. Inability to Tolerate Liquids or Foods/Bleeding that does not stop/Fever greater than 101/Pain not relieved by Medications

## 2022-12-20 ENCOUNTER — APPOINTMENT (OUTPATIENT)
Dept: CARDIOLOGY | Facility: CLINIC | Age: 82
End: 2022-12-20

## 2023-02-08 ENCOUNTER — NON-APPOINTMENT (OUTPATIENT)
Age: 83
End: 2023-02-08

## 2023-02-08 ENCOUNTER — APPOINTMENT (OUTPATIENT)
Dept: CARDIOLOGY | Facility: CLINIC | Age: 83
End: 2023-02-08
Payer: MEDICARE

## 2023-02-08 ENCOUNTER — INPATIENT (INPATIENT)
Facility: HOSPITAL | Age: 83
LOS: 5 days | Discharge: HOME | DRG: 193 | End: 2023-02-14
Attending: INTERNAL MEDICINE | Admitting: HOSPITALIST
Payer: MEDICARE

## 2023-02-08 VITALS
RESPIRATION RATE: 18 BRPM | TEMPERATURE: 98 F | OXYGEN SATURATION: 95 % | WEIGHT: 169.98 LBS | DIASTOLIC BLOOD PRESSURE: 78 MMHG | SYSTOLIC BLOOD PRESSURE: 162 MMHG | HEIGHT: 67 IN | HEART RATE: 85 BPM

## 2023-02-08 VITALS
HEIGHT: 64 IN | WEIGHT: 178.25 LBS | DIASTOLIC BLOOD PRESSURE: 85 MMHG | TEMPERATURE: 98 F | BODY MASS INDEX: 30.43 KG/M2 | SYSTOLIC BLOOD PRESSURE: 136 MMHG | OXYGEN SATURATION: 95 % | HEART RATE: 86 BPM

## 2023-02-08 VITALS — OXYGEN SATURATION: 86 %

## 2023-02-08 DIAGNOSIS — Z90.49 ACQUIRED ABSENCE OF OTHER SPECIFIED PARTS OF DIGESTIVE TRACT: Chronic | ICD-10-CM

## 2023-02-08 DIAGNOSIS — I42.0 DILATED CARDIOMYOPATHY: ICD-10-CM

## 2023-02-08 DIAGNOSIS — R09.02 HYPOXEMIA: ICD-10-CM

## 2023-02-08 DIAGNOSIS — R06.02 SHORTNESS OF BREATH: ICD-10-CM

## 2023-02-08 DIAGNOSIS — I25.10 ATHEROSCLEROTIC HEART DISEASE OF NATIVE CORONARY ARTERY W/OUT ANGINA PECTORIS: ICD-10-CM

## 2023-02-08 DIAGNOSIS — I10 ESSENTIAL (PRIMARY) HYPERTENSION: ICD-10-CM

## 2023-02-08 DIAGNOSIS — Z98.890 OTHER SPECIFIED POSTPROCEDURAL STATES: Chronic | ICD-10-CM

## 2023-02-08 DIAGNOSIS — Z98.89 OTHER SPECIFIED POSTPROCEDURAL STATES: Chronic | ICD-10-CM

## 2023-02-08 LAB
ALBUMIN SERPL ELPH-MCNC: 3.8 G/DL — SIGNIFICANT CHANGE UP (ref 3.3–5.2)
ALP SERPL-CCNC: 102 U/L — SIGNIFICANT CHANGE UP (ref 40–120)
ALT FLD-CCNC: 17 U/L — SIGNIFICANT CHANGE UP
ANION GAP SERPL CALC-SCNC: 9 MMOL/L — SIGNIFICANT CHANGE UP (ref 5–17)
AST SERPL-CCNC: 21 U/L — SIGNIFICANT CHANGE UP
BASE EXCESS BLDV CALC-SCNC: 3.3 MMOL/L — HIGH (ref -2–3)
BASOPHILS # BLD AUTO: 0.04 K/UL — SIGNIFICANT CHANGE UP (ref 0–0.2)
BASOPHILS NFR BLD AUTO: 0.5 % — SIGNIFICANT CHANGE UP (ref 0–2)
BILIRUB SERPL-MCNC: 0.5 MG/DL — SIGNIFICANT CHANGE UP (ref 0.4–2)
BUN SERPL-MCNC: 19.6 MG/DL — SIGNIFICANT CHANGE UP (ref 8–20)
CA-I SERPL-SCNC: 1.18 MMOL/L — SIGNIFICANT CHANGE UP (ref 1.15–1.33)
CALCIUM SERPL-MCNC: 9.2 MG/DL — SIGNIFICANT CHANGE UP (ref 8.4–10.5)
CHLORIDE BLDV-SCNC: 106 MMOL/L — SIGNIFICANT CHANGE UP (ref 96–108)
CHLORIDE SERPL-SCNC: 102 MMOL/L — SIGNIFICANT CHANGE UP (ref 96–108)
CO2 SERPL-SCNC: 26 MMOL/L — SIGNIFICANT CHANGE UP (ref 22–29)
CREAT SERPL-MCNC: 0.91 MG/DL — SIGNIFICANT CHANGE UP (ref 0.5–1.3)
D DIMER BLD IA.RAPID-MCNC: 405 NG/ML DDU — HIGH
EGFR: 63 ML/MIN/1.73M2 — SIGNIFICANT CHANGE UP
EOSINOPHIL # BLD AUTO: 0.15 K/UL — SIGNIFICANT CHANGE UP (ref 0–0.5)
EOSINOPHIL NFR BLD AUTO: 1.8 % — SIGNIFICANT CHANGE UP (ref 0–6)
FLUAV AG NPH QL: SIGNIFICANT CHANGE UP
FLUBV AG NPH QL: SIGNIFICANT CHANGE UP
GAS PNL BLDV: 137 MMOL/L — SIGNIFICANT CHANGE UP (ref 136–145)
GAS PNL BLDV: SIGNIFICANT CHANGE UP
GLUCOSE BLDV-MCNC: 140 MG/DL — HIGH (ref 70–99)
GLUCOSE SERPL-MCNC: 146 MG/DL — HIGH (ref 70–99)
HCO3 BLDV-SCNC: 29 MMOL/L — SIGNIFICANT CHANGE UP (ref 22–29)
HCT VFR BLD CALC: 38.6 % — SIGNIFICANT CHANGE UP (ref 34.5–45)
HCT VFR BLDA CALC: 36 % — SIGNIFICANT CHANGE UP
HGB BLD CALC-MCNC: 12.1 G/DL — SIGNIFICANT CHANGE UP (ref 11.7–16.1)
HGB BLD-MCNC: 12.4 G/DL — SIGNIFICANT CHANGE UP (ref 11.5–15.5)
IMM GRANULOCYTES NFR BLD AUTO: 0.5 % — SIGNIFICANT CHANGE UP (ref 0–0.9)
LACTATE BLDV-MCNC: 1.2 MMOL/L — SIGNIFICANT CHANGE UP (ref 0.5–2)
LYMPHOCYTES # BLD AUTO: 1.38 K/UL — SIGNIFICANT CHANGE UP (ref 1–3.3)
LYMPHOCYTES # BLD AUTO: 16.8 % — SIGNIFICANT CHANGE UP (ref 13–44)
MAGNESIUM SERPL-MCNC: 2.3 MG/DL — SIGNIFICANT CHANGE UP (ref 1.6–2.6)
MCHC RBC-ENTMCNC: 30 PG — SIGNIFICANT CHANGE UP (ref 27–34)
MCHC RBC-ENTMCNC: 32.1 GM/DL — SIGNIFICANT CHANGE UP (ref 32–36)
MCV RBC AUTO: 93.5 FL — SIGNIFICANT CHANGE UP (ref 80–100)
MONOCYTES # BLD AUTO: 0.58 K/UL — SIGNIFICANT CHANGE UP (ref 0–0.9)
MONOCYTES NFR BLD AUTO: 7.1 % — SIGNIFICANT CHANGE UP (ref 2–14)
NEUTROPHILS # BLD AUTO: 6 K/UL — SIGNIFICANT CHANGE UP (ref 1.8–7.4)
NEUTROPHILS NFR BLD AUTO: 73.3 % — SIGNIFICANT CHANGE UP (ref 43–77)
NT-PROBNP SERPL-SCNC: 135 PG/ML — SIGNIFICANT CHANGE UP (ref 0–300)
PCO2 BLDV: 57 MMHG — HIGH (ref 39–42)
PH BLDV: 7.32 — SIGNIFICANT CHANGE UP (ref 7.32–7.43)
PLATELET # BLD AUTO: 313 K/UL — SIGNIFICANT CHANGE UP (ref 150–400)
PO2 BLDV: <42 MMHG — SIGNIFICANT CHANGE UP (ref 25–45)
POTASSIUM BLDV-SCNC: 4.2 MMOL/L — SIGNIFICANT CHANGE UP (ref 3.5–5.1)
POTASSIUM SERPL-MCNC: 4.5 MMOL/L — SIGNIFICANT CHANGE UP (ref 3.5–5.3)
POTASSIUM SERPL-SCNC: 4.5 MMOL/L — SIGNIFICANT CHANGE UP (ref 3.5–5.3)
PROT SERPL-MCNC: 7.2 G/DL — SIGNIFICANT CHANGE UP (ref 6.6–8.7)
RBC # BLD: 4.13 M/UL — SIGNIFICANT CHANGE UP (ref 3.8–5.2)
RBC # FLD: 13.1 % — SIGNIFICANT CHANGE UP (ref 10.3–14.5)
RSV RNA NPH QL NAA+NON-PROBE: SIGNIFICANT CHANGE UP
SAO2 % BLDV: 60.1 % — SIGNIFICANT CHANGE UP
SARS-COV-2 RNA SPEC QL NAA+PROBE: SIGNIFICANT CHANGE UP
SODIUM SERPL-SCNC: 137 MMOL/L — SIGNIFICANT CHANGE UP (ref 135–145)
TROPONIN T SERPL-MCNC: <0.01 NG/ML — SIGNIFICANT CHANGE UP (ref 0–0.06)
WBC # BLD: 8.19 K/UL — SIGNIFICANT CHANGE UP (ref 3.8–10.5)
WBC # FLD AUTO: 8.19 K/UL — SIGNIFICANT CHANGE UP (ref 3.8–10.5)

## 2023-02-08 PROCEDURE — 71045 X-RAY EXAM CHEST 1 VIEW: CPT | Mod: 26

## 2023-02-08 PROCEDURE — 99215 OFFICE O/P EST HI 40 MIN: CPT

## 2023-02-08 PROCEDURE — 93000 ELECTROCARDIOGRAM COMPLETE: CPT

## 2023-02-08 PROCEDURE — 71275 CT ANGIOGRAPHY CHEST: CPT | Mod: 26,MA

## 2023-02-08 PROCEDURE — 99291 CRITICAL CARE FIRST HOUR: CPT

## 2023-02-08 RX ORDER — AZITHROMYCIN 500 MG/1
500 TABLET, FILM COATED ORAL ONCE
Refills: 0 | Status: COMPLETED | OUTPATIENT
Start: 2023-02-08 | End: 2023-02-08

## 2023-02-08 RX ORDER — IPRATROPIUM/ALBUTEROL SULFATE 18-103MCG
3 AEROSOL WITH ADAPTER (GRAM) INHALATION
Refills: 0 | Status: COMPLETED | OUTPATIENT
Start: 2023-02-08 | End: 2023-02-08

## 2023-02-08 RX ORDER — MAGNESIUM SULFATE 500 MG/ML
2 VIAL (ML) INJECTION ONCE
Refills: 0 | Status: COMPLETED | OUTPATIENT
Start: 2023-02-08 | End: 2023-02-08

## 2023-02-08 RX ORDER — CEFTRIAXONE 500 MG/1
1000 INJECTION, POWDER, FOR SOLUTION INTRAMUSCULAR; INTRAVENOUS ONCE
Refills: 0 | Status: COMPLETED | OUTPATIENT
Start: 2023-02-08 | End: 2023-02-08

## 2023-02-08 RX ADMIN — Medication 125 MILLIGRAM(S): at 18:27

## 2023-02-08 RX ADMIN — Medication 3 MILLILITER(S): at 18:42

## 2023-02-08 RX ADMIN — Medication 3 MILLILITER(S): at 19:07

## 2023-02-08 RX ADMIN — AZITHROMYCIN 255 MILLIGRAM(S): 500 TABLET, FILM COATED ORAL at 23:38

## 2023-02-08 RX ADMIN — CEFTRIAXONE 100 MILLIGRAM(S): 500 INJECTION, POWDER, FOR SOLUTION INTRAMUSCULAR; INTRAVENOUS at 23:37

## 2023-02-08 RX ADMIN — Medication 3 MILLILITER(S): at 18:27

## 2023-02-08 RX ADMIN — Medication 25 GRAM(S): at 19:58

## 2023-02-08 NOTE — ED PROVIDER NOTE - PHYSICAL EXAMINATION
Constitutional: Well appearing, awake, alert, oriented to person, place, and time/situation and in no apparent distress  ENMT: Airway patent nasal mucosa clear. Mouth with normal mucosa. Throat has no vesicles no oropharyngeal exudates and uvula is midline. No blood in the oropharynx  EYES: clear bilaterally, pupils equal, round and reactive to light  Cardiac: Regular rate, regular rhythm. Heart sounds S1, S2. No murmurs, rubs or gallops. Good capillary refill, 2+ pulses, 2+ bilateral pitting edema   Respiratory: Bilateral wheezes, no use of accessory muscles, satting 86% on RA at rest with improvement to 93% on 3L NC  Gastrointestinal: Abdomen nondistended, non-tender, no rebound guarding or peritoneal signs  Genitourinary: No CVA tenderness, pelvis nontender, bladder nondistended  Musculoskeletal: Spine appears normal, range of motion is not limited, no muscle or joint tenderness  Neurological: Alert and oriented, no focal deficits, no motor or sensory deficits. CN 2-12 intact, PERRLA, EOMI, No FND, moving all extremities equally  Skin: Skin normal color for race, warm, dry and intact, No evidence of rash

## 2023-02-08 NOTE — ED ADULT NURSE REASSESSMENT NOTE - NS ED NURSE REASSESS COMMENT FT1
Assumed care of pt at 19:15 as stated in report from FRANCES Serrato. Charting as noted. Patient A&O x4, denies pain/discomfort, denies CP/SOB. Updated on the plan of care. Call bell within reach, bed locked in lowest position. IV site flushed w/ NS. No redness, swelling or pain noted to site. No signs of acute distress noted, safety maintained. Pt remains on CM in NSR.
pt on cardiac monitor in NSR, pt resting comfortably showing no signs of respiratory distress or pain, the pt is calm and cooperative

## 2023-02-08 NOTE — ED PROVIDER NOTE - NSICDXPASTMEDICALHX_GEN_ALL_CORE_FT
PAST MEDICAL HISTORY:  Abnormal stress echo 5/2/2019 suggestive of CAD    Abnormal stress test 11-7-2017, cath done 11/21/17 was negitive    CAD (coronary artery disease)     Cholelithiasis newly diagnosed.    Emphysema     GERD (gastroesophageal reflux disease)     HLD (hyperlipidemia)     Incisional hernia     MR (mitral regurgitation) mild    Obese     Osteopenia after menopause

## 2023-02-08 NOTE — ED ADULT NURSE NOTE - HIV OFFER
Xerosis Normal Treatment: I recommended application of Cetaphil or CeraVe numerous times a day and before going to bed to all dry areas. Opt out

## 2023-02-08 NOTE — ED ADULT TRIAGE NOTE - CHIEF COMPLAINT QUOTE
patient c/o difficulty breathing for the past couple of days, went to MD office and was found to breath more difficult than usual with a wet cough. denies fever or chills. was found to be hypoxic at 84% room air.

## 2023-02-08 NOTE — ED ADULT NURSE NOTE - OBJECTIVE STATEMENT
Patient having worsening SOB since last Thursday, pt has cough causing chest discomfort, pt went to Dr today and was found to be hypoxic 84%

## 2023-02-08 NOTE — ED PROVIDER NOTE - CLINICAL SUMMARY MEDICAL DECISION MAKING FREE TEXT BOX
Patient is a 81 yo female with PMHx CAD, GERD, emphysema not on home o2, MR, HLD presenting from cardiology office with hypoxia and exertional dyspnea as well as a productive cough for several weeks. Patient states she has had several months of bilateral peripheral edema and has had exertional dyspnea and cough productive of clear sputum for several weeks. Patient satting 86% on RA in no distress with improvement to 93% on 3L NC, bilateral wheezes, 2+ pitting edema, belly soft nontender.    Will check labs, evaluate for ACS with troponin EKG, d-dimer to evaluate for PE, r/o HF with CXR BNP, evaluate for infection with viral swab cxr, give nebs steroids for likely reactive airway disease, reassess.

## 2023-02-08 NOTE — ED ADULT NURSE NOTE - NSIMPLEMENTINTERV_GEN_ALL_ED
Implemented All Fall Risk Interventions:  [place] to call system. Call bell, personal items and telephone within reach. Instruct patient to call for assistance. Room bathroom lighting operational. Non-slip footwear when patient is off stretcher. Physically safe environment: no spills, clutter or unnecessary equipment. Stretcher in lowest position, wheels locked, appropriate side rails in place. Provide visual cue, wrist band, yellow gown, etc. Monitor gait and stability. Monitor for mental status changes and reorient to person, place, and time. Review medications for side effects contributing to fall risk. Reinforce activity limits and safety measures with patient and family.

## 2023-02-08 NOTE — ED PROVIDER NOTE - OBJECTIVE STATEMENT
Patient is a 83 yo female with PMHx CAD, GERD, emphysema not on home o2, MR, HLD presenting from cardiology office with hypoxia and exertional dyspnea as well as a productive cough for several weeks. Patient is a 83 yo female with PMHx CAD, GERD, emphysema not on home o2, MR, HLD presenting from cardiology office with hypoxia and exertional dyspnea as well as a productive cough for several weeks. Patient states she has had several months of bilateral peripheral edema and has had exertional dyspnea and cough productive of clear sputum for several weeks. Denies fever, n/v, CP, diaphoresis, syncope, dizziness, lightheadedness. Patient went to see her cardiologist today and was found to be hypoxic to 85% at which point she was told to come in to the ED. Denies fevers, chills, dizziness, lightheadedness, dysphagia, dysarthria, diplopia, photophobia, syncope, CP, abdominal pain, neck pain, back pain, diarrhea, dysuria, hematuria, hematochezia, hematemesis, n/v, recent travel, sick contacts.

## 2023-02-08 NOTE — ED PROVIDER NOTE - ATTENDING CONTRIBUTION TO CARE
82yoF; with PMH signif for CAD, GERD, Emphysema, Mitral Regurg, HTN, HLD; now p/w jiménez and cough x3 weeks, progressively worsening with sob.  c/o increasing malaise and dizziness. found to be hypoxic upon arrival to cardiology office today with O2 of 85%.    General:  mild resp distresss  Head:     NC/AT, EOMI, oral mucosa moist  Neck:     trachea midline  Lungs:    exp wheeze, bilateral crackles R>L  CVS:     S1S2, RRR, no m/g/r  Abd:     +BS, s/nt/nd, no organomegaly  Ext:    2+ radial and pedal pulses, trace pedal edema.   Neuro: AAOx3, no sensory/motor deficits  A/P:  82yoF p/w jiménez/cough, found to be hypoxic.  copd exacerbation vs chf vs pe.   -labs, ekg, cardiac monitor, cta to r/o pe. nebs, steroids, re-eval.     Upon my evaluation, this patient had a high probability of imminent or life-threatening deterioration due to HYPOXIA , which required my direct attention, intervention, and personal management.    I have personally provided _30 minutes of critical care time exclusive of time spent on separately billable procedures.  Time includes review of laboratory data, radiology results, discussion with consultants, and monitoring for potential decompensation.  Interventions were performed as documented.

## 2023-02-09 DIAGNOSIS — I50.32 CHRONIC DIASTOLIC (CONGESTIVE) HEART FAILURE: ICD-10-CM

## 2023-02-09 DIAGNOSIS — R06.09 OTHER FORMS OF DYSPNEA: ICD-10-CM

## 2023-02-09 DIAGNOSIS — J18.9 PNEUMONIA, UNSPECIFIED ORGANISM: ICD-10-CM

## 2023-02-09 DIAGNOSIS — E78.5 HYPERLIPIDEMIA, UNSPECIFIED: ICD-10-CM

## 2023-02-09 LAB
RAPID RVP RESULT: SIGNIFICANT CHANGE UP
SARS-COV-2 RNA SPEC QL NAA+PROBE: SIGNIFICANT CHANGE UP

## 2023-02-09 PROCEDURE — 99223 1ST HOSP IP/OBS HIGH 75: CPT

## 2023-02-09 RX ORDER — CHOLECALCIFEROL (VITAMIN D3) 125 MCG
1 CAPSULE ORAL
Qty: 0 | Refills: 0 | DISCHARGE

## 2023-02-09 RX ORDER — ALENDRONATE SODIUM 70 MG/1
1 TABLET ORAL
Qty: 0 | Refills: 0 | DISCHARGE

## 2023-02-09 RX ORDER — IPRATROPIUM/ALBUTEROL SULFATE 18-103MCG
3 AEROSOL WITH ADAPTER (GRAM) INHALATION EVERY 6 HOURS
Refills: 0 | Status: COMPLETED | OUTPATIENT
Start: 2023-02-09 | End: 2023-02-11

## 2023-02-09 RX ORDER — METOPROLOL TARTRATE 50 MG
1 TABLET ORAL
Qty: 0 | Refills: 0 | DISCHARGE

## 2023-02-09 RX ORDER — ASPIRIN/CALCIUM CARB/MAGNESIUM 324 MG
81 TABLET ORAL DAILY
Refills: 0 | Status: DISCONTINUED | OUTPATIENT
Start: 2023-02-09 | End: 2023-02-14

## 2023-02-09 RX ORDER — ENOXAPARIN SODIUM 100 MG/ML
40 INJECTION SUBCUTANEOUS EVERY 24 HOURS
Refills: 0 | Status: DISCONTINUED | OUTPATIENT
Start: 2023-02-09 | End: 2023-02-14

## 2023-02-09 RX ORDER — ATORVASTATIN CALCIUM 80 MG/1
20 TABLET, FILM COATED ORAL AT BEDTIME
Refills: 0 | Status: DISCONTINUED | OUTPATIENT
Start: 2023-02-09 | End: 2023-02-14

## 2023-02-09 RX ORDER — PANTOPRAZOLE SODIUM 20 MG/1
40 TABLET, DELAYED RELEASE ORAL
Refills: 0 | Status: DISCONTINUED | OUTPATIENT
Start: 2023-02-09 | End: 2023-02-14

## 2023-02-09 RX ORDER — BUDESONIDE AND FORMOTEROL FUMARATE DIHYDRATE 160; 4.5 UG/1; UG/1
2 AEROSOL RESPIRATORY (INHALATION)
Refills: 0 | Status: DISCONTINUED | OUTPATIENT
Start: 2023-02-09 | End: 2023-02-14

## 2023-02-09 RX ORDER — CHOLECALCIFEROL (VITAMIN D3) 125 MCG
1000 CAPSULE ORAL DAILY
Refills: 0 | Status: DISCONTINUED | OUTPATIENT
Start: 2023-02-09 | End: 2023-02-14

## 2023-02-09 RX ORDER — ASPIRIN/CALCIUM CARB/MAGNESIUM 324 MG
1 TABLET ORAL
Qty: 0 | Refills: 0 | DISCHARGE

## 2023-02-09 RX ORDER — ACETAMINOPHEN 500 MG
650 TABLET ORAL EVERY 6 HOURS
Refills: 0 | Status: DISCONTINUED | OUTPATIENT
Start: 2023-02-09 | End: 2023-02-14

## 2023-02-09 RX ORDER — ATORVASTATIN CALCIUM 80 MG/1
1 TABLET, FILM COATED ORAL
Qty: 0 | Refills: 0 | DISCHARGE

## 2023-02-09 RX ORDER — OMEPRAZOLE 10 MG/1
1 CAPSULE, DELAYED RELEASE ORAL
Qty: 0 | Refills: 0 | DISCHARGE

## 2023-02-09 RX ORDER — FLUTICASONE PROPIONATE AND SALMETEROL 50; 250 UG/1; UG/1
1 POWDER ORAL; RESPIRATORY (INHALATION)
Qty: 0 | Refills: 0 | DISCHARGE

## 2023-02-09 RX ORDER — CEFTRIAXONE 500 MG/1
1000 INJECTION, POWDER, FOR SOLUTION INTRAMUSCULAR; INTRAVENOUS EVERY 24 HOURS
Refills: 0 | Status: DISCONTINUED | OUTPATIENT
Start: 2023-02-09 | End: 2023-02-14

## 2023-02-09 RX ORDER — AZITHROMYCIN 500 MG/1
500 TABLET, FILM COATED ORAL EVERY 24 HOURS
Refills: 0 | Status: DISCONTINUED | OUTPATIENT
Start: 2023-02-09 | End: 2023-02-13

## 2023-02-09 RX ADMIN — PANTOPRAZOLE SODIUM 40 MILLIGRAM(S): 20 TABLET, DELAYED RELEASE ORAL at 06:21

## 2023-02-09 RX ADMIN — CEFTRIAXONE 1000 MILLIGRAM(S): 500 INJECTION, POWDER, FOR SOLUTION INTRAMUSCULAR; INTRAVENOUS at 05:52

## 2023-02-09 RX ADMIN — Medication 3 MILLILITER(S): at 08:51

## 2023-02-09 RX ADMIN — Medication 1000 UNIT(S): at 14:58

## 2023-02-09 RX ADMIN — ATORVASTATIN CALCIUM 20 MILLIGRAM(S): 80 TABLET, FILM COATED ORAL at 21:52

## 2023-02-09 RX ADMIN — Medication 3 MILLILITER(S): at 15:11

## 2023-02-09 RX ADMIN — BUDESONIDE AND FORMOTEROL FUMARATE DIHYDRATE 2 PUFF(S): 160; 4.5 AEROSOL RESPIRATORY (INHALATION) at 20:53

## 2023-02-09 RX ADMIN — Medication 81 MILLIGRAM(S): at 14:58

## 2023-02-09 RX ADMIN — Medication 40 MILLIGRAM(S): at 14:58

## 2023-02-09 RX ADMIN — Medication 3 MILLILITER(S): at 20:53

## 2023-02-09 RX ADMIN — Medication 40 MILLIGRAM(S): at 05:53

## 2023-02-09 RX ADMIN — BUDESONIDE AND FORMOTEROL FUMARATE DIHYDRATE 2 PUFF(S): 160; 4.5 AEROSOL RESPIRATORY (INHALATION) at 08:50

## 2023-02-09 RX ADMIN — AZITHROMYCIN 255 MILLIGRAM(S): 500 TABLET, FILM COATED ORAL at 05:53

## 2023-02-09 RX ADMIN — ENOXAPARIN SODIUM 40 MILLIGRAM(S): 100 INJECTION SUBCUTANEOUS at 05:53

## 2023-02-09 NOTE — H&P ADULT - NSHPPHYSICALEXAM_GEN_ALL_CORE
Vital Signs Last 24 Hrs  T(C): 36.4 (09 Feb 2023 04:18), Max: 36.7 (08 Feb 2023 16:55)  T(F): 97.5 (09 Feb 2023 04:18), Max: 98.1 (08 Feb 2023 16:55)  HR: 89 (09 Feb 2023 04:18) (71 - 96)  BP: 138/80 (09 Feb 2023 04:18) (115/67 - 162/78)  BP(mean): 83 (09 Feb 2023 00:02) (83 - 83)  RR: 18 (09 Feb 2023 04:18) (18 - 18)  SpO2: 93% (09 Feb 2023 04:18) (93% - 96%)    Parameters below as of 09 Feb 2023 04:18  Patient On (Oxygen Delivery Method): nasal cannula  O2 Flow (L/min): 3    GENERAL:  Well-appearing elderly female, not in acute distress, hard of hearing (left hearing aides at home)  EYES:  Clear conjunctiva, extraocular movement intact  ENT: Moist mucous membranes  RESP:  Non-labored breathing pattern, diminished breath sounds bilaterally   CV: Regular rate and rhythm, no murmurs appreciated, no lower extremity edema  GI: Soft, non-tender, non-distended  NEURO: Awake, alert, conversant, upper and lower extremity strength 5/5, light touch sensation grossly intact  PSYCH: Calm, cooperative  SKIN: No rash or lesions, warm and dry Vital Signs Last 24 Hrs  T(C): 36.4 (09 Feb 2023 04:18), Max: 36.7 (08 Feb 2023 16:55)  T(F): 97.5 (09 Feb 2023 04:18), Max: 98.1 (08 Feb 2023 16:55)  HR: 89 (09 Feb 2023 04:18) (71 - 96)  BP: 138/80 (09 Feb 2023 04:18) (115/67 - 162/78)  BP(mean): 83 (09 Feb 2023 00:02) (83 - 83)  RR: 18 (09 Feb 2023 04:18) (18 - 18)  SpO2: 93% (09 Feb 2023 04:18) (93% - 96%)    Parameters below as of 09 Feb 2023 04:18  Patient On (Oxygen Delivery Method): nasal cannula  O2 Flow (L/min): 3    GENERAL:  Well-appearing elderly female, not in acute distress, hard of hearing (left hearing aides at home)  EYES:  Clear conjunctiva, extraocular movement intact  ENT: Moist mucous membranes  RESP:  Non-labored breathing pattern, diminished breath sounds bilaterally   CV: Regular rate and rhythm, no murmurs appreciated, mild bilateral leg extremity edema  GI: Soft, non-tender, non-distended  NEURO: Awake, alert, conversant, upper and lower extremity strength 5/5, light touch sensation grossly intact  PSYCH: Calm, cooperative  SKIN: No rash or lesions, warm and dry

## 2023-02-09 NOTE — CONSULT NOTE ADULT - PROBLEM SELECTOR RECOMMENDATION 2
Euvolemic on exam. pBNP 135  Continue BB with strict parameters  Maintain K+>4 and mag >2  DASH diet  Daily weight monitor  Strict I&Os Monitor  Venous Doppler lower extremities r/o DVT

## 2023-02-09 NOTE — CONSULT NOTE ADULT - SUBJECTIVE AND OBJECTIVE BOX
[facility]                                              CARDIOLOGY AT [place] [address]                                             Telephone: [phone]. Fax:[phone]      CARDIOLOGY CONSULTATION NOTE                                                                                             History obtained by: Patient and medical record  Community Cardiologist: Dr Taylor    obtained: No   Reason for Consultation: MENCHACA    Chief complaint:   Patient is a 82y old  Female who presents with a chief complaint of MENCHACA since last Thursday.    HPI: Patient is a 83 yo female with PMHx CAD, GERD, COPD (not on home O2), MR, diastolic HF EF 50 % and HLD presenting from cardiology office with hypoxia and exertional dyspnea as well as a productive cough. Patient states she has had several months of bilateral peripheral edema, has had productive cough with clear sputum and runny nose since January and has had exertional dyspnea since last Thursday.  Patient went to see her cardiologist today and was found to be hypoxic to 85% at which point she was told to come in to the ED. Denies fever, n/v, CP, diaphoresis, syncope, dizziness, lightheadedness, recent travel, or sick contacts.    CARDIAC TESTING     Cardiac Cath Lab - Adult (05.29.19 @ 13:51) >  VENTRICLES: No LV gram was performed; however, a recent echocardiogram  demonstrated an EF of 50 %.  CORONARY VESSELS: The coronary circulation is right dominant.  LM:   --  LM: Normal.  LAD:   --  LAD: Normal.  CX:   --  Circumflex: Normal.  RCA:   --  Proximal RCA: There was a 20 % stenosis.  --  Mid RCA: There was a 20 % stenosis.  DIAGNOSTIC IMPRESSIONS: Mild CAD.  DIAGNOSTIC RECOMMENDATIONS: Unclear etiology for wall motion on echo.  Medical management of mild CAD.  < end of copied text >    Nuclear Stress Test-Pharmacologic (07.01.15 @ 10:52) >  STRESS TEST IMPRESSIONS:  Chest Pain: No chest pain with administration of  Regadenoson.  Symptom: No Symptom.  HR Response: Appropriate.  BP Response: Appropriate.  Heart Rhythm: Normal Sinus Rhythm - 63 BPM.  ECG Abnormalities: There were no diagnostic changes.  Arrhythmia: rare PVC.  < end of copied text >    PAST MEDICAL HISTORY  Emphysema  Hyperlipidemia  Incisional hernia  Obese  Diastolic dysfunction  MR (mitral regurgitation)  Abnormal stress test  CAD (coronary artery disease)  GERD (gastroesophageal reflux disease)  Cholelithiasis  Osteopenia after menopause    PAST SURGICAL HISTORY  S/P small bowel resection  H/O hernia repair  S/P cholecystectomy    SOCIAL HISTORY: Denies smoking/alcohol/drugs    FAMILY HISTORY:  Family history of cardiac disorder mother    Family History of Cardiovascular Disease:  Yes   Coronary Artery Disease in first degree relative: Yes  Sudden Cardiac Death in First degree relative: No     HOME MEDICATIONS:   Advair Diskus 250 mcg-50 mcg inhalation powder: 1 puff(s) inhaled 2 times a day (29 May 2019 11:50)  alendronate 35 mg oral tablet: 1 tab(s) orally once a week (29 May 2019 11:50)  aspirin 81 mg oral delayed release tablet: 1 tab(s) orally once a day (29 May 2019 11:50)  atorvastatin 20 mg oral tablet: 1 tab(s) orally once a day (at bedtime) (29 May 2019 11:50)  Metoprolol Succinate ER 25 mg oral tablet, extended release: 1 tab(s) orally once a day (29 May 2019 11:50)  omeprazole 20 mg oral delayed release capsule: 1 cap(s) orally once a day (29 May 2019 11:50)    CURRENT CARDIAC MEDICATIONS:     CURRENT OTHER MEDICATIONS:     ALLERGIES:   No Known Allergies    REVIEW OF SYMPTOMS:   CONSTITUTIONAL: No fever, no chills, no weight loss, no weight gain, no fatigue   ENMT:  No vertigo; No sinus or throat pain  NECK: No pain or stiffness  CARDIOVASCULAR: No chest pain, + MENCHACA, no syncope/presyncope, no fatigue, no palpitations, no dizziness, no Orthopnea, no Paroxsymal nocturnal dyspnea  RESPIRATORY: + cough, no wheezing  : No dysuria, no hematuria   GI: no nausea, no diarrhea, no constipation, no abdominal pain,  NEURO: No headache, no slurred speech   MUSCULOSKELETAL: No joint pain or swelling; No muscle, back, or extremity pain  PSYCH: No agitation, no anxiety.    ALL OTHER REVIEW OF SYSTEMS ARE NEGATIVE.    VITAL SIGNS:   T(C): 36.6 (02-09-23 @ 00:02), Max: 36.7 (02-08-23 @ 16:55)  T(F): 97.9 (02-09-23 @ 00:02), Max: 98.1 (02-08-23 @ 16:55)  HR: 96 (02-09-23 @ 00:02) (71 - 96)  BP: 115/67 (02-09-23 @ 00:02) (115/67 - 162/78)  RR: 18 (02-09-23 @ 00:02) (18 - 18)  SpO2: 94% (02-09-23 @ 00:02) (94% - 96%)     PHYSICAL EXAM:   Constitutional: Comfortable . No acute distress.   HEENT: Atraumatic and normocephalic , neck is supple . no JVD.   CNS: A&Ox3. No focal deficits.   Respiratory: unlabored. No wheezing, No rhonchi. + crackles b/l and + decreased lung sounds b/l   Cardiovascular: RRR normal s1 s2. No murmur. No rubs or gallop.  Gastrointestinal: Soft, non-tender. +Bowel sounds.   Extremities: 2+ Peripheral Pulses, + 1 non-pitting edema LE b/l  Psychiatric: Calm . no agitation.   Skin: Warm and dry    LABS:   ( 08 Feb 2023 17:03 )  Troponin T  <0.01,  CPK  X    , CKMB  X    ,                           12.4   8.19  )-----------( 313      ( 08 Feb 2023 17:03 )             38.6     02-08    137  |  102  |  19.6  ----------------------------<  146<H>  4.5   |  26.0  |  0.91    Ca    9.2      08 Feb 2023 17:03  Mg     2.3     02-08    TPro  7.2  /  Alb  3.8  /  TBili  0.5  /  DBili  x   /  AST  21  /  ALT  17  /  AlkPhos  102  02-08    D-Dimer Assay, Quantitative: 405:     ECG:   Prior ECG: Yes     RADIOLOGY & ADDITIONAL STUDIES:     Xray Chest 1 View- PORTABLE-Urgent (02.08.23 @ 19:09) >  IMPRESSION:   RIGHT lower zone diffuse airspace consolidation..  < end of copied text >    CT Angio Chest PE Protocol w/ IV Cont (02.08.23 @ 22:34) >  IMPRESSION:  No pulmonary embolism.  Mild emphysema. Dependent atelectatic changes. Airspace and reticular   opacities in the right lung primarily the right middle lobe and lower   lobe which is suspicious for pneumonia. Correlate clinically and follow   to resolution.  There is a spiculated nodule in the left upper lobe measuring 1.4 cm   (5:29). This could represent malignancy. Pulmonary evaluation is   recommended.  < end of copied text >    Preliminary evaluation, please await official recommendations     Assessment and recommendations are final when note is signed by the attending.                                      [facility]                                              CARDIOLOGY AT [place] [address]                                             Telephone: [phone]. Fax:[phone]      CARDIOLOGY CONSULTATION NOTE                                                                                             History obtained by: Patient and medical record  Community Cardiologist: Dr Taylor    obtained: No   Reason for Consultation: MENCHACA    Chief complaint:   Patient is a 82y old  Female who presents with a chief complaint of MENCHACA since last Thursday.    HPI: Patient is a 81 yo female with PMHx CAD, GERD, COPD (not on home O2), MR, diastolic HF EF 50 % and HLD presenting from cardiology office with hypoxia and exertional dyspnea as well as a productive cough. Patient states she has had several months of bilateral peripheral edema, has had productive cough with clear sputum and runny nose since January and has had exertional dyspnea since last Thursday.  Patient went to see her cardiologist today and was found to be hypoxic to 85% at which point she was told to come in to the ED. Denies fever, n/v, CP, diaphoresis, syncope, dizziness, lightheadedness, recent travel, or sick contacts.    CARDIAC TESTING     Cardiac Cath Lab - Adult (05.29.19 @ 13:51) >  VENTRICLES: No LV gram was performed; however, a recent echocardiogram  demonstrated an EF of 50 %.  CORONARY VESSELS: The coronary circulation is right dominant.  LM:   --  LM: Normal.  LAD:   --  LAD: Normal.  CX:   --  Circumflex: Normal.  RCA:   --  Proximal RCA: There was a 20 % stenosis.  --  Mid RCA: There was a 20 % stenosis.  DIAGNOSTIC IMPRESSIONS: Mild CAD.  DIAGNOSTIC RECOMMENDATIONS: Unclear etiology for wall motion on echo.  Medical management of mild CAD.  < end of copied text >    Nuclear Stress Test-Pharmacologic (07.01.15 @ 10:52) >  STRESS TEST IMPRESSIONS:  Chest Pain: No chest pain with administration of  Regadenoson.  Symptom: No Symptom.  HR Response: Appropriate.  BP Response: Appropriate.  Heart Rhythm: Normal Sinus Rhythm - 63 BPM.  ECG Abnormalities: There were no diagnostic changes.  Arrhythmia: rare PVC.  < end of copied text >    PAST MEDICAL HISTORY  Emphysema  Hyperlipidemia  Incisional hernia  Obese  Diastolic dysfunction  MR (mitral regurgitation)  Abnormal stress test  CAD (coronary artery disease)  GERD (gastroesophageal reflux disease)  Cholelithiasis  Osteopenia after menopause    PAST SURGICAL HISTORY  S/P small bowel resection  H/O hernia repair  S/P cholecystectomy    SOCIAL HISTORY: Denies smoking/alcohol/drugs    FAMILY HISTORY:  Family history of cardiac disorder mother    Family History of Cardiovascular Disease:  Yes   Coronary Artery Disease in first degree relative: Yes  Sudden Cardiac Death in First degree relative: No     HOME MEDICATIONS:   Advair Diskus 250 mcg-50 mcg inhalation powder: 1 puff(s) inhaled 2 times a day (29 May 2019 11:50)  alendronate 35 mg oral tablet: 1 tab(s) orally once a week (29 May 2019 11:50)  aspirin 81 mg oral delayed release tablet: 1 tab(s) orally once a day (29 May 2019 11:50)  atorvastatin 20 mg oral tablet: 1 tab(s) orally once a day (at bedtime) (29 May 2019 11:50)  Metoprolol Succinate ER 25 mg oral tablet, extended release: 1 tab(s) orally once a day (29 May 2019 11:50)  omeprazole 20 mg oral delayed release capsule: 1 cap(s) orally once a day (29 May 2019 11:50)    CURRENT CARDIAC MEDICATIONS:     CURRENT OTHER MEDICATIONS:     ALLERGIES:   No Known Allergies    REVIEW OF SYMPTOMS:   CONSTITUTIONAL: No fever, no chills, no weight loss, no weight gain, no fatigue   ENMT:  No vertigo; No sinus or throat pain  NECK: No pain or stiffness  CARDIOVASCULAR: No chest pain, + MENCHACA, no syncope/presyncope, no fatigue, no palpitations, no dizziness, no Orthopnea, no Paroxsymal nocturnal dyspnea  RESPIRATORY: + cough, no wheezing  : No dysuria, no hematuria   GI: no nausea, no diarrhea, no constipation, no abdominal pain,  NEURO: No headache, no slurred speech   MUSCULOSKELETAL: No joint pain or swelling; No muscle, back, or extremity pain  PSYCH: No agitation, no anxiety.    ALL OTHER REVIEW OF SYSTEMS ARE NEGATIVE.    VITAL SIGNS:   T(C): 36.6 (02-09-23 @ 00:02), Max: 36.7 (02-08-23 @ 16:55)  T(F): 97.9 (02-09-23 @ 00:02), Max: 98.1 (02-08-23 @ 16:55)  HR: 96 (02-09-23 @ 00:02) (71 - 96)  BP: 115/67 (02-09-23 @ 00:02) (115/67 - 162/78)  RR: 18 (02-09-23 @ 00:02) (18 - 18)  SpO2: 94% (02-09-23 @ 00:02) (94% - 96%)     PHYSICAL EXAM:   Constitutional: Comfortable . No acute distress.   HEENT: Atraumatic and normocephalic , neck is supple . no JVD.   CNS: A&Ox3. No focal deficits.   Respiratory: unlabored. No wheezing, No rhonchi. + crackles b/l and + decreased lung sounds b/l   Cardiovascular: RRR normal s1 s2. No murmur. No rubs or gallop.  Gastrointestinal: Soft, non-tender. +Bowel sounds.   Extremities: 2+ Peripheral Pulses, + 1 non-pitting edema LE b/l  Psychiatric: Calm . no agitation.   Skin: Warm and dry    LABS:   ( 08 Feb 2023 17:03 )  Troponin T  <0.01,  CPK  X    , CKMB  X    ,                           12.4   8.19  )-----------( 313      ( 08 Feb 2023 17:03 )             38.6     02-08    137  |  102  |  19.6  ----------------------------<  146<H>  4.5   |  26.0  |  0.91    Ca    9.2      08 Feb 2023 17:03  Mg     2.3     02-08    TPro  7.2  /  Alb  3.8  /  TBili  0.5  /  DBili  x   /  AST  21  /  ALT  17  /  AlkPhos  102  02-08    D-Dimer Assay, Quantitative: 405:     ECG: Sinus rhythm. Poor quality. Repeat  Prior ECG: Yes     RADIOLOGY & ADDITIONAL STUDIES:     Xray Chest 1 View- PORTABLE-Urgent (02.08.23 @ 19:09) >  IMPRESSION:   RIGHT lower zone diffuse airspace consolidation..  < end of copied text >    CT Angio Chest PE Protocol w/ IV Cont (02.08.23 @ 22:34) >  IMPRESSION:  No pulmonary embolism.  Mild emphysema. Dependent atelectatic changes. Airspace and reticular   opacities in the right lung primarily the right middle lobe and lower   lobe which is suspicious for pneumonia. Correlate clinically and follow   to resolution.  There is a spiculated nodule in the left upper lobe measuring 1.4 cm   (5:29). This could represent malignancy. Pulmonary evaluation is   recommended.  < end of copied text >    Preliminary evaluation, please await official recommendations     Assessment and recommendations are final when note is signed by the attending.

## 2023-02-09 NOTE — PROGRESS NOTE ADULT - SUBJECTIVE AND OBJECTIVE BOX
Post Midnight Follow up    82 year old female with PMH COPD, Non-Obstructive CAD presented with worsening dyspnea, cough and hypoxia.    Admitted for RLL Pneumonia and associated COPD exacerbation earlier today.    Patient seen, examined at bedside ~ noon  Feels better; dyspnea improved.   Afebrile  No wheezing at time of examination    normal WBC  CT with emphysema RLL infiltrate    A/P as outlined in H&P earlier with     - Decrease Solu-Medrol 40mg BID  - Check RVP  - SLP for aspiration evaluation  - Pulmonology consult pending

## 2023-02-09 NOTE — PATIENT PROFILE ADULT - FALL HARM RISK - HARM RISK INTERVENTIONS

## 2023-02-09 NOTE — H&P ADULT - NSHPLABSRESULTS_GEN_ALL_CORE
12.4   8.19  )-----------( 313      ( 08 Feb 2023 17:03 )             38.6       02-08    137  |  102  |  19.6  ----------------------------<  146<H>  4.5   |  26.0  |  0.91    Ca    9.2      08 Feb 2023 17:03  Mg     2.3     02-08    TPro  7.2  /  Alb  3.8  /  TBili  0.5  /  DBili  x   /  AST  21  /  ALT  17  /  AlkPhos  102  02-08 12.4   8.19  )-----------( 313      ( 08 Feb 2023 17:03 )             38.6       02-08    137  |  102  |  19.6  ----------------------------<  146<H>  4.5   |  26.0  |  0.91    Ca    9.2      08 Feb 2023 17:03  Mg     2.3     02-08    TPro  7.2  /  Alb  3.8  /  TBili  0.5  /  DBili  x   /  AST  21  /  ALT  17  /  AlkPhos  102  02-08    CXR: RIGHT lower zone diffuse airspace consolidation

## 2023-02-09 NOTE — H&P ADULT - HISTORY OF PRESENT ILLNESS
82yoF hx former heavy smoker, non-obstructive CAD, COPD not on home O2, HLD, presenting with several days of increased baseline exertional dyspnea associated with worsening productive cough, generalized weakness and malaise.  Pt reports chronic unchanged bilateral leg edema which she attributes to venous insufficiency.  She denies any fevers, chills, chest pain.  Pt went to her outpatient cardiologist office regarding her above symptoms, was found to be hypoxic satting 84% on room air, was placed on 3L nasal cannula and sent to hospital.  On admission, pt maintained on nasal cannula and had CTA that was negative for PE but showed R-sided opacities concerning for PNA and incidental finding of spiculated DG nodule.

## 2023-02-09 NOTE — CONSULT NOTE ADULT - NS ATTEND AMEND GEN_ALL_CORE FT
MENCHACA:  Pt with hx of mild CAD and COPD. Sent by cardiologist 2/2 hypoxia and MENCHACA. Pt with productive cough and runny nose since January. Now co MENCHACA since last Thursday. In the ED pt was sat 86% on RA at rest with improvement to 93 % with 3L NC.  CTA chest shows Mild emphysema. Dependent atelectatic changes. Airspace and reticular opacities in the right lung primarily the right middle lobe and lower lobe which is suspicious for pneumonia.   CTA also shows  a spiculated nodule in the left upper lobe measuring 1.4 cm. This could represent malignancy.   Pulmonology consult. Further ZAMORA as per PMT.  Chronic Diastolic congestive heart failure:  Euvolemic on exam. pBNP 135  Venous Doppler lower extremities r/o DVT. Pt with hx of mild CAD and COPD. (TTE 2021 LVEF 45-50.  DD 1.; EC2023 Sinus Rhythm, nonspecific st/t abnormality consistent with prior EKGs; Stress Test: 2019, Moderate ischemic in RCA territory. ; Cardiac Cath/PCI: 2019 , non obstructive coronary artery disease ; RCA : 20% stenosis. )    Pt was went by cardiologist 2/2 hypoxia and MENCHACA. Pt with productive cough and runny nose since January. Now co MENCHACA since last Thursday. In the ED pt was sat 86% on RA at rest with improvement to 93 % with 3L NC.    CTA chest shows Mild emphysema. Dependent atelectatic changes. Airspace and reticular opacities in the right lung primarily the right middle lobe and lower lobe which is suspicious for pneumonia.   CTA also shows  a spiculated nodule in the left upper lobe measuring 1.4 cm. This could represent malignancy.   Pulmonology consult. Further ZAMORA as per PMT.  Chronic Diastolic congestive heart failure:  Euvolemic on exam. pBNP 135  Venous Doppler lower extremities r/o DVT.

## 2023-02-09 NOTE — CONSULT NOTE ADULT - PROBLEM SELECTOR RECOMMENDATION 9
Pt with hx of mild CAD and COPD. Sent by cardiologist 2/2 hypoxia and MENCHACA. Pt with productive cough and runny nose since January. Now co MENCHACA since last Thursday. In the ED pt was sat 86% on RA at rest with improvement to 93 % with 3L NC.  Trop x 1 negative   EKG shows   CTA chest shows Mild emphysema. Dependent atelectatic changes. Airspace and reticular opacities in the right lung primarily the right middle lobe and lower lobe which is suspicious for pneumonia.   Pt is now tx with ABx x PNA  CTA also shows  a spiculated nodule in the left upper lobe measuring 1.4 cm (5:29). This could represent malignancy.   Pulmonology consult   Telemonitor  O2 NC PRN  Trend CE. Trend trops x 3 q6. Serial EKGs  A1C, lipid panel fasting, TFTs, sed rate to ro comorbidities   Echo to assess structural and functional status  Maintain K+~4 and mag~2  DASH diet  Encourage daily exercise as tolerated and optimal weight management Pt with hx of mild CAD and COPD. Sent by cardiologist 2/2 hypoxia and MENCHACA. Pt with productive cough and runny nose since January. Now co MENCHACA since last Thursday. In the ED pt was sat 86% on RA at rest with improvement to 93 % with 3L NC.  Trop x 1 negative   EKG shows Sinus rhythm. Poor quality. Repeat  CTA chest shows Mild emphysema. Dependent atelectatic changes. Airspace and reticular opacities in the right lung primarily the right middle lobe and lower lobe which is suspicious for pneumonia.   Pt is now tx with ABx x PNA  CTA also shows  a spiculated nodule in the left upper lobe measuring 1.4 cm (5:29). This could represent malignancy.   Pulmonology consult   Telemonitor  O2 NC PRN  Trend CE. Trend trops x 3 q6. Serial EKGs  A1C, lipid panel fasting, TFTs, sed rate to ro comorbidities   Echo to assess structural and functional status  Maintain K+~4 and mag~2  DASH diet  Encourage daily exercise as tolerated and optimal weight management

## 2023-02-09 NOTE — H&P ADULT - ASSESSMENT
82yoF hx former heavy smoker, non-obstructive CAD, COPD not on home O2, HLD, presenting with several days of increased baseline exertional dyspnea associated with worsening productive cough, generalized weakness and malaise    Acute on chronic hypoxemic respiratory failure due to PNA and COPD exacerbation  -O2 84% on room air as outpatient, arrived to hospital on 3L NC  -CT chest showing R-sided opacities concerning for PNA  -Received azithromycin, ceftriaxone, solumedrol by ED  -Continue above abx for CAP treatment   -Continue IV solumedrol 40mg q8hr   -Continue NC for now  -Wean supplemental O2 as tolerated  -Seen by cardiology PA overnight, note reviewed, follow up attending addendum in AM    Spiculated lung nodule  -Incidental finding spiculated DG nodule measuring 1.4 cm on CT chest  -Heavy smoking history, high risk for malignant etiology  -Pt informed of result, possibility of malignant process given smoking hx  -Pulmonary consult re: further diagnostic testing, ?inpatient biopsy     Hx CAD/HLD  -ASA 81mg daily  -Atorvastatin 20mg daily   -B-blocker on hold due to presumed COPD exacerbation     Hx HTN  -Hold metoprolol 25mg daily due to COPD exacerbation     Prophylactic measure  -Lovenox 40mg daily  82yoF hx former heavy smoker, non-obstructive CAD, COPD not on home O2, HLD, presenting with several days of increased baseline exertional dyspnea associated with worsening productive cough, generalized weakness and malaise    Acute hypoxemic respiratory failure due to PNA and COPD exacerbation  -O2 84% on room air as outpatient, arrived to hospital on 3L NC  -CT chest showing R-sided opacities concerning for PNA  -Received azithromycin, ceftriaxone, solumedrol by ED  -Continue above abx for CAP treatment   -Continue IV solumedrol 40mg q8hr   -Continue NC for now  -Wean supplemental O2 as tolerated  -Seen by cardiology PA overnight, note reviewed, follow up attending addendum in AM    Spiculated lung nodule  -Incidental finding spiculated DG nodule measuring 1.4 cm on CT chest  -Heavy smoking history, high risk for malignant etiology  -Pt informed of result, possibility of malignant process given smoking hx  -Pulmonary consult re: further diagnostic testing, ?inpatient biopsy     Hx CAD/HLD  -ASA 81mg daily  -Atorvastatin 20mg daily   -B-blocker on hold due to presumed COPD exacerbation     Hx HTN  -Hold metoprolol 25mg daily due to COPD exacerbation     Prophylactic measure  -Lovenox 40mg daily  82yoF hx former heavy smoker, non-obstructive CAD, COPD not on home O2, HLD, presenting with several days of increased baseline exertional dyspnea associated with worsening productive cough, generalized weakness and malaise    Acute hypoxemic respiratory failure due to PNA and COPD exacerbation  -O2 84% on room air as outpatient, arrived to hospital on 3L NC  -CT chest showing R-sided opacities concerning for PNA  -Received azithromycin, ceftriaxone, solumedrol by ED  -Continue above abx for CAP treatment   -Continue IV solumedrol 40mg q8hr   -Interchange maintenance inhaler with Symbicort   -Continue NC for now  -Wean supplemental O2 as tolerated  -Seen by cardiology PA overnight, note reviewed, follow up attending addendum in AM    Spiculated lung nodule  -Incidental finding spiculated DG nodule measuring 1.4 cm on CT chest  -Heavy smoking history, high risk for malignant etiology  -Pt informed of result, possibility of malignant process given smoking hx  -Pulmonary consult re: further diagnostic testing, ?inpatient biopsy     Hx CAD/HLD  -ASA 81mg daily  -Atorvastatin 20mg daily   -B-blocker on hold due to presumed COPD exacerbation     Hx HTN  -Hold metoprolol 25mg daily due to COPD exacerbation     Prophylactic measure  -Lovenox 40mg daily

## 2023-02-09 NOTE — PATIENT PROFILE ADULT - FUNCTIONAL ASSESSMENT - DAILY ACTIVITY 5.
4 = No assist / stand by assistance Wartpeel Counseling:  I discussed with the patient the risks of Wartpeel including but not limited to erythema, scaling, itching, weeping, crusting, and pain.

## 2023-02-09 NOTE — PATIENT PROFILE ADULT - NSPROEXTENSIONSOFSELF_GEN_A_NUR
Routing refill request to provider for review/approval because:  Drug not on the [facility] refill protocol     zolpidem (AMBIEN) 10 MG tablet  Last Written Prescription Date:  8/21/20  Last Fill Quantity: 30,  # refills: 0   Last office visit: 8/26/2020 with prescribing provider:  armen Denson   Future Office Visit:   Next 5 appointments (look out 90 days)    Sep 21, 2020  2:20 PM CDT  MyChart Physical Adult with Armen Denson PA-C  [facility] [place] ([facility]) [id number]  [place] [address]  [phone]                              none

## 2023-02-09 NOTE — CONSULT NOTE ADULT - PROBLEM SELECTOR RECOMMENDATION 3
Lipid panel fasting  Continue Statins. Monitor LFTs  DASH diet      Further recommendations base on above findings Lipid panel fasting  Continue Statins. Monitor LFTs  DASH diet      Further recommendations base on above findings  Case discussed with Dr Delgado

## 2023-02-10 DIAGNOSIS — Z87.891 PERSONAL HISTORY OF NICOTINE DEPENDENCE: ICD-10-CM

## 2023-02-10 DIAGNOSIS — J18.9 PNEUMONIA, UNSPECIFIED ORGANISM: ICD-10-CM

## 2023-02-10 DIAGNOSIS — J44.1 CHRONIC OBSTRUCTIVE PULMONARY DISEASE WITH (ACUTE) EXACERBATION: ICD-10-CM

## 2023-02-10 DIAGNOSIS — J96.01 ACUTE RESPIRATORY FAILURE WITH HYPOXIA: ICD-10-CM

## 2023-02-10 DIAGNOSIS — J43.9 EMPHYSEMA, UNSPECIFIED: ICD-10-CM

## 2023-02-10 DIAGNOSIS — R91.1 SOLITARY PULMONARY NODULE: ICD-10-CM

## 2023-02-10 LAB
ANION GAP SERPL CALC-SCNC: 10 MMOL/L — SIGNIFICANT CHANGE UP (ref 5–17)
BUN SERPL-MCNC: 25.3 MG/DL — HIGH (ref 8–20)
CALCIUM SERPL-MCNC: 9.1 MG/DL — SIGNIFICANT CHANGE UP (ref 8.4–10.5)
CHLORIDE SERPL-SCNC: 103 MMOL/L — SIGNIFICANT CHANGE UP (ref 96–108)
CO2 SERPL-SCNC: 26 MMOL/L — SIGNIFICANT CHANGE UP (ref 22–29)
CREAT SERPL-MCNC: 0.9 MG/DL — SIGNIFICANT CHANGE UP (ref 0.5–1.3)
EGFR: 64 ML/MIN/1.73M2 — SIGNIFICANT CHANGE UP
GLUCOSE SERPL-MCNC: 151 MG/DL — HIGH (ref 70–99)
HCT VFR BLD CALC: 35.1 % — SIGNIFICANT CHANGE UP (ref 34.5–45)
HGB BLD-MCNC: 11.2 G/DL — LOW (ref 11.5–15.5)
MCHC RBC-ENTMCNC: 30.4 PG — SIGNIFICANT CHANGE UP (ref 27–34)
MCHC RBC-ENTMCNC: 31.9 GM/DL — LOW (ref 32–36)
MCV RBC AUTO: 95.1 FL — SIGNIFICANT CHANGE UP (ref 80–100)
PLATELET # BLD AUTO: 355 K/UL — SIGNIFICANT CHANGE UP (ref 150–400)
POTASSIUM SERPL-MCNC: 4.3 MMOL/L — SIGNIFICANT CHANGE UP (ref 3.5–5.3)
POTASSIUM SERPL-SCNC: 4.3 MMOL/L — SIGNIFICANT CHANGE UP (ref 3.5–5.3)
RBC # BLD: 3.69 M/UL — LOW (ref 3.8–5.2)
RBC # FLD: 13 % — SIGNIFICANT CHANGE UP (ref 10.3–14.5)
SODIUM SERPL-SCNC: 139 MMOL/L — SIGNIFICANT CHANGE UP (ref 135–145)
WBC # BLD: 14.01 K/UL — HIGH (ref 3.8–10.5)
WBC # FLD AUTO: 14.01 K/UL — HIGH (ref 3.8–10.5)

## 2023-02-10 PROCEDURE — 99223 1ST HOSP IP/OBS HIGH 75: CPT

## 2023-02-10 PROCEDURE — 93306 TTE W/DOPPLER COMPLETE: CPT | Mod: 26

## 2023-02-10 PROCEDURE — 99233 SBSQ HOSP IP/OBS HIGH 50: CPT

## 2023-02-10 PROCEDURE — 99232 SBSQ HOSP IP/OBS MODERATE 35: CPT | Mod: 25

## 2023-02-10 RX ADMIN — PANTOPRAZOLE SODIUM 40 MILLIGRAM(S): 20 TABLET, DELAYED RELEASE ORAL at 05:49

## 2023-02-10 RX ADMIN — BUDESONIDE AND FORMOTEROL FUMARATE DIHYDRATE 2 PUFF(S): 160; 4.5 AEROSOL RESPIRATORY (INHALATION) at 09:01

## 2023-02-10 RX ADMIN — Medication 1000 UNIT(S): at 12:20

## 2023-02-10 RX ADMIN — ATORVASTATIN CALCIUM 20 MILLIGRAM(S): 80 TABLET, FILM COATED ORAL at 21:53

## 2023-02-10 RX ADMIN — Medication 81 MILLIGRAM(S): at 12:20

## 2023-02-10 RX ADMIN — AZITHROMYCIN 255 MILLIGRAM(S): 500 TABLET, FILM COATED ORAL at 05:49

## 2023-02-10 RX ADMIN — Medication 40 MILLIGRAM(S): at 05:49

## 2023-02-10 RX ADMIN — CEFTRIAXONE 1000 MILLIGRAM(S): 500 INJECTION, POWDER, FOR SOLUTION INTRAMUSCULAR; INTRAVENOUS at 05:49

## 2023-02-10 RX ADMIN — Medication 3 MILLILITER(S): at 09:00

## 2023-02-10 RX ADMIN — Medication 3 MILLILITER(S): at 14:52

## 2023-02-10 RX ADMIN — ENOXAPARIN SODIUM 40 MILLIGRAM(S): 100 INJECTION SUBCUTANEOUS at 05:49

## 2023-02-10 NOTE — PROGRESS NOTE ADULT - NS ATTEND AMEND GEN_ALL_CORE FT
Patient seen and examined by me.  Chaperone:  Radha Becker NP    T(C): 36.5 (02-10-23 @ 16:50), Max: 36.7 (02-09-23 @ 19:03)  HR: 82 (02-10-23 @ 16:50) (78 - 95)  BP: 131/66 (02-10-23 @ 16:50) (130/64 - 145/66)  RR: 19 (02-10-23 @ 16:50) (17 - 19)  SpO2: 94% (02-10-23 @ 16:50) (83% - 100%)  Patient alert and awake.  Chest- Bilateral Clear BS  Cardiac- S1 and S2  Abdomen- Soft    Assessment:  1. Nonobstructive CAD  2. COPD    Recommendations:  Patients echo is Pending  Patient is low to moderate risk for perioperative cardiac events from the procedure related to lung mass.  NO ABSOLUTE CONTRAINDICATIONS TO PROCEED WITH THE THORACIC SURGICAL PROCEDURES  PLEASE CHECK ECHO RESULTS    Patient is on Optimal medical therapy.      acetaminophen     Tablet .. 650 milliGRAM(s) Oral every 6 hours PRN  albuterol/ipratropium for Nebulization 3 milliLiter(s) Nebulizer every 6 hours  aspirin enteric coated 81 milliGRAM(s) Oral daily  atorvastatin 20 milliGRAM(s) Oral at bedtime  azithromycin  IVPB 500 milliGRAM(s) IV Intermittent every 24 hours  benzonatate 100 milliGRAM(s) Oral every 8 hours PRN  budesonide 160 MICROgram(s)/formoterol 4.5 MICROgram(s) Inhaler 2 Puff(s) Inhalation two times a day  cefTRIAXone Injectable. 1000 milliGRAM(s) IV Push every 24 hours  cholecalciferol 1000 Unit(s) Oral daily  enoxaparin Injectable 40 milliGRAM(s) SubCutaneous every 24 hours  guaiFENesin Oral Liquid (Sugar-Free) 100 milliGRAM(s) Oral every 6 hours PRN  pantoprazole    Tablet 40 milliGRAM(s) Oral before breakfast  predniSONE   Tablet 50 milliGRAM(s) Oral daily      I have discussed my recommendation with the PA which are outlined above.  Will Sign off

## 2023-02-10 NOTE — CONSULT NOTE ADULT - ASSESSMENT
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
Patient is a 82y old  Female who presents with a chief complaint of MENCHACA since last Thursday.

## 2023-02-10 NOTE — PHYSICAL THERAPY INITIAL EVALUATION ADULT - PERTINENT HX OF CURRENT PROBLEM, REHAB EVAL
82yoF hx former heavy smoker, non-obstructive CAD, COPD not on home O2, HLD, presenting with several days of increased baseline exertional dyspnea associated with worsening productive cough, generalized weakness and malaise. Patient had ct angio chest: neg for PE and found to a pneumonia and a spiculated nodule. Pulm consulted.

## 2023-02-10 NOTE — PROGRESS NOTE ADULT - SUBJECTIVE AND OBJECTIVE BOX
[facility]                                                         CARDIOLOGY AT [facility]                                                                  [address], [place] [address]                                                         Telephone: [phone]. Fax:[phone]                                                                             PROGRESS NOTE    Reason for follow up: Shortness of breath  Update: states her sob has improved  Echo is pending    Review of symptoms:   Cardiac:  No chest pain. No dyspnea. No palpitations.  Respiratory: no cough. No dyspnea  Gastrointestinal: No diarrhea. No abdominal pain. No bleeding.   Neuro: No focal neuro complaints.      Vitals:  T(C): 36.4 (02-10-23 @ 04:35), Max: 36.7 (02-09-23 @ 19:03)  HR: 87 (02-10-23 @ 09:03) (78 - 96)  BP: 144/71 (02-10-23 @ 04:35) (129/66 - 145/66)  RR: 18 (02-10-23 @ 04:35) (17 - 18)  SpO2: 94% (02-10-23 @ 09:03) (83% - 95%)  Wt(kg): --  I&O's Summary    Weight (kg): 77.1 (02-08 @ 16:55)      PHYSICAL EXAM:  Appearance: Comfortable. No acute distress  HEENT:  Atraumatic. Normocephalic.  Normal oral mucosa  Neurologic: A & O x 3, no gross focal deficits.  Cardiovascular: RRR S1 S2, regular  Respiratory: Lungs clear to auscultation, unlabored   Gastrointestinal:  Soft, Non-tender, + BS  Lower Extremities: chronic edema  Psychiatry: Patient is calm. No agitation.   Skin: warm and dry.      CURRENT MEDICATIONS:  MEDICATIONS  (STANDING):  albuterol/ipratropium for Nebulization 3 milliLiter(s) Nebulizer every 6 hours  aspirin enteric coated 81 milliGRAM(s) Oral daily  atorvastatin 20 milliGRAM(s) Oral at bedtime  azithromycin  IVPB 500 milliGRAM(s) IV Intermittent every 24 hours  budesonide 160 MICROgram(s)/formoterol 4.5 MICROgram(s) Inhaler 2 Puff(s) Inhalation two times a day  cefTRIAXone Injectable. 1000 milliGRAM(s) IV Push every 24 hours  cholecalciferol 1000 Unit(s) Oral daily  enoxaparin Injectable 40 milliGRAM(s) SubCutaneous every 24 hours  pantoprazole    Tablet 40 milliGRAM(s) Oral before breakfast  predniSONE   Tablet 50 milliGRAM(s) Oral daily          LABS:	 	  CARDIAC MARKERS ( 08 Feb 2023 17:03 )  x     / <0.01 ng/mL / x     / x     / x      p-BNP 08 Feb 2023 17:03: 135 pg/mL                          11.2   14.01 )-----------( 355      ( 10 Feb 2023 05:49 )             35.1     02-10    139  |  103  |  25.3<H>  ----------------------------<  151<H>  4.3   |  26.0  |  0.90    Ca    9.1      10 Feb 2023 05:49  Mg     2.3     02-08    TPro  7.2  /  Alb  3.8  /  TBili  0.5  /  DBili  x   /  AST  21  /  ALT  17  /  AlkPhos  102  02-08    proBNP: Serum Pro-Brain Natriuretic Peptide: 135 pg/mL (02-08 @ 17:03)    Lipid Profile:   HgA1c:   < from: CT Angio Chest PE Protocol w/ IV Cont (02.08.23 @ 22:34) >  No pulmonary embolism.    Mild emphysema. Dependent atelectatic changes. Airspace and reticular   opacities in the right lung primarily the right middle lobe and lower   lobe which is suspicious for pneumonia. Correlate clinically and follow   to resolution.    There is a spiculated nodule in the left upper lobe measuring 1.4 cm   (5:29). This could represent malignancy. Pulmonary evaluation is   recommended.      < end of copied text >  TSH:       TELEMETRY: No monitor  ECG:  	      DIAGNOSTIC TESTING:  [ ] Echocardiogram:  PENDING  [ ]  Catheterization:  [ ] Stress Test:    OTHER: 	                                                                [facility]                                                         CARDIOLOGY AT [facility]                                                                  [address], [place] [address]                                                         Telephone: [phone]. Fax:[phone]                                                                             PROGRESS NOTE    Reason for follow up: Shortness of breath  Update: states her sob has improved  Echo is pending    Review of symptoms:   Cardiac:  No chest pain. No dyspnea. No palpitations.  Respiratory: no cough. No dyspnea  Gastrointestinal: No diarrhea. No abdominal pain. No bleeding.   Neuro: No focal neuro complaints.      Vitals:  T(C): 36.4 (02-10-23 @ 04:35), Max: 36.7 (02-09-23 @ 19:03)  HR: 87 (02-10-23 @ 09:03) (78 - 96)  BP: 144/71 (02-10-23 @ 04:35) (129/66 - 145/66)  RR: 18 (02-10-23 @ 04:35) (17 - 18)  SpO2: 94% (02-10-23 @ 09:03) (83% - 95%)  Wt(kg): --  I&O's Summary    Weight (kg): 77.1 (02-08 @ 16:55)      PHYSICAL EXAM:  Appearance: Comfortable. No acute distress  HEENT:  Atraumatic. Normocephalic.  Normal oral mucosa  Neurologic: A & O x 3, no gross focal deficits.  Cardiovascular: RRR S1 S2, regular  Respiratory: Lungs clear to auscultation, unlabored   Gastrointestinal:  Soft, Non-tender, + BS  Lower Extremities: chronic edema  Psychiatry: Patient is calm. No agitation.   Skin: warm and dry.      CURRENT MEDICATIONS:  MEDICATIONS  (STANDING):  albuterol/ipratropium for Nebulization 3 milliLiter(s) Nebulizer every 6 hours  aspirin enteric coated 81 milliGRAM(s) Oral daily  atorvastatin 20 milliGRAM(s) Oral at bedtime  azithromycin  IVPB 500 milliGRAM(s) IV Intermittent every 24 hours  budesonide 160 MICROgram(s)/formoterol 4.5 MICROgram(s) Inhaler 2 Puff(s) Inhalation two times a day  cefTRIAXone Injectable. 1000 milliGRAM(s) IV Push every 24 hours  cholecalciferol 1000 Unit(s) Oral daily  enoxaparin Injectable 40 milliGRAM(s) SubCutaneous every 24 hours  pantoprazole    Tablet 40 milliGRAM(s) Oral before breakfast  predniSONE   Tablet 50 milliGRAM(s) Oral daily          LABS:	 	  CARDIAC MARKERS ( 08 Feb 2023 17:03 )  x     / <0.01 ng/mL / x     / x     / x      p-BNP 08 Feb 2023 17:03: 135 pg/mL                          11.2   14.01 )-----------( 355      ( 10 Feb 2023 05:49 )             35.1     02-10    139  |  103  |  25.3<H>  ----------------------------<  151<H>  4.3   |  26.0  |  0.90    Ca    9.1      10 Feb 2023 05:49  Mg     2.3     02-08    TPro  7.2  /  Alb  3.8  /  TBili  0.5  /  DBili  x   /  AST  21  /  ALT  17  /  AlkPhos  102  02-08    proBNP: Serum Pro-Brain Natriuretic Peptide: 135 pg/mL (02-08 @ 17:03)    Lipid Profile:   HgA1c:   < from: CT Angio Chest PE Protocol w/ IV Cont (02.08.23 @ 22:34) >  No pulmonary embolism.    Mild emphysema. Dependent atelectatic changes. Airspace and reticular   opacities in the right lung primarily the right middle lobe and lower   lobe which is suspicious for pneumonia. Correlate clinically and follow   to resolution.    There is a spiculated nodule in the left upper lobe measuring 1.4 cm   (5:29). This could represent malignancy. Pulmonary evaluation is   recommended.      < end of copied text >  TSH:       TELEMETRY: No monitor  ECG:  	      DIAGNOSTIC TESTING:  [ ] Echocardiogram:  PENDING  [ ]  Catheterization:  [ ] Stress Test:    OTHER: 	  < from: Cardiac Cath Lab - Adult (05.29.19 @ 13:51) >  CORONARY VESSELS: The coronary circulation is right dominant.  LM:   --  LM: Normal.  LAD:   --  LAD: Normal.  CX:   --  Circumflex: Normal.  RCA:   --  Proximal RCA: There was a 20 % stenosis.  --  Mid RCA: There was a 20 % stenosis.  COMPLICATIONS: No complications occurred during the cath lab visit.  DIAGNOSTIC IMPRESSIONS: Mild CAD.  DIAGNOSTIC RECOMMENDATIONS: Unclear etiology for wall motion on echo.  Medical management of mild CAD.  Prepared and signed by  Saad Huber MD  Signed 05/29/2019 20:55:02    < end of copied text >

## 2023-02-10 NOTE — PROGRESS NOTE ADULT - PROBLEM SELECTOR PLAN 2
Low na diet  Radiography no clinical evidence of chf  will await echo      Cardiac meds  aspirin enteric coated 81 milliGRAM(s) Oral daily  atorvastatin 20 milliGRAM(s) Oral at bedtime

## 2023-02-10 NOTE — PHYSICAL THERAPY INITIAL EVALUATION ADULT - LEVEL OF INDEPENDENCE: STAIR NEGOTIATION, REHAB EVAL
pt with spo2 to 82% after amb on level surfaces. stair assessment held at this time./unable to perform

## 2023-02-10 NOTE — SWALLOW BEDSIDE ASSESSMENT ADULT - SWALLOW EVAL: DIAGNOSIS
Jovanna-pharyngeal swallow clinically judged to be WFL. No overt s/s of penetration/aspiration observed @ the bedside

## 2023-02-10 NOTE — CONSULT NOTE ADULT - SUBJECTIVE AND OBJECTIVE BOX
PULMONARY CONSULT NOTE      GIANFRANCO VYAS  MRN-[id number]    Patient is a 82y old  Female who presents with a chief complaint of Acute on chronic hypoxemic respiratory failure due to PNA, COPD exacerbation (10 Feb 2023 11:26)      HISTORY OF PRESENT ILLNESS:    82yoF hx former heavy smoker, non-obstructive CAD, COPD not on home O2, HLD, presenting with several days of increased baseline exertional dyspnea associated with worsening productive cough, generalized weakness and malaise.  Pt reports chronic unchanged bilateral leg edema which she attributes to venous insufficiency.  She denies any fevers, chills, chest pain.  Pt went to her outpatient cardiologist office regarding her above symptoms, was found to be hypoxic satting 84% on room air, was placed on 3L nasal cannula and sent to hospital.  On admission, pt maintained on nasal cannula and had CTA that was negative for PE but showed R-sided opacities concerning for PNA and incidental finding of spiculated DG nodule.   Former smoker of up to 2 ppd x 50+ years, quit >10 years ago.  Seen by Dr. Baxter in 6/2019 but not compliant with f/u since. PFTs at that time with FVC of 1.8L (68%), FEV1 of 0.86L (43%) with an obstructive pattern but normal lung volumes and mod to severely reduced DLCO. She reports she is maintained on Wixela 250 but did not tolerate Spiriva in the past.      MEDICATIONS  (STANDING):  albuterol/ipratropium for Nebulization 3 milliLiter(s) Nebulizer every 6 hours  aspirin enteric coated 81 milliGRAM(s) Oral daily  atorvastatin 20 milliGRAM(s) Oral at bedtime  azithromycin  IVPB 500 milliGRAM(s) IV Intermittent every 24 hours  budesonide 160 MICROgram(s)/formoterol 4.5 MICROgram(s) Inhaler 2 Puff(s) Inhalation two times a day  cefTRIAXone Injectable. 1000 milliGRAM(s) IV Push every 24 hours  cholecalciferol 1000 Unit(s) Oral daily  enoxaparin Injectable 40 milliGRAM(s) SubCutaneous every 24 hours  pantoprazole    Tablet 40 milliGRAM(s) Oral before breakfast  predniSONE   Tablet 50 milliGRAM(s) Oral daily      MEDICATIONS  (PRN):  acetaminophen     Tablet .. 650 milliGRAM(s) Oral every 6 hours PRN Temp greater or equal to 38C (100.4F), Mild Pain (1 - 3), Moderate Pain (4 - 6)  benzonatate 100 milliGRAM(s) Oral every 8 hours PRN Cough  guaiFENesin Oral Liquid (Sugar-Free) 100 milliGRAM(s) Oral every 6 hours PRN Cough      Allergies    No Known Allergies    Intolerances        PAST MEDICAL & SURGICAL HISTORY:  Emphysema      Incisional hernia      Obese      MR (mitral regurgitation)  mild      HLD (hyperlipidemia)      Abnormal stress test  11-7-2017, cath done 11/21/17 was negitive      CAD (coronary artery disease)      GERD (gastroesophageal reflux disease)      Cholelithiasis  newly diagnosed.      Osteopenia after menopause      Abnormal stress echo  5/2/2019 suggestive of CAD      S/P small bowel resection  2003      H/O hernia repair  2012      S/P cholecystectomy  August 15 2018          FAMILY HISTORY:  Family history of cardiac disorder        SOCIAL HISTORY  Smoking History: Former smoker as above    REVIEW OF SYSTEMS:    CONSTITUTIONAL:  No fevers, chills, sweats    HEENT:  Eyes:  No diplopia or blurred vision. ENT:  No earache, sore throat or runny nose.    CARDIOVASCULAR:  No pressure, squeezing, tightness, or heaviness about the chest; no palpitations.    RESPIRATORY:  Per HPI    GASTROINTESTINAL:  No abdominal pain, nausea, vomiting or diarrhea.    GENITOURINARY:  No dysuria, frequency or urgency.    NEUROLOGIC:  No paresthesias, fasciculations, seizures or weakness.    PSYCHIATRIC:  No disorder of thought or mood.    Vital Signs Last 24 Hrs  T(C): 36.6 (10 Feb 2023 12:47), Max: 36.7 (09 Feb 2023 19:03)  T(F): 97.8 (10 Feb 2023 12:47), Max: 98.1 (09 Feb 2023 19:03)  HR: 82 (10 Feb 2023 14:53) (78 - 95)  BP: 130/64 (10 Feb 2023 12:47) (130/64 - 145/66)  BP(mean): --  RR: 18 (10 Feb 2023 12:47) (17 - 18)  SpO2: 100% (10 Feb 2023 14:53) (83% - 100%)    Parameters below as of 10 Feb 2023 14:53  Patient On (Oxygen Delivery Method): nasal cannula,2 lpm        PHYSICAL EXAMINATION:    GENERAL: The patient is a well-developed, well-nourished female in no apparent distress.     HEENT: Head is normocephalic and atraumatic. Extraocular muscles are intact. Mucous membranes are moist.     NECK: Supple.     LUNGS: Clear to auscultation without wheezing or rhonchi but mild rales. Respirations unlabored    HEART: Regular rate and rhythm without murmur.    ABDOMEN: Soft, nontender, and nondistended.  No hepatosplenomegaly is noted.    EXTREMITIES: Without any cyanosis, clubbing, rash, lesions or edema.    NEUROLOGIC: Grossly intact.      LABS:                        11.2   14.01 )-----------( 355      ( 10 Feb 2023 05:49 )             35.1     02-10    139  |  103  |  25.3<H>  ----------------------------<  151<H>  4.3   |  26.0  |  0.90    Ca    9.1      10 Feb 2023 05:49  Mg     2.3     02-08    TPro  7.2  /  Alb  3.8  /  TBili  0.5  /  DBili  x   /  AST  21  /  ALT  17  /  AlkPhos  102  02-08          CARDIAC MARKERS ( 08 Feb 2023 17:03 )  x     / <0.01 ng/mL / x     / x     / x            Serum Pro-Brain Natriuretic Peptide: 135 pg/mL (02-08-23 @ 17:03)          MICROBIOLOGY:    RADIOLOGY & ADDITIONAL STUDIES:    ACC: [id number] EXAM:  CT ANGIO CHEST PULM [facility]   ORDERED BY: SHANDRA SEGAL     PROCEDURE DATE:  02/08/2023          INTERPRETATION:  CLINICAL INFORMATION: Redness of breath, question   pulmonary embolism    COMPARISON: CT abdomen and pelvis 7/9/2018.    CONTRAST/COMPLICATIONS:  IV Contrast: Omnipaque 350  47 cc administered   53 cc discarded  Oral Contrast: NONE  Complications: None reported at time of study completion    PROCEDURE:  CT of the Chest was performed.  Sagittal and coronal reformats were performed.    FINDINGS:    LUNGS AND AIRWAYS: Patent central airways.  Mild emphysema. Dependent atelectatic changes. Airspace and reticular   opacities in the right lung primarily the right middle lobe and lower   lobe which is suspicious for pneumonia. Correlate clinically and follow   to resolution.  There are a few tiny scattered calcified granulomas. There is a   spiculated nodule in the left upper lobe measuring 1.4 cm (5:29). This   could represent malignancy. Pulmonary evaluationis recommended.  PLEURA: No pleural effusion.  MEDIASTINUM AND ASIF: No lymphadenopathy.  VESSELS: No pulmonary embolism.  HEART: Heart size is normal. No pericardial effusion.  CHEST WALL AND LOWER NECK: Within normal limits.  VISUALIZED UPPER ABDOMEN: Within normal limits.  BONES: Within normal limits.    IMPRESSION:    No pulmonary embolism.    Mild emphysema. Dependent atelectatic changes. Airspace and reticular   opacities in the right lung primarily the right middle lobe and lower   lobe which is suspicious for pneumonia. Correlate clinically and follow   to resolution.    There is a spiculated nodule in the left upper lobe measuring 1.4 cm   (5:29). This could represent malignancy. Pulmonary evaluation is   recommended.      AP GUTIERREZ MD; Attending Radiologist  This document has been electronically signed. Feb 8 2023 11:40PM

## 2023-02-10 NOTE — SWALLOW BEDSIDE ASSESSMENT ADULT - SWALLOW EVAL: RECOMMENDED FEEDING/EATING TECHNIQUES
allow for swallow between intakes/alternate food with liquid/maintain upright posture during/after eating for 30 mins/oral hygiene/position upright (90 degrees)

## 2023-02-10 NOTE — SWALLOW BEDSIDE ASSESSMENT ADULT - SLP PERTINENT HISTORY OF CURRENT PROBLEM
As per H&P: "82yoF hx former heavy smoker, non-obstructive CAD, COPD not on home O2, HLD, presenting with several days of increased baseline exertional dyspnea associated with worsening productive cough, generalized weakness and malaise.  Pt reports chronic unchanged bilateral leg edema which she attributes to venous insufficiency.  She denies any fevers, chills, chest pain.  Pt went to her outpatient cardiologist office regarding her above symptoms, was found to be hypoxic satting 84% on room air, was placed on 3L nasal cannula and sent to hospital.  On admission, pt maintained on nasal cannula and had CTA that was negative for PE but showed R-sided opacities concerning for PNA and incidental finding of spiculated DG nodule."

## 2023-02-10 NOTE — PHYSICAL THERAPY INITIAL EVALUATION ADULT - ADDITIONAL COMMENTS
pt states she lives with her daughter and son in law in a 2-story house with 2 platform steps to enter then stays on first floor. independent without devices prior to admit, +driving.

## 2023-02-10 NOTE — PROGRESS NOTE ADULT - SUBJECTIVE AND OBJECTIVE BOX
GIANFRANCO VYAS    [id number]    82y      Female    INTERVAL HPI/OVERNIGHT EVENTS: patient being seen for COPD exacerbation and pneumonia.     patient seen at bedside and states feeling ok. patient is on 2L nc.     REVIEW OF SYSTEMS:    CONSTITUTIONAL: No fever, weight loss, or fatigue  RESPIRATORY: No cough, wheezing, hemoptysis; No shortness of breath  CARDIOVASCULAR: No chest pain, palpitations  GASTROINTESTINAL: No abdominal or epigastric pain. No nausea, vomiting  NEUROLOGICAL: No headaches, memory loss, loss of strength.  MISCELLANEOUS:      Vital Signs Last 24 Hrs  T(C): 36.4 (10 Feb 2023 04:35), Max: 36.7 (09 Feb 2023 19:03)  T(F): 97.5 (10 Feb 2023 04:35), Max: 98.1 (09 Feb 2023 19:03)  HR: 87 (10 Feb 2023 09:03) (78 - 96)  BP: 144/71 (10 Feb 2023 04:35) (123/67 - 145/66)  BP(mean): --  RR: 18 (10 Feb 2023 04:35) (17 - 18)  SpO2: 94% (10 Feb 2023 09:03) (83% - 95%)    Parameters below as of 10 Feb 2023 09:03  Patient On (Oxygen Delivery Method): nasal cannula,2 lpm        PHYSICAL EXAM:      GENERAL:  Well-appearing elderly female, not in acute distress,  EYES:  Clear conjunctiva, extraocular movement intact  ENT: Moist mucous membranes  RESP:  minimal exp wheezing,   CV: Regular rate and rhythm, no murmurs appreciated, mild bilateral leg extremity edema  GI: Soft, non-tender, non-distended  NEURO: Awake, alert, conversant, upper and lower extremity strength 5/5, light touch sensation grossly intact  PSYCH: Calm, cooperative  SKIN: No rash or lesions, warm and dry    LABS:                        11.2   14.01 )-----------( 355      ( 10 Feb 2023 05:49 )             35.1     02-10    139  |  103  |  25.3<H>  ----------------------------<  151<H>  4.3   |  26.0  |  0.90    Ca    9.1      10 Feb 2023 05:49  Mg     2.3     02-08    TPro  7.2  /  Alb  3.8  /  TBili  0.5  /  DBili  x   /  AST  21  /  ALT  17  /  AlkPhos  102  02-08            MEDICATIONS  (STANDING):  albuterol/ipratropium for Nebulization 3 milliLiter(s) Nebulizer every 6 hours  aspirin enteric coated 81 milliGRAM(s) Oral daily  atorvastatin 20 milliGRAM(s) Oral at bedtime  azithromycin  IVPB 500 milliGRAM(s) IV Intermittent every 24 hours  budesonide 160 MICROgram(s)/formoterol 4.5 MICROgram(s) Inhaler 2 Puff(s) Inhalation two times a day  cefTRIAXone Injectable. 1000 milliGRAM(s) IV Push every 24 hours  cholecalciferol 1000 Unit(s) Oral daily  enoxaparin Injectable 40 milliGRAM(s) SubCutaneous every 24 hours  prednisone  pantoprazole    Tablet 40 milliGRAM(s) Oral before breakfast    MEDICATIONS  (PRN):  acetaminophen     Tablet .. 650 milliGRAM(s) Oral every 6 hours PRN Temp greater or equal to 38C (100.4F), Mild Pain (1 - 3), Moderate Pain (4 - 6)  benzonatate 100 milliGRAM(s) Oral every 8 hours PRN Cough  guaiFENesin Oral Liquid (Sugar-Free) 100 milliGRAM(s) Oral every 6 hours PRN Cough      RADIOLOGY & ADDITIONAL TESTS:

## 2023-02-11 LAB
ALBUMIN SERPL ELPH-MCNC: 3.4 G/DL — SIGNIFICANT CHANGE UP (ref 3.3–5.2)
ALP SERPL-CCNC: 80 U/L — SIGNIFICANT CHANGE UP (ref 40–120)
ALT FLD-CCNC: 19 U/L — SIGNIFICANT CHANGE UP
ANION GAP SERPL CALC-SCNC: 14 MMOL/L — SIGNIFICANT CHANGE UP (ref 5–17)
AST SERPL-CCNC: 16 U/L — SIGNIFICANT CHANGE UP
BASOPHILS # BLD AUTO: 0.03 K/UL — SIGNIFICANT CHANGE UP (ref 0–0.2)
BASOPHILS NFR BLD AUTO: 0.3 % — SIGNIFICANT CHANGE UP (ref 0–2)
BILIRUB SERPL-MCNC: <0.2 MG/DL — LOW (ref 0.4–2)
BUN SERPL-MCNC: 29.8 MG/DL — HIGH (ref 8–20)
CALCIUM SERPL-MCNC: 9.1 MG/DL — SIGNIFICANT CHANGE UP (ref 8.4–10.5)
CHLORIDE SERPL-SCNC: 102 MMOL/L — SIGNIFICANT CHANGE UP (ref 96–108)
CO2 SERPL-SCNC: 23 MMOL/L — SIGNIFICANT CHANGE UP (ref 22–29)
CREAT SERPL-MCNC: 0.99 MG/DL — SIGNIFICANT CHANGE UP (ref 0.5–1.3)
EGFR: 57 ML/MIN/1.73M2 — LOW
EOSINOPHIL # BLD AUTO: 0.03 K/UL — SIGNIFICANT CHANGE UP (ref 0–0.5)
EOSINOPHIL NFR BLD AUTO: 0.3 % — SIGNIFICANT CHANGE UP (ref 0–6)
GLUCOSE SERPL-MCNC: 212 MG/DL — HIGH (ref 70–99)
HCT VFR BLD CALC: 36 % — SIGNIFICANT CHANGE UP (ref 34.5–45)
HGB BLD-MCNC: 11.3 G/DL — LOW (ref 11.5–15.5)
IMM GRANULOCYTES NFR BLD AUTO: 1.7 % — HIGH (ref 0–0.9)
LYMPHOCYTES # BLD AUTO: 2.18 K/UL — SIGNIFICANT CHANGE UP (ref 1–3.3)
LYMPHOCYTES # BLD AUTO: 21 % — SIGNIFICANT CHANGE UP (ref 13–44)
MAGNESIUM SERPL-MCNC: 2.3 MG/DL — SIGNIFICANT CHANGE UP (ref 1.6–2.6)
MCHC RBC-ENTMCNC: 29.8 PG — SIGNIFICANT CHANGE UP (ref 27–34)
MCHC RBC-ENTMCNC: 31.4 GM/DL — LOW (ref 32–36)
MCV RBC AUTO: 95 FL — SIGNIFICANT CHANGE UP (ref 80–100)
MONOCYTES # BLD AUTO: 0.65 K/UL — SIGNIFICANT CHANGE UP (ref 0–0.9)
MONOCYTES NFR BLD AUTO: 6.3 % — SIGNIFICANT CHANGE UP (ref 2–14)
NEUTROPHILS # BLD AUTO: 7.29 K/UL — SIGNIFICANT CHANGE UP (ref 1.8–7.4)
NEUTROPHILS NFR BLD AUTO: 70.4 % — SIGNIFICANT CHANGE UP (ref 43–77)
PLATELET # BLD AUTO: 343 K/UL — SIGNIFICANT CHANGE UP (ref 150–400)
POTASSIUM SERPL-MCNC: 4.3 MMOL/L — SIGNIFICANT CHANGE UP (ref 3.5–5.3)
POTASSIUM SERPL-SCNC: 4.3 MMOL/L — SIGNIFICANT CHANGE UP (ref 3.5–5.3)
PROCALCITONIN SERPL-MCNC: 0.06 NG/ML — SIGNIFICANT CHANGE UP (ref 0.02–0.1)
PROT SERPL-MCNC: 6.5 G/DL — LOW (ref 6.6–8.7)
RBC # BLD: 3.79 M/UL — LOW (ref 3.8–5.2)
RBC # FLD: 13.3 % — SIGNIFICANT CHANGE UP (ref 10.3–14.5)
SODIUM SERPL-SCNC: 138 MMOL/L — SIGNIFICANT CHANGE UP (ref 135–145)
TSH SERPL-MCNC: 1.67 UIU/ML — SIGNIFICANT CHANGE UP (ref 0.27–4.2)
VIT B12 SERPL-MCNC: 195 PG/ML — LOW (ref 232–1245)
WBC # BLD: 10.36 K/UL — SIGNIFICANT CHANGE UP (ref 3.8–10.5)
WBC # FLD AUTO: 10.36 K/UL — SIGNIFICANT CHANGE UP (ref 3.8–10.5)

## 2023-02-11 PROCEDURE — 36000 PLACE NEEDLE IN VEIN: CPT | Mod: LT

## 2023-02-11 PROCEDURE — 99233 SBSQ HOSP IP/OBS HIGH 50: CPT

## 2023-02-11 RX ADMIN — Medication 81 MILLIGRAM(S): at 11:11

## 2023-02-11 RX ADMIN — ENOXAPARIN SODIUM 40 MILLIGRAM(S): 100 INJECTION SUBCUTANEOUS at 05:24

## 2023-02-11 RX ADMIN — ATORVASTATIN CALCIUM 20 MILLIGRAM(S): 80 TABLET, FILM COATED ORAL at 21:14

## 2023-02-11 RX ADMIN — Medication 3 MILLILITER(S): at 03:33

## 2023-02-11 RX ADMIN — BUDESONIDE AND FORMOTEROL FUMARATE DIHYDRATE 2 PUFF(S): 160; 4.5 AEROSOL RESPIRATORY (INHALATION) at 08:37

## 2023-02-11 RX ADMIN — Medication 1000 UNIT(S): at 11:11

## 2023-02-11 RX ADMIN — Medication 50 MILLIGRAM(S): at 05:23

## 2023-02-11 RX ADMIN — BUDESONIDE AND FORMOTEROL FUMARATE DIHYDRATE 2 PUFF(S): 160; 4.5 AEROSOL RESPIRATORY (INHALATION) at 20:58

## 2023-02-11 RX ADMIN — Medication 1200 MILLIGRAM(S): at 17:38

## 2023-02-11 RX ADMIN — CEFTRIAXONE 1000 MILLIGRAM(S): 500 INJECTION, POWDER, FOR SOLUTION INTRAMUSCULAR; INTRAVENOUS at 05:22

## 2023-02-11 RX ADMIN — AZITHROMYCIN 255 MILLIGRAM(S): 500 TABLET, FILM COATED ORAL at 05:22

## 2023-02-11 RX ADMIN — PANTOPRAZOLE SODIUM 40 MILLIGRAM(S): 20 TABLET, DELAYED RELEASE ORAL at 07:25

## 2023-02-11 NOTE — PROCEDURE NOTE - ADDITIONAL PROCEDURE DETAILS
US Guided 18g IV placed in left upper arm. +flash, flushes easily. Patient tolerated procedure well with no immediate complications. Tourniquet removed, all sharps disposed of appropriately.

## 2023-02-11 NOTE — PROGRESS NOTE ADULT - SUBJECTIVE AND OBJECTIVE BOX
PULMONARY PROGRESS NOTE      GIANFRANCO VYAS  MRN-[id number]    Patient is a 82y old  Female who presents with a chief complaint of Acute on chronic hypoxemic respiratory failure due to PNA, COPD exacerbation (11 Feb 2023 10:58)      INTERVAL HPI/OVERNIGHT EVENTS:    Pt feels better  Decreased sob and cough    MEDICATIONS  (STANDING):  aspirin enteric coated 81 milliGRAM(s) Oral daily  atorvastatin 20 milliGRAM(s) Oral at bedtime  azithromycin  IVPB 500 milliGRAM(s) IV Intermittent every 24 hours  budesonide 160 MICROgram(s)/formoterol 4.5 MICROgram(s) Inhaler 2 Puff(s) Inhalation two times a day  cefTRIAXone Injectable. 1000 milliGRAM(s) IV Push every 24 hours  cholecalciferol 1000 Unit(s) Oral daily  enoxaparin Injectable 40 milliGRAM(s) SubCutaneous every 24 hours  guaiFENesin ER 1200 milliGRAM(s) Oral every 12 hours  pantoprazole    Tablet 40 milliGRAM(s) Oral before breakfast  predniSONE   Tablet 50 milliGRAM(s) Oral daily      MEDICATIONS  (PRN):  acetaminophen     Tablet .. 650 milliGRAM(s) Oral every 6 hours PRN Temp greater or equal to 38C (100.4F), Mild Pain (1 - 3), Moderate Pain (4 - 6)  benzonatate 100 milliGRAM(s) Oral every 8 hours PRN Cough      Allergies    No Known Allergies    Intolerances        PAST MEDICAL & SURGICAL HISTORY:  Emphysema      Incisional hernia      Obese      MR (mitral regurgitation)  mild      HLD (hyperlipidemia)      Abnormal stress test  11-7-2017, cath done 11/21/17 was negitive      CAD (coronary artery disease)      GERD (gastroesophageal reflux disease)      Cholelithiasis  newly diagnosed.      Osteopenia after menopause      Abnormal stress echo  5/2/2019 suggestive of CAD      S/P small bowel resection  2003      H/O hernia repair  2012      S/P cholecystectomy  August 15 2018            REVIEW OF SYSTEMS: Offers no new complaints    Vital Signs Last 24 Hrs  T(C): 36.7 (11 Feb 2023 11:35), Max: 36.7 (11 Feb 2023 11:35)  T(F): 98 (11 Feb 2023 11:35), Max: 98 (11 Feb 2023 11:35)  HR: 91 (11 Feb 2023 11:35) (81 - 91)  BP: 134/74 (11 Feb 2023 11:35) (115/58 - 134/74)  BP(mean): --  RR: 18 (11 Feb 2023 11:35) (18 - 19)  SpO2: 92% (11 Feb 2023 11:35) (92% - 100%)    Parameters below as of 11 Feb 2023 11:35  Patient On (Oxygen Delivery Method): nasal cannula  O2 Flow (L/min): 2      PHYSICAL EXAMINATION:    GENERAL: The patient is awake and alert in no apparent distress.     HEENT: Head is normocephalic and atraumatic. Extraocular muscles are intact. Mucous membranes are moist.    NECK: Supple.    LUNGS: Clear to auscultation without wheezing, rales or rhonchi; respirations unlabored    HEART: Regular rate and rhythm without murmur.    ABDOMEN: Soft, nontender, and nondistended.      EXTREMITIES: Without any cyanosis, clubbing, rash, lesions or edema.    NEUROLOGIC: Grossly intact.    LABS:                        11.3   10.36 )-----------( 343      ( 11 Feb 2023 05:47 )             36.0     02-11    138  |  102  |  29.8<H>  ----------------------------<  212<H>  4.3   |  23.0  |  0.99    Ca    9.1      11 Feb 2023 05:47  Mg     2.3     02-11    TPro  6.5<L>  /  Alb  3.4  /  TBili  <0.2<L>  /  DBili  x   /  AST  16  /  ALT  19  /  AlkPhos  80  02-11          Serum Pro-Brain Natriuretic Peptide: 135 pg/mL (02-08-23 @ 17:03)      Procalcitonin, Serum: 0.06 ng/mL (02-11-23 @ 05:47)      MICROBIOLOGY:    Respiratory Viral Panel with COVID-19 by NICOLE (02.09.23 @ 20:27)    Rapid RVP Result: [facility]    SARS[facility]CoV-2: [facility]: This Respiratory Panel uses polymerase chain reaction (PCR) to detect for  adenovirus; coronavirus (HKU1, NL63, 229E, OC43); human metapneumovirus  (hMPV); human enterovirus/rhinovirus (Entero/RV); influenza A; influenza  A/H1; influenza A/H3; influenza A/H1-2009; influenza B; parainfluenza  viruses 1, 2, 3, 4; respiratory syncytial virus; Mycoplasma pneumoniae;  Chlamydophila pneumoniae; and SARS-CoV-2.        RADIOLOGY & ADDITIONAL STUDIES:    ACC: [id number] EXAM:  CT ANGIO CHEST PULM [facility]   ORDERED BY: SHANDRA SEGAL     PROCEDURE DATE:  02/08/2023          INTERPRETATION:  CLINICAL INFORMATION: Redness of breath, question   pulmonary embolism    COMPARISON: CT abdomen and pelvis 7/9/2018.    CONTRAST/COMPLICATIONS:  IV Contrast: Omnipaque 350  47 cc administered   53 cc discarded  Oral Contrast: NONE  Complications: None reported at time of study completion    PROCEDURE:  CT of the Chest was performed.  Sagittal and coronal reformats were performed.    FINDINGS:    LUNGS AND AIRWAYS: Patent central airways.  Mild emphysema. Dependent atelectatic changes. Airspace and reticular   opacities in the right lung primarily the right middle lobe and lower   lobe which is suspicious for pneumonia. Correlate clinically and follow   to resolution.  There are a few tiny scattered calcified granulomas. There is a   spiculated nodule in the left upper lobe measuring 1.4 cm (5:29). This   could represent malignancy. Pulmonary evaluationis recommended.  PLEURA: No pleural effusion.  MEDIASTINUM AND ASIF: No lymphadenopathy.  VESSELS: No pulmonary embolism.  HEART: Heart size is normal. No pericardial effusion.  CHEST WALL AND LOWER NECK: Within normal limits.  VISUALIZED UPPER ABDOMEN: Within normal limits.  BONES: Within normal limits.    IMPRESSION:    No pulmonary embolism.    Mild emphysema. Dependent atelectatic changes. Airspace and reticular   opacities in the right lung primarily the right middle lobe and lower   lobe which is suspicious for pneumonia. Correlate clinically and follow   to resolution.    There is a spiculated nodule in the left upper lobe measuring 1.4 cm   (5:29). This could represent malignancy. Pulmonary evaluation is   recommended.          AP GUTIERREZ MD; Attending Radiologist  This document has been electronically signed. Feb 8 2023 11:40PM      ECHO:    Summary:   1. Normal left ventricular internal cavity size.   2. Left ventricular ejection fraction, by visual estimation, is 55 to   60%.   3. Normal global left ventricular systolic function.   4. Normal right ventricular size and function.   5. The left atrium is normal in size.   6. The right atrium is normal in size.   7. Sclerotic aortic valve with normal opening.   8. Mild thickening of the anterior and posterior mitral valve leaflets.   9. Mild to moderate mitral valve regurgitation.  10. There is a significant pericardial fat pad present.  11. There is no evidence of pericardial effusion.    MD Flako Electronically signed on 2/10/2023 at 9:29:33 PM

## 2023-02-11 NOTE — PROGRESS NOTE ADULT - SUBJECTIVE AND OBJECTIVE BOX
Patient is a 82y old  Female who presents with a chief complaint of Acute on chronic hypoxemic respiratory failure due to PNA, COPD exacerbation (10 Feb 2023 16:12)      INTERVAL HPI/OVERNIGHT EVENTS: pt seen and examined. Reports feeling better, still on supplemental O2   Cough is not productive yet, not able to bring up phlegm     MEDICATIONS  (STANDING):  aspirin enteric coated 81 milliGRAM(s) Oral daily  atorvastatin 20 milliGRAM(s) Oral at bedtime  azithromycin  IVPB 500 milliGRAM(s) IV Intermittent every 24 hours  budesonide 160 MICROgram(s)/formoterol 4.5 MICROgram(s) Inhaler 2 Puff(s) Inhalation two times a day  cefTRIAXone Injectable. 1000 milliGRAM(s) IV Push every 24 hours  cholecalciferol 1000 Unit(s) Oral daily  enoxaparin Injectable 40 milliGRAM(s) SubCutaneous every 24 hours  guaiFENesin ER 1200 milliGRAM(s) Oral every 12 hours  pantoprazole    Tablet 40 milliGRAM(s) Oral before breakfast  predniSONE   Tablet 50 milliGRAM(s) Oral daily    MEDICATIONS  (PRN):  acetaminophen     Tablet .. 650 milliGRAM(s) Oral every 6 hours PRN Temp greater or equal to 38C (100.4F), Mild Pain (1 - 3), Moderate Pain (4 - 6)  benzonatate 100 milliGRAM(s) Oral every 8 hours PRN Cough      Allergies    No Known Allergies    Intolerances        REVIEW OF SYSTEMS:  CONSTITUTIONAL: No fever, weight loss, or fatigue  RESPIRATORY: No cough, wheezing, chills or hemoptysis; No shortness of breath  CARDIOVASCULAR: No chest pain, palpitations, dizziness, or leg swelling  GASTROINTESTINAL: No abdominal or epigastric pain. No nausea, vomiting, or hematemesis; No diarrhea or constipation. No melena or hematochezia.  NEUROLOGICAL: No headaches, memory loss, loss of strength, numbness, or tremors  MUSCULOSKELETAL: No joint pain or swelling; No muscle, back, or extremity pain      Vital Signs Last 24 Hrs  T(C): 36.4 (11 Feb 2023 05:33), Max: 36.6 (10 Feb 2023 12:47)  T(F): 97.6 (11 Feb 2023 05:33), Max: 97.8 (10 Feb 2023 12:47)  HR: 83 (11 Feb 2023 08:39) (81 - 84)  BP: 115/58 (11 Feb 2023 05:33) (115/58 - 131/66)  BP(mean): --  RR: 18 (11 Feb 2023 05:33) (18 - 19)  SpO2: 94% (11 Feb 2023 08:39) (92% - 100%)    Parameters below as of 11 Feb 2023 08:39  Patient On (Oxygen Delivery Method): nasal cannula,2 lpm        PHYSICAL EXAM:  GENERAL: NAD, sitting on the bed and eating   HEAD:  Atraumatic, Normocephalic  EYES: EOMI, PERRLA, conjunctiva and sclera clear  NECK: Supple, No JVD, Normal thyroid  NERVOUS SYSTEM:  Alert & Oriented X3, No gross focal deficits  CHEST/LUNG: decrease breath sounds   HEART: Regular rate and rhythm; No murmurs, rubs, or gallops  ABDOMEN: Soft, Nontender, Nondistended; Bowel sounds present  EXTREMITIES:  No clubbing, cyanosis, or edema  SKIN: No rashes or lesions    LABS:                        11.3   10.36 )-----------( 343      ( 11 Feb 2023 05:47 )             36.0     02-11    138  |  102  |  29.8<H>  ----------------------------<  212<H>  4.3   |  23.0  |  0.99    Ca    9.1      11 Feb 2023 05:47  Mg     2.3     02-11    TPro  6.5<L>  /  Alb  3.4  /  TBili  <0.2<L>  /  DBili  x   /  AST  16  /  ALT  19  /  AlkPhos  80  02-11        CAPILLARY BLOOD GLUCOSE          RADIOLOGY & ADDITIONAL TESTS:    Imaging Personally Reviewed:  [ ] YES  [ ] NO    Consultant(s) Notes Reviewed:  [ ] YES  [ ] NO    Care Discussed with Consultants/Other Providers [ ] YES  [ ] NO    Plan of Care discussed with Housestaff [ ]YES [ ] NO

## 2023-02-12 PROCEDURE — 99233 SBSQ HOSP IP/OBS HIGH 50: CPT

## 2023-02-12 RX ADMIN — PANTOPRAZOLE SODIUM 40 MILLIGRAM(S): 20 TABLET, DELAYED RELEASE ORAL at 05:11

## 2023-02-12 RX ADMIN — Medication 1000 UNIT(S): at 11:36

## 2023-02-12 RX ADMIN — AZITHROMYCIN 255 MILLIGRAM(S): 500 TABLET, FILM COATED ORAL at 05:03

## 2023-02-12 RX ADMIN — Medication 81 MILLIGRAM(S): at 11:36

## 2023-02-12 RX ADMIN — Medication 50 MILLIGRAM(S): at 05:02

## 2023-02-12 RX ADMIN — ENOXAPARIN SODIUM 40 MILLIGRAM(S): 100 INJECTION SUBCUTANEOUS at 05:01

## 2023-02-12 RX ADMIN — Medication 1200 MILLIGRAM(S): at 05:02

## 2023-02-12 RX ADMIN — ATORVASTATIN CALCIUM 20 MILLIGRAM(S): 80 TABLET, FILM COATED ORAL at 21:01

## 2023-02-12 RX ADMIN — BUDESONIDE AND FORMOTEROL FUMARATE DIHYDRATE 2 PUFF(S): 160; 4.5 AEROSOL RESPIRATORY (INHALATION) at 19:24

## 2023-02-12 RX ADMIN — Medication 1200 MILLIGRAM(S): at 17:12

## 2023-02-12 RX ADMIN — BUDESONIDE AND FORMOTEROL FUMARATE DIHYDRATE 2 PUFF(S): 160; 4.5 AEROSOL RESPIRATORY (INHALATION) at 09:26

## 2023-02-12 RX ADMIN — CEFTRIAXONE 1000 MILLIGRAM(S): 500 INJECTION, POWDER, FOR SOLUTION INTRAMUSCULAR; INTRAVENOUS at 05:01

## 2023-02-12 NOTE — PROGRESS NOTE ADULT - SUBJECTIVE AND OBJECTIVE BOX
PULMONARY PROGRESS NOTE      GIANFRANCO VYAS  MRN-[id number]    Patient is a 82y old  Female who presents with a chief complaint of Acute on chronic hypoxemic respiratory failure due to PNA, COPD exacerbation (12 Feb 2023 10:50)      INTERVAL HPI/OVERNIGHT EVENTS:    Pt is awake and alert  Feels better  Sitting in chair  Ambulating but with desaturation on RA    MEDICATIONS  (STANDING):  aspirin enteric coated 81 milliGRAM(s) Oral daily  atorvastatin 20 milliGRAM(s) Oral at bedtime  azithromycin  IVPB 500 milliGRAM(s) IV Intermittent every 24 hours  budesonide 160 MICROgram(s)/formoterol 4.5 MICROgram(s) Inhaler 2 Puff(s) Inhalation two times a day  cefTRIAXone Injectable. 1000 milliGRAM(s) IV Push every 24 hours  cholecalciferol 1000 Unit(s) Oral daily  enoxaparin Injectable 40 milliGRAM(s) SubCutaneous every 24 hours  guaiFENesin ER 1200 milliGRAM(s) Oral every 12 hours  pantoprazole    Tablet 40 milliGRAM(s) Oral before breakfast  predniSONE   Tablet 50 milliGRAM(s) Oral daily      MEDICATIONS  (PRN):  acetaminophen     Tablet .. 650 milliGRAM(s) Oral every 6 hours PRN Temp greater or equal to 38C (100.4F), Mild Pain (1 - 3), Moderate Pain (4 - 6)  benzonatate 100 milliGRAM(s) Oral every 8 hours PRN Cough      Allergies    No Known Allergies    Intolerances        PAST MEDICAL & SURGICAL HISTORY:  Emphysema      Incisional hernia      Obese      MR (mitral regurgitation)  mild      HLD (hyperlipidemia)      Abnormal stress test  11-7-2017, cath done 11/21/17 was negitive      CAD (coronary artery disease)      GERD (gastroesophageal reflux disease)      Cholelithiasis  newly diagnosed.      Osteopenia after menopause      Abnormal stress echo  5/2/2019 suggestive of CAD      S/P small bowel resection  2003      H/O hernia repair  2012      S/P cholecystectomy  August 15 2018            REVIEW OF SYSTEMS: Slowly improving; offers no new complaints    Vital Signs Last 24 Hrs  T(C): 36.5 (12 Feb 2023 10:39), Max: 36.5 (12 Feb 2023 10:39)  T(F): 97.7 (12 Feb 2023 10:39), Max: 97.7 (12 Feb 2023 10:39)  HR: 89 (12 Feb 2023 10:39) (70 - 91)  BP: 152/61 (12 Feb 2023 10:39) (127/77 - 152/61)  BP(mean): --  RR: 18 (12 Feb 2023 10:39) (18 - 18)  SpO2: 93% (12 Feb 2023 10:39) (93% - 95%)    Parameters below as of 12 Feb 2023 10:39  Patient On (Oxygen Delivery Method): nasal cannula  O2 Flow (L/min): 2      PHYSICAL EXAMINATION:    GENERAL: The patient is awake and alert in no apparent distress.     HEENT: Head is normocephalic and atraumatic. Extraocular muscles are intact. Mucous membranes are moist.    NECK: Supple.    LUNGS: Clear to auscultation without wheezing or rhonchi; respirations unlabored; decreased rales    HEART: Regular rate and rhythm without murmur.    ABDOMEN: Soft, nontender, and nondistended.      EXTREMITIES: Without any cyanosis, clubbing, rash, lesions or edema.    NEUROLOGIC: Grossly intact.    LABS:                        11.3   10.36 )-----------( 343      ( 11 Feb 2023 05:47 )             36.0     02-11    138  |  102  |  29.8<H>  ----------------------------<  212<H>  4.3   |  23.0  |  0.99    Ca    9.1      11 Feb 2023 05:47  Mg     2.3     02-11    TPro  6.5<L>  /  Alb  3.4  /  TBili  <0.2<L>  /  DBili  x   /  AST  16  /  ALT  19  /  AlkPhos  80  02-11        Procalcitonin, Serum: 0.06 ng/mL (02-11-23 @ 05:47)      MICROBIOLOGY:    Respiratory Viral Panel with COVID-19 by NICOLE (02.09.23 @ 20:27)    Rapid RVP Result: [facility]    SARS-CoV-2: [facility]: This Respiratory Panel uses polymerase chain reaction (PCR) to detect for  adenovirus; coronavirus (HKU1, NL63, 229E, OC43); human metapneumovirus  (hMPV); human enterovirus/rhinovirus (Entero/RV); influenza A; influenza  A/H1; influenza A/H3; influenza A/H1-2009; influenza B; parainfluenza  viruses 1, 2, 3, 4; respiratory syncytial virus; Mycoplasma pneumoniae;  Chlamydophila pneumoniae; and SARS-CoV-2.        RADIOLOGY & ADDITIONAL STUDIES:    ACC: [id number] EXAM:  CT ANGIO CHEST PULM [facility]   ORDERED BY: SHANDRA SEGAL     PROCEDURE DATE:  02/08/2023          INTERPRETATION:  CLINICAL INFORMATION: Redness of breath, question   pulmonary embolism    COMPARISON: CT abdomen and pelvis 7/9/2018.    CONTRAST/COMPLICATIONS:  IV Contrast: Omnipaque 350  47 cc administered   53 cc discarded  Oral Contrast: NONE  Complications: None reported at time of study completion    PROCEDURE:  CT of the Chest was performed.  Sagittal and coronal reformats were performed.    FINDINGS:    LUNGS AND AIRWAYS: Patent central airways.  Mild emphysema. Dependent atelectatic changes. Airspace and reticular   opacities in the right lung primarily the right middle lobe and lower   lobe which is suspicious for pneumonia. Correlate clinically and follow   to resolution.  There are a few tiny scattered calcified granulomas. There is a   spiculated nodule in the left upper lobe measuring 1.4 cm (5:29). This   could represent malignancy. Pulmonary evaluationis recommended.  PLEURA: No pleural effusion.  MEDIASTINUM AND ASIF: No lymphadenopathy.  VESSELS: No pulmonary embolism.  HEART: Heart size is normal. No pericardial effusion.  CHEST WALL AND LOWER NECK: Within normal limits.  VISUALIZED UPPER ABDOMEN: Within normal limits.  BONES: Within normal limits.    IMPRESSION:    No pulmonary embolism.    Mild emphysema. Dependent atelectatic changes. Airspace and reticular   opacities in the right lung primarily the right middle lobe and lower   lobe which is suspicious for pneumonia. Correlate clinically and follow   to resolution.    There is a spiculated nodule in the left upper lobe measuring 1.4 cm   (5:29). This could represent malignancy. Pulmonary evaluation is   recommended.        AP GUTIERREZ MD; Attending Radiologist  This document has been electronically signed. Feb 8 2023 11:40PM      ECHO:    Summary:   1. Normal left ventricular internal cavity size.   2. Left ventricular ejection fraction, by visual estimation, is 55 to   60%.   3. Normal global left ventricular systolic function.   4. Normal right ventricular size and function.   5. The left atrium is normal in size.   6. The right atrium is normal in size.   7. Sclerotic aortic valve with normal opening.   8. Mild thickening of the anterior and posterior mitral valve leaflets.   9. Mild to moderate mitral valve regurgitation.  10. There is a significant pericardial fat pad present.  11. There is no evidence of pericardial effusion.    MD Flako Electronically signed on 2/10/2023 at 9:29:33 PM

## 2023-02-12 NOTE — PROGRESS NOTE ADULT - SUBJECTIVE AND OBJECTIVE BOX
Patient is a 82y old  Female who presents with a chief complaint of Acute on chronic hypoxemic respiratory failure due to PNA, COPD exacerbation (10 Feb 2023 16:12)      INTERVAL HPI/OVERNIGHT EVENTS: pt seen and examined. Reports feeling.   Off supplemental O2 on rest     MEDICATIONS  (STANDING):  aspirin enteric coated 81 milliGRAM(s) Oral daily  atorvastatin 20 milliGRAM(s) Oral at bedtime  azithromycin  IVPB 500 milliGRAM(s) IV Intermittent every 24 hours  budesonide 160 MICROgram(s)/formoterol 4.5 MICROgram(s) Inhaler 2 Puff(s) Inhalation two times a day  cefTRIAXone Injectable. 1000 milliGRAM(s) IV Push every 24 hours  cholecalciferol 1000 Unit(s) Oral daily  enoxaparin Injectable 40 milliGRAM(s) SubCutaneous every 24 hours  guaiFENesin ER 1200 milliGRAM(s) Oral every 12 hours  pantoprazole    Tablet 40 milliGRAM(s) Oral before breakfast  predniSONE   Tablet 50 milliGRAM(s) Oral daily    MEDICATIONS  (PRN):  acetaminophen     Tablet .. 650 milliGRAM(s) Oral every 6 hours PRN Temp greater or equal to 38C (100.4F), Mild Pain (1 - 3), Moderate Pain (4 - 6)  benzonatate 100 milliGRAM(s) Oral every 8 hours PRN Cough      Allergies    No Known Allergies    Intolerances        REVIEW OF SYSTEMS:  CONSTITUTIONAL: No fever, weight loss, or fatigue  RESPIRATORY: No cough, wheezing, chills or hemoptysis; No shortness of breath  CARDIOVASCULAR: No chest pain, palpitations, dizziness, or leg swelling  GASTROINTESTINAL: No abdominal or epigastric pain. No nausea, vomiting, or hematemesis; No diarrhea or constipation. No melena or hematochezia.  NEUROLOGICAL: No headaches, memory loss, loss of strength, numbness, or tremors  MUSCULOSKELETAL: No joint pain or swelling; No muscle, back, or extremity pain      Vital Signs Last 24 Hrs  T(C): 36.4 (11 Feb 2023 05:33), Max: 36.6 (10 Feb 2023 12:47)  T(F): 97.6 (11 Feb 2023 05:33), Max: 97.8 (10 Feb 2023 12:47)  HR: 83 (11 Feb 2023 08:39) (81 - 84)  BP: 115/58 (11 Feb 2023 05:33) (115/58 - 131/66)  BP(mean): --  RR: 18 (11 Feb 2023 05:33) (18 - 19)  SpO2: 94% (11 Feb 2023 08:39) (92% - 100%)    Parameters below as of 11 Feb 2023 08:39  Patient On (Oxygen Delivery Method): nasal cannula,2 lpm        PHYSICAL EXAM:  GENERAL: NAD, sitting on the bed and eating   HEAD:  Atraumatic, Normocephalic  EYES: EOMI, PERRLA, conjunctiva and sclera clear  NECK: Supple, No JVD, Normal thyroid  NERVOUS SYSTEM:  Alert & Oriented X3, No gross focal deficits  CHEST/LUNG: decrease breath sounds   HEART: Regular rate and rhythm; No murmurs, rubs, or gallops  ABDOMEN: Soft, Nontender, Nondistended; Bowel sounds present  EXTREMITIES:  No clubbing, cyanosis, or edema  SKIN: No rashes or lesions    LABS:                        11.3   10.36 )-----------( 343      ( 11 Feb 2023 05:47 )             36.0     02-11    138  |  102  |  29.8<H>  ----------------------------<  212<H>  4.3   |  23.0  |  0.99    Ca    9.1      11 Feb 2023 05:47  Mg     2.3     02-11    TPro  6.5<L>  /  Alb  3.4  /  TBili  <0.2<L>  /  DBili  x   /  AST  16  /  ALT  19  /  AlkPhos  80  02-11        CAPILLARY BLOOD GLUCOSE          RADIOLOGY & ADDITIONAL TESTS:    Imaging Personally Reviewed:  [ ] YES  [ ] NO    Consultant(s) Notes Reviewed:  [ ] YES  [ ] NO    Care Discussed with Consultants/Other Providers [ ] YES  [ ] NO    Plan of Care discussed with Housestaff [ ]YES [ ] NO

## 2023-02-13 PROCEDURE — 99233 SBSQ HOSP IP/OBS HIGH 50: CPT

## 2023-02-13 RX ADMIN — ATORVASTATIN CALCIUM 20 MILLIGRAM(S): 80 TABLET, FILM COATED ORAL at 21:21

## 2023-02-13 RX ADMIN — Medication 1000 UNIT(S): at 11:22

## 2023-02-13 RX ADMIN — Medication 81 MILLIGRAM(S): at 11:21

## 2023-02-13 RX ADMIN — AZITHROMYCIN 255 MILLIGRAM(S): 500 TABLET, FILM COATED ORAL at 05:02

## 2023-02-13 RX ADMIN — BUDESONIDE AND FORMOTEROL FUMARATE DIHYDRATE 2 PUFF(S): 160; 4.5 AEROSOL RESPIRATORY (INHALATION) at 08:52

## 2023-02-13 RX ADMIN — Medication 40 MILLIGRAM(S): at 05:01

## 2023-02-13 RX ADMIN — ENOXAPARIN SODIUM 40 MILLIGRAM(S): 100 INJECTION SUBCUTANEOUS at 05:03

## 2023-02-13 RX ADMIN — Medication 100 MILLIGRAM(S): at 11:20

## 2023-02-13 RX ADMIN — PANTOPRAZOLE SODIUM 40 MILLIGRAM(S): 20 TABLET, DELAYED RELEASE ORAL at 05:02

## 2023-02-13 RX ADMIN — Medication 1200 MILLIGRAM(S): at 17:34

## 2023-02-13 RX ADMIN — Medication 1200 MILLIGRAM(S): at 05:02

## 2023-02-13 RX ADMIN — CEFTRIAXONE 1000 MILLIGRAM(S): 500 INJECTION, POWDER, FOR SOLUTION INTRAMUSCULAR; INTRAVENOUS at 05:01

## 2023-02-13 RX ADMIN — BUDESONIDE AND FORMOTEROL FUMARATE DIHYDRATE 2 PUFF(S): 160; 4.5 AEROSOL RESPIRATORY (INHALATION) at 20:10

## 2023-02-13 NOTE — PROGRESS NOTE ADULT - SUBJECTIVE AND OBJECTIVE BOX
[facility] Division of Hospital Medicine    Chief Complaint:  Acute on chronic hypoxemic respiratory failure 2/2 PNA, COPD exacerbation    SUBJECTIVE / OVERNIGHT EVENTS:  No events overnight, pt seen at bedside, in NAD. Endorses improvement in SOB w/ oxygen.  Patient denies chest pain, abd pain, N/V, fever, chills, dysuria or any other complaints. All remainder ROS negative.     MEDICATIONS  (STANDING):  aspirin enteric coated 81 milliGRAM(s) Oral daily  atorvastatin 20 milliGRAM(s) Oral at bedtime  budesonide 160 MICROgram(s)/formoterol 4.5 MICROgram(s) Inhaler 2 Puff(s) Inhalation two times a day  cefTRIAXone Injectable. 1000 milliGRAM(s) IV Push every 24 hours  cholecalciferol 1000 Unit(s) Oral daily  enoxaparin Injectable 40 milliGRAM(s) SubCutaneous every 24 hours  guaiFENesin ER 1200 milliGRAM(s) Oral every 12 hours  pantoprazole    Tablet 40 milliGRAM(s) Oral before breakfast  predniSONE   Tablet 40 milliGRAM(s) Oral daily    MEDICATIONS  (PRN):  acetaminophen     Tablet .. 650 milliGRAM(s) Oral every 6 hours PRN Temp greater or equal to 38C (100.4F), Mild Pain (1 - 3), Moderate Pain (4 - 6)  benzonatate 100 milliGRAM(s) Oral every 8 hours PRN Cough        I&O's Summary      PHYSICAL EXAM:  Vital Signs Last 24 Hrs  T(C): 36.7 (13 Feb 2023 04:52), Max: 36.7 (13 Feb 2023 04:52)  T(F): 98 (13 Feb 2023 04:52), Max: 98 (13 Feb 2023 04:52)  HR: 71 (13 Feb 2023 04:52) (71 - 91)  BP: 125/75 (13 Feb 2023 04:52) (125/75 - 167/64)  BP(mean): --  RR: 19 (13 Feb 2023 04:52) (18 - 19)  SpO2: 96% (13 Feb 2023 04:52) (93% - 96%)    Parameters below as of 13 Feb 2023 04:52  Patient On (Oxygen Delivery Method): nasal cannula  O2 Flow (L/min): 2        PHYSICAL EXAM:  GENERAL: NAD, sitting on the bed and eating   HEAD:  Atraumatic, Normocephalic  EYES: EOMI, PERRLA, conjunctiva and sclera clear  NECK: Supple, No JVD, Normal thyroid  NERVOUS SYSTEM:  Alert & Oriented X3, No gross focal deficits  CHEST/LUNG: BLAE, no adventitious lung sounds  HEART: Regular rate and rhythm; No murmurs, rubs, or gallops  ABDOMEN: Soft, Nontender, Nondistended; Bowel sounds present  EXTREMITIES:  No clubbing, cyanosis, or edema  SKIN: No rashes or lesions    LABS:                    CAPILLARY BLOOD GLUCOSE            RADIOLOGY & ADDITIONAL TESTS:  Results Reviewed: y  Imaging Personally Reviewed: n  Electrocardiogram Personally Reviewed: dashawn

## 2023-02-13 NOTE — PROGRESS NOTE ADULT - REASON FOR ADMISSION
Acute on chronic hypoxemic respiratory failure due to PNA, COPD exacerbation

## 2023-02-13 NOTE — PROGRESS NOTE ADULT - PROVIDER SPECIALTY LIST ADULT
Hospitalist
Pulmonology
Internal Medicine
Pulmonology
Cardiology

## 2023-02-13 NOTE — PROGRESS NOTE ADULT - ASSESSMENT
82yoF hx former heavy smoker, non-obstructive CAD, COPD not on home O2, HLD, presenting with several days of increased baseline exertional dyspnea associated with worsening productive cough, generalized weakness and malaise. Patient had ct angio chest: neg for PE and found to a pneumonia and a spiculated nodule. Pulm consulted.       #Acute hypoxemic respiratory failure due to PNA and COPD exacerbation  Back on supplemental O2 at rest  Check SpO2 on ambulation   c/w Azithromycin and Ceftriaxone   c/w  Mucinex  Tylenol for pain and aches   c/w Prednisone    c/w Symbicort     #Spiculated lung nodule  -Incidental finding spiculated DG nodule measuring 1.4 cm on CT chest  -Heavy smoking history, high risk for malignant etiology  -Pt informed of result, possibility of malignant process given smoking hx  Repeat imaging as outpatient and pulmonary follow up     #Hx CAD/HLD  -ASA 81mg daily  -Atorvastatin 20mg daily   -B-blocker on hold due to presumed COPD exacerbation     #Hx HTN  -Hold metoprolol 25mg daily due to COPD exacerbation     #healthcare maintenance  DVT PPx -Lovenox 40mg daily       dispo - idalmis castrejon in 1-2 days with pulm clearance. Needs eval for Home Ox tomorrow.
82yoF hx former heavy smoker, non-obstructive CAD, COPD not on home O2, HLD, presenting with several days of increased baseline exertional dyspnea associated with worsening productive cough, generalized weakness and malaise. Patient had ct angio chest: neg for PE and found to a pneumonia and a spiculated nodule. Pulm consulted.       Acute hypoxemic respiratory failure due to PNA and COPD exacerbation  Off supplemental O2 on rest   Check SpO2 on ambulation   c/w Azithromycin and Ceftriaxone   c/w  Mucinex  Tylenol for pain and aches   c/w Prednisone    c/w Symbicort     Spiculated lung nodule  -Incidental finding spiculated DG nodule measuring 1.4 cm on CT chest  -Heavy smoking history, high risk for malignant etiology  -Pt informed of result, possibility of malignant process given smoking hx  Repeat imaging as outpatient and pulmonary follow up     Hx CAD/HLD  -ASA 81mg daily  -Atorvastatin 20mg daily   -B-blocker on hold due to presumed COPD exacerbation     Hx HTN  -Hold metoprolol 25mg daily due to COPD exacerbation     Prophylactic measure  -Lovenox 40mg daily       dispo - idalmis castrejon in 1-2 days with pulm clearance  
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  
82yoF hx former heavy smoker, non-obstructive CAD, COPD not on home O2, HLD, presenting with several days of increased baseline exertional dyspnea associated with worsening productive cough, generalized weakness and malaise. Patient had ct angio chest: neg for PE and found to a pneumonia and a spiculated nodule. Pulm consulted.       Acute hypoxemic respiratory failure due to PNA and COPD exacerbation  -O2 84% on room air as outpatient, arrived to hospital on 3L NC, now on 2liters  taper O2 to keep spo2 above 89%  -CT chest showing R-sided opacities concerning for PNA  -Continue iv ceft and azithro  -taper to po prednisone 50mg today     Spiculated lung nodule  -Incidental finding spiculated DG nodule measuring 1.4 cm on CT chest  -Heavy smoking history, high risk for malignant etiology  -Pt informed of result, possibility of malignant process given smoking hx  -Pulmonary consult, outpatient bx?    Hx CAD/HLD  -ASA 81mg daily  -Atorvastatin 20mg daily   -B-blocker on hold due to presumed COPD exacerbation     Hx HTN  -Hold metoprolol 25mg daily due to COPD exacerbation     Prophylactic measure  -Lovenox 40mg daily     speech consult  dispo - idalmis castrejon in 1-2 days with pulm clearance  
82yoF hx former heavy smoker, non-obstructive CAD, COPD not on home O2, HLD, presenting with several days of increased baseline exertional dyspnea associated with worsening productive cough, generalized weakness and malaise. Patient had ct angio chest: neg for PE and found to a pneumonia and a spiculated nodule. Pulm consulted.       Acute hypoxemic respiratory failure due to PNA and COPD exacerbation  Still on supplemental O2, taper as tolerated   c/w Azithromycin and Ceftriaxone   Add Mucinex  Tylenol for pain and aches   c/w Prednisone      Spiculated lung nodule  -Incidental finding spiculated DG nodule measuring 1.4 cm on CT chest  -Heavy smoking history, high risk for malignant etiology  -Pt informed of result, possibility of malignant process given smoking hx  Repeat imaging as outpatient and pulmonary follow up     Hx CAD/HLD  -ASA 81mg daily  -Atorvastatin 20mg daily   -B-blocker on hold due to presumed COPD exacerbation     Hx HTN  -Hold metoprolol 25mg daily due to COPD exacerbation     Prophylactic measure  -Lovenox 40mg daily       dispo - idalmis castrejon in 1-2 days with pulm clearance  
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  RML and RLL infiltrates c/w pneumonia  DG spiculated nodule - may require w/u with PET CT if persists after therapy  Clinically improved SOB and cough  Former smoker  Leukocytosis resolved    Rec:    Drug nebs  Empiric abx for CAP  Resume outpt BD therapy on d/c  Steroid taper - decreased to 40 mg daily and continue to taper by 5 mg QOD  Wean O2 as deric  Evaluate for home O2 needs prior to d/c  Ambulate  Repeat PFTs as outpt  Follow CT for improvement with therapy  If 1.4 cm apical nodule persists after therapy, consider w/u with PET CT and possible bx if desired by pt  Pulm f/u with chest CT and PFTs as outpt  Little else to add - recall prn
81 yo female with PMHx CAD, GERD, COPD (not on home O2), MR, diastolic HF EF 50 % and HLD presenting from cardiology office with hypoxia and exertional dyspnea as well as a productive cough. Patient states she has had several months of bilateral peripheral edema, has had productive cough with clear sputum and runny nose since January and has had exertional dyspnea since last Thursday.  Patient went to see her cardiologist today and was found to be hypoxic to 85% at which point she was told to come in to the ED. Denies fever, n/v, CP, diaphoresis, syncope, dizziness, lightheadedness, recent travel, or sick contacts.  bnp 135 TROP Negative  CT no PE  opacity right base + nodule  pending pulm consultation

## 2023-02-14 ENCOUNTER — TRANSCRIPTION ENCOUNTER (OUTPATIENT)
Age: 83
End: 2023-02-14

## 2023-02-14 VITALS
OXYGEN SATURATION: 96 % | TEMPERATURE: 98 F | HEART RATE: 75 BPM | DIASTOLIC BLOOD PRESSURE: 68 MMHG | SYSTOLIC BLOOD PRESSURE: 158 MMHG | RESPIRATION RATE: 18 BRPM

## 2023-02-14 LAB
ANION GAP SERPL CALC-SCNC: 12 MMOL/L — SIGNIFICANT CHANGE UP (ref 5–17)
BUN SERPL-MCNC: 30.4 MG/DL — HIGH (ref 8–20)
CALCIUM SERPL-MCNC: 10 MG/DL — SIGNIFICANT CHANGE UP (ref 8.4–10.5)
CHLORIDE SERPL-SCNC: 98 MMOL/L — SIGNIFICANT CHANGE UP (ref 96–108)
CO2 SERPL-SCNC: 28 MMOL/L — SIGNIFICANT CHANGE UP (ref 22–29)
CREAT SERPL-MCNC: 0.96 MG/DL — SIGNIFICANT CHANGE UP (ref 0.5–1.3)
CULTURE RESULTS: SIGNIFICANT CHANGE UP
CULTURE RESULTS: SIGNIFICANT CHANGE UP
EGFR: 59 ML/MIN/1.73M2 — LOW
GLUCOSE SERPL-MCNC: 112 MG/DL — HIGH (ref 70–99)
HCT VFR BLD CALC: 41 % — SIGNIFICANT CHANGE UP (ref 34.5–45)
HGB BLD-MCNC: 12.9 G/DL — SIGNIFICANT CHANGE UP (ref 11.5–15.5)
MCHC RBC-ENTMCNC: 29.9 PG — SIGNIFICANT CHANGE UP (ref 27–34)
MCHC RBC-ENTMCNC: 31.5 GM/DL — LOW (ref 32–36)
MCV RBC AUTO: 95.1 FL — SIGNIFICANT CHANGE UP (ref 80–100)
PLATELET # BLD AUTO: 382 K/UL — SIGNIFICANT CHANGE UP (ref 150–400)
POTASSIUM SERPL-MCNC: 4.2 MMOL/L — SIGNIFICANT CHANGE UP (ref 3.5–5.3)
POTASSIUM SERPL-SCNC: 4.2 MMOL/L — SIGNIFICANT CHANGE UP (ref 3.5–5.3)
RBC # BLD: 4.31 M/UL — SIGNIFICANT CHANGE UP (ref 3.8–5.2)
RBC # FLD: 13.2 % — SIGNIFICANT CHANGE UP (ref 10.3–14.5)
SODIUM SERPL-SCNC: 138 MMOL/L — SIGNIFICANT CHANGE UP (ref 135–145)
SPECIMEN SOURCE: SIGNIFICANT CHANGE UP
SPECIMEN SOURCE: SIGNIFICANT CHANGE UP
WBC # BLD: 12.73 K/UL — HIGH (ref 3.8–10.5)
WBC # FLD AUTO: 12.73 K/UL — HIGH (ref 3.8–10.5)

## 2023-02-14 PROCEDURE — 36415 COLL VENOUS BLD VENIPUNCTURE: CPT

## 2023-02-14 PROCEDURE — 85379 FIBRIN DEGRADATION QUANT: CPT

## 2023-02-14 PROCEDURE — 82330 ASSAY OF CALCIUM: CPT

## 2023-02-14 PROCEDURE — 99239 HOSP IP/OBS DSCHRG MGMT >30: CPT

## 2023-02-14 PROCEDURE — 83605 ASSAY OF LACTIC ACID: CPT

## 2023-02-14 PROCEDURE — 82947 ASSAY GLUCOSE BLOOD QUANT: CPT

## 2023-02-14 PROCEDURE — 83735 ASSAY OF MAGNESIUM: CPT

## 2023-02-14 PROCEDURE — 94640 AIRWAY INHALATION TREATMENT: CPT

## 2023-02-14 PROCEDURE — 82803 BLOOD GASES ANY COMBINATION: CPT

## 2023-02-14 PROCEDURE — 92610 EVALUATE SWALLOWING FUNCTION: CPT

## 2023-02-14 PROCEDURE — 84295 ASSAY OF SERUM SODIUM: CPT

## 2023-02-14 PROCEDURE — 85018 HEMOGLOBIN: CPT

## 2023-02-14 PROCEDURE — 85014 HEMATOCRIT: CPT

## 2023-02-14 PROCEDURE — 84145 PROCALCITONIN (PCT): CPT

## 2023-02-14 PROCEDURE — 84443 ASSAY THYROID STIM HORMONE: CPT

## 2023-02-14 PROCEDURE — 97530 THERAPEUTIC ACTIVITIES: CPT

## 2023-02-14 PROCEDURE — 85027 COMPLETE CBC AUTOMATED: CPT

## 2023-02-14 PROCEDURE — 84132 ASSAY OF SERUM POTASSIUM: CPT

## 2023-02-14 PROCEDURE — 87637 SARSCOV2&INF A&B&RSV AMP PRB: CPT

## 2023-02-14 PROCEDURE — C8929: CPT

## 2023-02-14 PROCEDURE — 83880 ASSAY OF NATRIURETIC PEPTIDE: CPT

## 2023-02-14 PROCEDURE — 85025 COMPLETE CBC W/AUTO DIFF WBC: CPT

## 2023-02-14 PROCEDURE — 96375 TX/PRO/DX INJ NEW DRUG ADDON: CPT

## 2023-02-14 PROCEDURE — 97163 PT EVAL HIGH COMPLEX 45 MIN: CPT

## 2023-02-14 PROCEDURE — 93005 ELECTROCARDIOGRAM TRACING: CPT

## 2023-02-14 PROCEDURE — 96374 THER/PROPH/DIAG INJ IV PUSH: CPT

## 2023-02-14 PROCEDURE — 71045 X-RAY EXAM CHEST 1 VIEW: CPT

## 2023-02-14 PROCEDURE — 80053 COMPREHEN METABOLIC PANEL: CPT

## 2023-02-14 PROCEDURE — 97116 GAIT TRAINING THERAPY: CPT

## 2023-02-14 PROCEDURE — 80048 BASIC METABOLIC PNL TOTAL CA: CPT

## 2023-02-14 PROCEDURE — 99285 EMERGENCY DEPT VISIT HI MDM: CPT | Mod: 25

## 2023-02-14 PROCEDURE — 87040 BLOOD CULTURE FOR BACTERIA: CPT

## 2023-02-14 PROCEDURE — 82435 ASSAY OF BLOOD CHLORIDE: CPT

## 2023-02-14 PROCEDURE — 82607 VITAMIN B-12: CPT

## 2023-02-14 PROCEDURE — 0225U NFCT DS DNA&RNA 21 SARSCOV2: CPT

## 2023-02-14 PROCEDURE — 71275 CT ANGIOGRAPHY CHEST: CPT | Mod: MA

## 2023-02-14 PROCEDURE — 84484 ASSAY OF TROPONIN QUANT: CPT

## 2023-02-14 PROCEDURE — 94760 N-INVAS EAR/PLS OXIMETRY 1: CPT

## 2023-02-14 RX ORDER — CEFPODOXIME PROXETIL 100 MG
1 TABLET ORAL
Qty: 6 | Refills: 0
Start: 2023-02-14 | End: 2023-02-16

## 2023-02-14 RX ADMIN — ENOXAPARIN SODIUM 40 MILLIGRAM(S): 100 INJECTION SUBCUTANEOUS at 05:23

## 2023-02-14 RX ADMIN — PANTOPRAZOLE SODIUM 40 MILLIGRAM(S): 20 TABLET, DELAYED RELEASE ORAL at 05:24

## 2023-02-14 RX ADMIN — BUDESONIDE AND FORMOTEROL FUMARATE DIHYDRATE 2 PUFF(S): 160; 4.5 AEROSOL RESPIRATORY (INHALATION) at 10:13

## 2023-02-14 RX ADMIN — Medication 1000 UNIT(S): at 11:57

## 2023-02-14 RX ADMIN — CEFTRIAXONE 1000 MILLIGRAM(S): 500 INJECTION, POWDER, FOR SOLUTION INTRAMUSCULAR; INTRAVENOUS at 05:23

## 2023-02-14 RX ADMIN — Medication 40 MILLIGRAM(S): at 05:24

## 2023-02-14 RX ADMIN — Medication 81 MILLIGRAM(S): at 11:57

## 2023-02-14 NOTE — DISCHARGE NOTE NURSING/CASE MANAGEMENT/SOCIAL WORK - PATIENT PORTAL LINK FT
You can access the FollowMyHealth Patient Portal offered by [facility] by registering at the following website: [email or web address]. By joining [employer]’s FollowMyHealth portal, you will also be able to view your health information using other applications (apps) compatible with our system.

## 2023-02-14 NOTE — DISCHARGE NOTE PROVIDER - NSDCCPCAREPLAN_GEN_ALL_CORE_FT
PRINCIPAL DISCHARGE DIAGNOSIS  Diagnosis: RLL pneumonia  Assessment and Plan of Treatment: continue with vantin 200mg BID for 3 more days      SECONDARY DISCHARGE DIAGNOSES  Diagnosis: COPD exacerbation  Assessment and Plan of Treatment: resume inhalers as previously prescribed. continue with treatment for pnuemonia as above. continue with prednisone taper as prescribed. follow up with pulmonologist on discharge.     PRINCIPAL DISCHARGE DIAGNOSIS  Diagnosis: RLL pneumonia  Assessment and Plan of Treatment: continue with vantin 200mg BID for 3 more days      SECONDARY DISCHARGE DIAGNOSES  Diagnosis: COPD exacerbation  Assessment and Plan of Treatment: resume inhalers as previously prescribed. continue with treatment for pnuemonia as above. continue with prednisone taper as prescribed. follow up with pulmonologist on discharge.    Diagnosis: Lung nodule  Assessment and Plan of Treatment: based on your CT scan findings you should have a repeat CT chest in 6 weeks to determine the nature of the lung nodule found on this visit. Follow up with pulmonology on discharge.

## 2023-02-14 NOTE — DISCHARGE NOTE NURSING/CASE MANAGEMENT/SOCIAL WORK - NSDCPEFALRISK_GEN_ALL_CORE
For information on Fall & Injury Prevention, visit: [email or web address] OR  [email or web address] OR  https://www.cdc.gov/steadi/patient.html

## 2023-02-14 NOTE — DISCHARGE NOTE PROVIDER - PROVIDER TOKENS
FREE:[LAST:[Isreal],FIRST:[Mina],PHONE:[phone]:[(   )    -],ADDRESS:[[address] [place] [address] [address], [place] [address]]] PROVIDER:[TOKEN:[[id number]]],FREE:[LAST:[Isreal],FIRST:[Mina],PHONE:[phone]],FAX:[(   )    -],ADDRESS:[[address] [place] [address] [address] [address]]] FREE:[LAST:[Isreal],FIRST:[Mina],PHONE:[phone]],FAX:[(   )    -],ADDRESS:[[address] [place] [address] [address] [address]]],PROVIDER:[TOKEN:[[id number]],SCHEDULEDAPPT:[03/07/2023],SCHEDULEDAPPTTIME:[10:30 AM]]

## 2023-02-14 NOTE — CHART NOTE - NSCHARTNOTEFT_GEN_A_CORE
pulmonary follow up appointment made:   3/7/2023  10:30 am  with Dr. Baxter  pulmonary office @ [address] [place] [address], phone number  [phone]

## 2023-02-14 NOTE — DISCHARGE NOTE PROVIDER - CARE PROVIDERS DIRECT ADDRESSES
,DirectAddress_Unknown ,[email or web address],DirectAddress_Unknown ,DirectAddress_Unknown,[email or web address]

## 2023-02-14 NOTE — DISCHARGE NOTE PROVIDER - NSDCMRMEDTOKEN_GEN_ALL_CORE_FT
alendronate 35 mg oral tablet: 1 tab(s) orally once a week  aspirin 81 mg oral delayed release tablet: 1 tab(s) orally once a day  atorvastatin 20 mg oral tablet: 1 tab(s) orally once a day (at bedtime)  cefpodoxime 200 mg oral tablet: 1 tab(s) orally 2 times a day   Metoprolol Succinate ER 25 mg oral tablet, extended release: 1 tab(s) orally once a day  omeprazole 20 mg oral delayed release capsule: 1 cap(s) orally once a day  predniSONE 10 mg oral tablet: 4 tab(s) orally once a day for 2 days then 3 tabs daily for 2 days then 2 tabs daily for 2 days then 1 tab daily for 2 days then stop.  Vitamin D3 25 mcg (1000 intl units) oral tablet: 1 tab(s) orally once a day  Wixela Inhub 250 mcg-50 mcg inhalation powder: 1 puff(s) inhaled 2 times a day

## 2023-02-14 NOTE — DISCHARGE NOTE PROVIDER - NSDCFUSCHEDAPPT_GEN_ALL_CORE_FT
Samuel Baxter  [facility]  PULED [address]  Scheduled Appointment: 03/07/2023

## 2023-02-14 NOTE — DISCHARGE NOTE PROVIDER - CARE PROVIDER_API CALL
Mina Bach  [address], [place] [address]  Phone: [phone]  Fax: (   )    -  Follow Up Time:    Tian Crooks (DO)  Critical Care Medicine; Internal Medicine; Pulmonary Disease  [address]  [place] [address]  Phone: [phone]  Fax: [phone]  Follow Up Time:     Mina [address], [place] [address]  Phone: [phone]  Fax: (   )    -  Follow Up Time:    Mina Bach  [address], [place] [address]  Phone: [phone]  Fax: (   )    -  Follow Up Time:     Samuel Baxter)  Critical Care Medicine; Internal Medicine; Pulmonary Disease; Sleep Medicine  [address], [place] [address]  Phone: [phone]  Fax: [phone]  Scheduled Appointment: 03/07/2023 10:30 AM

## 2023-02-14 NOTE — DISCHARGE NOTE PROVIDER - HOSPITAL COURSE
82yoF hx former heavy smoker, non-obstructive CAD, COPD not on home O2, HLD, presenting with several days of increased baseline exertional dyspnea associated with worsening productive cough, generalized weakness and malaise.  Pt reports chronic unchanged bilateral leg edema which she attributes to venous insufficiency.  She denies any fevers, chills, chest pain.  Pt went to her outpatient cardiologist office regarding her above symptoms, was found to be hypoxic satting 84% on room air, was placed on 3L nasal cannula and sent to hospital.  On admission, pt maintained on nasal cannula and had CTA that was negative for PE but showed R-sided opacities concerning for PNA and incidental finding of spiculated DG nodule.    Patient was admitted and treated for COPD exacerbation secondary to pneumonia. PAtient clinically improved back to baseline but still required O2. Arranged for home oxygen. At this time medically stable for discharge today.

## 2023-02-14 NOTE — DISCHARGE NOTE PROVIDER - ATTENDING DISCHARGE PHYSICAL EXAMINATION:
PHYSICAL EXAM:    General: in no acute distress  Eyes: PERRLA, EOMI; conjunctiva and sclera clear  Head: Normocephalic; atraumatic  ENMT: No nasal discharge; airway clear  Neck: Supple; non tender; no masses  Respiratory: No wheezes, rales or rhonchi  Cardiovascular: Regular rate and rhythm. S1 and S2 Normal; No murmurs, gallops or rubs  Gastrointestinal: Soft non-tender non-distended; Normal bowel sounds  Genitourinary: No costovertebral angle tenderness  Extremities: Normal range of motion, No clubbing, cyanosis; very mild pitting edema bilaterally to the ankles  Vascular: Peripheral pulses palpable 2+ bilaterally  Neurological: Alert and oriented x4  Skin: Warm and dry. No acute rash  Psychiatric: Cooperative and appropriate

## 2023-03-07 ENCOUNTER — APPOINTMENT (OUTPATIENT)
Dept: PULMONOLOGY | Facility: CLINIC | Age: 83
End: 2023-03-07
Payer: MEDICARE

## 2023-03-07 VITALS
HEIGHT: 64 IN | RESPIRATION RATE: 16 BRPM | SYSTOLIC BLOOD PRESSURE: 132 MMHG | WEIGHT: 178 LBS | OXYGEN SATURATION: 97 % | BODY MASS INDEX: 30.39 KG/M2 | DIASTOLIC BLOOD PRESSURE: 78 MMHG | HEART RATE: 89 BPM

## 2023-03-07 PROCEDURE — 99214 OFFICE O/P EST MOD 30 MIN: CPT

## 2023-03-07 NOTE — REASON FOR VISIT
[Initial] : an initial visit [Abnormal CXR/ Chest CT] : an abnormal CXR/ chest CT [Initial Evaluation] : an initial evaluation [COPD] : COPD

## 2023-03-13 ENCOUNTER — OUTPATIENT (OUTPATIENT)
Dept: OUTPATIENT SERVICES | Facility: HOSPITAL | Age: 83
LOS: 1 days | End: 2023-03-13

## 2023-03-13 ENCOUNTER — APPOINTMENT (OUTPATIENT)
Dept: NUCLEAR MEDICINE | Facility: CLINIC | Age: 83
End: 2023-03-13
Payer: MEDICARE

## 2023-03-13 DIAGNOSIS — R91.1 SOLITARY PULMONARY NODULE: ICD-10-CM

## 2023-03-13 DIAGNOSIS — Z98.89 OTHER SPECIFIED POSTPROCEDURAL STATES: Chronic | ICD-10-CM

## 2023-03-13 DIAGNOSIS — Z98.890 OTHER SPECIFIED POSTPROCEDURAL STATES: Chronic | ICD-10-CM

## 2023-03-13 DIAGNOSIS — Z90.49 ACQUIRED ABSENCE OF OTHER SPECIFIED PARTS OF DIGESTIVE TRACT: Chronic | ICD-10-CM

## 2023-03-13 PROCEDURE — 78815 PET IMAGE W/CT SKULL-THIGH: CPT | Mod: 26,PI

## 2023-04-11 ENCOUNTER — APPOINTMENT (OUTPATIENT)
Dept: PULMONOLOGY | Facility: CLINIC | Age: 83
End: 2023-04-11
Payer: MEDICARE

## 2023-04-11 VITALS — HEIGHT: 63 IN | BODY MASS INDEX: 31.89 KG/M2 | WEIGHT: 180 LBS

## 2023-04-11 PROCEDURE — 85018 HEMOGLOBIN: CPT | Mod: QW

## 2023-04-11 PROCEDURE — 94729 DIFFUSING CAPACITY: CPT

## 2023-04-11 PROCEDURE — 94010 BREATHING CAPACITY TEST: CPT

## 2023-04-11 PROCEDURE — 94727 GAS DIL/WSHOT DETER LNG VOL: CPT

## 2023-04-11 NOTE — DISCUSSION/SUMMARY
[FreeTextEntry1] : Moderate COPD\par Using LABA/ICS\par On 02 for sleep and exertion - OK on RA at rest\par Post pneumonia\par Suspicious spiculated DG mass - may need resection

## 2023-04-11 NOTE — HISTORY OF PRESENT ILLNESS
[FreeTextEntry1] : 2019\par 78 year old female former > 100 pack year smoker (DCj in 2009) seen for dyspnea and emphysema\par Not interested in rehab\par No recent imaging\par \par 3/7/23\par Hospital Course: \par Discharge Date	14-Feb-2023 \par Admission Date	09-Feb-2023 00:05 \par Reason for Admission	Acute on chronic hypoxemic respiratory failure due to PNA, \par COPD exacerbation \par Hospital Course	 \par 82yoF hx former heavy smoker, non-obstructive CAD, COPD not on home O2, HLD, \par presenting with several days of increased baseline exertional dyspnea \par associated with worsening productive cough, generalized weakness and malaise. \par Pt reports chronic unchanged bilateral leg edema which she attributes to venous \par insufficiency.  She denies any fevers, chills, chest pain.  Pt went to her \[facility] outpatient cardiologist office regarding her above symptoms, was found to be \par hypoxic satting 84% on room air, was placed on 3L nasal cannula and sent to \[facility] hospital.  On admission, pt maintained on nasal cannula and had CTA that was \par negative for PE but showed R-sided opacities concerning for PNA and incidental \par finding of spiculated DG nodule. \par \par Patient was admitted and treated for COPD exacerbation secondary to pneumonia. \par PAtient clinically improved back to baseline but still required O2. Arranged \par for home oxygen.\par \par Discharge Medications	alendronate 35 mg oral tablet: 1 tab(s) orally once a \par week \par aspirin 81 mg oral delayed release tablet: 1 tab(s) orally once a day \par atorvastatin 20 mg oral tablet: 1 tab(s) orally once a day (at bedtime) \par cefpodoxime 200 mg oral tablet: 1 tab(s) orally 2 times a day \par Metoprolol Succinate ER 25 mg oral tablet, extended release: 1 tab(s) orally \par once a day \par omeprazole 20 mg oral delayed release capsule: 1 cap(s) orally once a day \par predniSONE 10 mg oral tablet: 4 tab(s) orally once a day for 2 days then 3 tabs \par daily for 2 days then 2 tabs daily for 2 days then 1 tab daily for 2 days then \par stop. \par Vitamin D3 25 mcg (1000 intl units) oral tablet: 1 tab(s) orally once a day \par Wixela Inhub 250 mcg-50 mcg inhalation powder: 1 puff(s) inhaled 2 times a day \par \par Diastolic HF\par Moderate COPD\par 1.4 cm spiculated nodule DG \par 100 pack year smoker - DC in 2009\par Doing well clinically \par \par 3/15/23\par PET CT - DG spiculated mass not FDG avid\par Spoke with patient \par PFT on 4/11 and FU on 4/13/23: Probable FU CT in June - Defer Bx for now

## 2023-04-11 NOTE — PHYSICAL EXAM
[Normal Conjunctiva] : the conjunctiva exhibited no abnormalities [Normal Oropharynx] : normal oropharynx [Neck Appearance] : the appearance of the neck was normal [Jugular Venous Distention Increased] : there was no jugular-venous distention [Thyroid Diffuse Enlargement] : the thyroid was not enlarged [Heart Sounds] : normal S1 and S2 [Arterial Pulses Normal] : the arterial pulses were normal [Edema] : no peripheral edema present [Respiration, Rhythm And Depth] : normal respiratory rhythm and effort [Auscultation Breath Sounds / Voice Sounds] : lungs were clear to auscultation bilaterally [Lungs Percussion] : the lungs were normal to percussion [Bowel Sounds] : normal bowel sounds [Abdomen Soft] : soft [Abdomen Tenderness] : non-tender [Abnormal Walk] : normal gait [Nail Clubbing] : no clubbing of the fingernails [Cyanosis, Localized] : no localized cyanosis [Skin Turgor] : normal skin turgor [] : no rash [No Focal Deficits] : no focal deficits [FreeTextEntry1] : Overweight

## 2023-04-13 ENCOUNTER — APPOINTMENT (OUTPATIENT)
Dept: PULMONOLOGY | Facility: CLINIC | Age: 83
End: 2023-04-13
Payer: MEDICARE

## 2023-04-13 VITALS
HEART RATE: 88 BPM | OXYGEN SATURATION: 91 % | RESPIRATION RATE: 16 BRPM | SYSTOLIC BLOOD PRESSURE: 128 MMHG | DIASTOLIC BLOOD PRESSURE: 80 MMHG

## 2023-04-13 PROCEDURE — 99213 OFFICE O/P EST LOW 20 MIN: CPT

## 2023-04-13 NOTE — HISTORY OF PRESENT ILLNESS
[FreeTextEntry1] : 2019\par 78 year old female former > 100 pack year smoker (DCj in 2009) seen for dyspnea and emphysema\par Not interested in rehab\par No recent imaging\par \par 3/7/23\par Hospital Course: \par Discharge Date	14-Feb-2023 \par Admission Date	09-Feb-2023 00:05 \par Reason for Admission	Acute on chronic hypoxemic respiratory failure due to PNA, \par COPD exacerbation \par Hospital Course	 \par 82yoF hx former heavy smoker, non-obstructive CAD, COPD not on home O2, HLD, \par presenting with several days of increased baseline exertional dyspnea \par associated with worsening productive cough, generalized weakness and malaise. \par Pt reports chronic unchanged bilateral leg edema which she attributes to venous \par insufficiency.  She denies any fevers, chills, chest pain.  Pt went to her \[facility] outpatient cardiologist office regarding her above symptoms, was found to be \par hypoxic satting 84% on room air, was placed on 3L nasal cannula and sent to \[facility] hospital.  On admission, pt maintained on nasal cannula and had CTA that was \par negative for PE but showed R-sided opacities concerning for PNA and incidental \par finding of spiculated DG nodule. \par \par Patient was admitted and treated for COPD exacerbation secondary to pneumonia. \par PAtient clinically improved back to baseline but still required O2. Arranged \par for home oxygen.\par \par Discharge Medications	alendronate 35 mg oral tablet: 1 tab(s) orally once a \par week \par aspirin 81 mg oral delayed release tablet: 1 tab(s) orally once a day \par atorvastatin 20 mg oral tablet: 1 tab(s) orally once a day (at bedtime) \par cefpodoxime 200 mg oral tablet: 1 tab(s) orally 2 times a day \par Metoprolol Succinate ER 25 mg oral tablet, extended release: 1 tab(s) orally \par once a day \par omeprazole 20 mg oral delayed release capsule: 1 cap(s) orally once a day \par predniSONE 10 mg oral tablet: 4 tab(s) orally once a day for 2 days then 3 tabs \par daily for 2 days then 2 tabs daily for 2 days then 1 tab daily for 2 days then \par stop. \par Vitamin D3 25 mcg (1000 intl units) oral tablet: 1 tab(s) orally once a day \par Wixela Inhub 250 mcg-50 mcg inhalation powder: 1 puff(s) inhaled 2 times a day \par \par Diastolic HF\par Moderate COPD\par 1.4 cm spiculated nodule DG \par 100 pack year smoker - DC in 2009\par Doing well clinically \par \par 3/15/23\par PET CT - DG spiculated mass not FDG avid\par Spoke with patient \par PFT on 4/11 and FU on 4/13/23: Probable FU CT in June - Defer Bx for now

## 2023-04-14 ENCOUNTER — RX RENEWAL (OUTPATIENT)
Age: 83
End: 2023-04-14

## 2023-04-17 ENCOUNTER — RX RENEWAL (OUTPATIENT)
Age: 83
End: 2023-04-17

## 2023-05-24 ENCOUNTER — APPOINTMENT (OUTPATIENT)
Dept: PULMONOLOGY | Facility: CLINIC | Age: 83
End: 2023-05-24
Payer: MEDICARE

## 2023-05-24 VITALS
SYSTOLIC BLOOD PRESSURE: 132 MMHG | DIASTOLIC BLOOD PRESSURE: 68 MMHG | HEART RATE: 58 BPM | OXYGEN SATURATION: 93 % | HEIGHT: 63 IN | RESPIRATION RATE: 16 BRPM | BODY MASS INDEX: 32.07 KG/M2 | WEIGHT: 181 LBS

## 2023-05-24 PROCEDURE — 99213 OFFICE O/P EST LOW 20 MIN: CPT

## 2023-05-24 NOTE — HISTORY OF PRESENT ILLNESS
[FreeTextEntry1] : 2019\par 78 year old female former > 100 pack year smoker (DCj in 2009) seen for dyspnea and emphysema\par Not interested in rehab\par No recent imaging\par \par 3/7/23\par Hospital Course: \par Discharge Date	14-Feb-2023 \par Admission Date	09-Feb-2023 00:05 \par Reason for Admission	Acute on chronic hypoxemic respiratory failure due to PNA, \par COPD exacerbation \par Hospital Course	 \par 82yoF hx former heavy smoker, non-obstructive CAD, COPD not on home O2, HLD, \par presenting with several days of increased baseline exertional dyspnea \par associated with worsening productive cough, generalized weakness and malaise. \par Pt reports chronic unchanged bilateral leg edema which she attributes to venous \par insufficiency.  She denies any fevers, chills, chest pain.  Pt went to her \[facility] outpatient cardiologist office regarding her above symptoms, was found to be \par hypoxic satting 84% on room air, was placed on 3L nasal cannula and sent to \[facility] hospital.  On admission, pt maintained on nasal cannula and had CTA that was \par negative for PE but showed R-sided opacities concerning for PNA and incidental \par finding of spiculated DG nodule. \par \par Patient was admitted and treated for COPD exacerbation secondary to pneumonia. \par PAtient clinically improved back to baseline but still required O2. Arranged \par for home oxygen.\par \par Discharge Medications	alendronate 35 mg oral tablet: 1 tab(s) orally once a \par week \par aspirin 81 mg oral delayed release tablet: 1 tab(s) orally once a day \par atorvastatin 20 mg oral tablet: 1 tab(s) orally once a day (at bedtime) \par cefpodoxime 200 mg oral tablet: 1 tab(s) orally 2 times a day \par Metoprolol Succinate ER 25 mg oral tablet, extended release: 1 tab(s) orally \par once a day \par omeprazole 20 mg oral delayed release capsule: 1 cap(s) orally once a day \par predniSONE 10 mg oral tablet: 4 tab(s) orally once a day for 2 days then 3 tabs \par daily for 2 days then 2 tabs daily for 2 days then 1 tab daily for 2 days then \par stop. \par Vitamin D3 25 mcg (1000 intl units) oral tablet: 1 tab(s) orally once a day \par Wixela Inhub 250 mcg-50 mcg inhalation powder: 1 puff(s) inhaled 2 times a day \par \par Diastolic HF\par Moderate COPD\par 1.4 cm spiculated nodule DG \par 100 pack year smoker - DC in 2009\par Doing well clinically \par \par 3/15/23\par PET CT - DG spiculated mass not FDG avid\par Spoke with patient \par PFT on 4/11 and FU on 4/13/23: Probable FU CT in June - Defer Bx for now \par \par 5/24/23\par Compliant with and benefiting from nasal oxygen - principally needs it for exertion on her eliptical \par

## 2023-05-24 NOTE — REASON FOR VISIT
[Follow-Up] : a follow-up visit [Abnormal CXR/ Chest CT] : an abnormal CXR/ chest CT [Initial Evaluation] : an initial evaluation [COPD] : COPD

## 2023-05-24 NOTE — DISCUSSION/SUMMARY
[FreeTextEntry1] : Assess\par \par Moderate COPD\par Using LABA/ICS\par On 02 for sleep and exertion - OK on RA at rest\par Post pneumonia\par Suspicious spiculated DG mass - non-avid\par \par

## 2023-10-11 ENCOUNTER — APPOINTMENT (OUTPATIENT)
Dept: PULMONOLOGY | Facility: CLINIC | Age: 83
End: 2023-10-11
Payer: MEDICARE

## 2023-10-11 VITALS
BODY MASS INDEX: 32.25 KG/M2 | SYSTOLIC BLOOD PRESSURE: 128 MMHG | WEIGHT: 182 LBS | DIASTOLIC BLOOD PRESSURE: 80 MMHG | HEIGHT: 63 IN | OXYGEN SATURATION: 94 % | RESPIRATION RATE: 16 BRPM | HEART RATE: 86 BPM

## 2023-10-11 PROCEDURE — 99213 OFFICE O/P EST LOW 20 MIN: CPT

## 2023-10-18 ENCOUNTER — INPATIENT (INPATIENT)
Facility: HOSPITAL | Age: 83
LOS: 2 days | Discharge: HOME | DRG: 189 | End: 2023-10-21
Attending: FAMILY MEDICINE | Admitting: GENERAL ACUTE CARE HOSPITAL
Payer: MEDICARE

## 2023-10-18 VITALS
TEMPERATURE: 98 F | HEART RATE: 103 BPM | DIASTOLIC BLOOD PRESSURE: 65 MMHG | WEIGHT: 184.75 LBS | RESPIRATION RATE: 20 BRPM | OXYGEN SATURATION: 88 % | SYSTOLIC BLOOD PRESSURE: 157 MMHG

## 2023-10-18 DIAGNOSIS — Z98.890 OTHER SPECIFIED POSTPROCEDURAL STATES: Chronic | ICD-10-CM

## 2023-10-18 DIAGNOSIS — Z98.89 OTHER SPECIFIED POSTPROCEDURAL STATES: Chronic | ICD-10-CM

## 2023-10-18 DIAGNOSIS — Z90.49 ACQUIRED ABSENCE OF OTHER SPECIFIED PARTS OF DIGESTIVE TRACT: Chronic | ICD-10-CM

## 2023-10-18 DIAGNOSIS — R06.00 DYSPNEA, UNSPECIFIED: ICD-10-CM

## 2023-10-18 LAB
ALBUMIN SERPL ELPH-MCNC: 3.8 G/DL — SIGNIFICANT CHANGE UP (ref 3.3–5.2)
ALBUMIN SERPL ELPH-MCNC: 3.8 G/DL — SIGNIFICANT CHANGE UP (ref 3.3–5.2)
ALP SERPL-CCNC: 92 U/L — SIGNIFICANT CHANGE UP (ref 40–120)
ALP SERPL-CCNC: 92 U/L — SIGNIFICANT CHANGE UP (ref 40–120)
ALT FLD-CCNC: 17 U/L — SIGNIFICANT CHANGE UP
ALT FLD-CCNC: 17 U/L — SIGNIFICANT CHANGE UP
ANION GAP SERPL CALC-SCNC: 13 MMOL/L — SIGNIFICANT CHANGE UP (ref 5–17)
ANION GAP SERPL CALC-SCNC: 13 MMOL/L — SIGNIFICANT CHANGE UP (ref 5–17)
APTT BLD: 31.9 SEC — SIGNIFICANT CHANGE UP (ref 24.5–35.6)
APTT BLD: 31.9 SEC — SIGNIFICANT CHANGE UP (ref 24.5–35.6)
AST SERPL-CCNC: 20 U/L — SIGNIFICANT CHANGE UP
AST SERPL-CCNC: 20 U/L — SIGNIFICANT CHANGE UP
BASOPHILS # BLD AUTO: 0.04 K/UL — SIGNIFICANT CHANGE UP (ref 0–0.2)
BASOPHILS # BLD AUTO: 0.04 K/UL — SIGNIFICANT CHANGE UP (ref 0–0.2)
BASOPHILS NFR BLD AUTO: 0.3 % — SIGNIFICANT CHANGE UP (ref 0–2)
BASOPHILS NFR BLD AUTO: 0.3 % — SIGNIFICANT CHANGE UP (ref 0–2)
BILIRUB SERPL-MCNC: 1.3 MG/DL — SIGNIFICANT CHANGE UP (ref 0.4–2)
BILIRUB SERPL-MCNC: 1.3 MG/DL — SIGNIFICANT CHANGE UP (ref 0.4–2)
BUN SERPL-MCNC: 14.2 MG/DL — SIGNIFICANT CHANGE UP (ref 8–20)
BUN SERPL-MCNC: 14.2 MG/DL — SIGNIFICANT CHANGE UP (ref 8–20)
CALCIUM SERPL-MCNC: 9.5 MG/DL — SIGNIFICANT CHANGE UP (ref 8.4–10.5)
CALCIUM SERPL-MCNC: 9.5 MG/DL — SIGNIFICANT CHANGE UP (ref 8.4–10.5)
CHLORIDE SERPL-SCNC: 102 MMOL/L — SIGNIFICANT CHANGE UP (ref 96–108)
CHLORIDE SERPL-SCNC: 102 MMOL/L — SIGNIFICANT CHANGE UP (ref 96–108)
CO2 SERPL-SCNC: 25 MMOL/L — SIGNIFICANT CHANGE UP (ref 22–29)
CO2 SERPL-SCNC: 25 MMOL/L — SIGNIFICANT CHANGE UP (ref 22–29)
CREAT SERPL-MCNC: 0.98 MG/DL — SIGNIFICANT CHANGE UP (ref 0.5–1.3)
CREAT SERPL-MCNC: 0.98 MG/DL — SIGNIFICANT CHANGE UP (ref 0.5–1.3)
EGFR: 57 ML/MIN/1.73M2 — LOW
EGFR: 57 ML/MIN/1.73M2 — LOW
EOSINOPHIL # BLD AUTO: 0.02 K/UL — SIGNIFICANT CHANGE UP (ref 0–0.5)
EOSINOPHIL # BLD AUTO: 0.02 K/UL — SIGNIFICANT CHANGE UP (ref 0–0.5)
EOSINOPHIL NFR BLD AUTO: 0.2 % — SIGNIFICANT CHANGE UP (ref 0–6)
EOSINOPHIL NFR BLD AUTO: 0.2 % — SIGNIFICANT CHANGE UP (ref 0–6)
FLUAV AG NPH QL: SIGNIFICANT CHANGE UP
FLUAV AG NPH QL: SIGNIFICANT CHANGE UP
FLUBV AG NPH QL: SIGNIFICANT CHANGE UP
FLUBV AG NPH QL: SIGNIFICANT CHANGE UP
GLUCOSE SERPL-MCNC: 132 MG/DL — HIGH (ref 70–99)
GLUCOSE SERPL-MCNC: 132 MG/DL — HIGH (ref 70–99)
HCT VFR BLD CALC: 39.8 % — SIGNIFICANT CHANGE UP (ref 34.5–45)
HCT VFR BLD CALC: 39.8 % — SIGNIFICANT CHANGE UP (ref 34.5–45)
HGB BLD-MCNC: 12.9 G/DL — SIGNIFICANT CHANGE UP (ref 11.5–15.5)
HGB BLD-MCNC: 12.9 G/DL — SIGNIFICANT CHANGE UP (ref 11.5–15.5)
IMM GRANULOCYTES NFR BLD AUTO: 0.7 % — SIGNIFICANT CHANGE UP (ref 0–0.9)
IMM GRANULOCYTES NFR BLD AUTO: 0.7 % — SIGNIFICANT CHANGE UP (ref 0–0.9)
INR BLD: 1.17 RATIO — SIGNIFICANT CHANGE UP (ref 0.85–1.18)
INR BLD: 1.17 RATIO — SIGNIFICANT CHANGE UP (ref 0.85–1.18)
LYMPHOCYTES # BLD AUTO: 1.43 K/UL — SIGNIFICANT CHANGE UP (ref 1–3.3)
LYMPHOCYTES # BLD AUTO: 1.43 K/UL — SIGNIFICANT CHANGE UP (ref 1–3.3)
LYMPHOCYTES # BLD AUTO: 11 % — LOW (ref 13–44)
LYMPHOCYTES # BLD AUTO: 11 % — LOW (ref 13–44)
MCHC RBC-ENTMCNC: 31 PG — SIGNIFICANT CHANGE UP (ref 27–34)
MCHC RBC-ENTMCNC: 31 PG — SIGNIFICANT CHANGE UP (ref 27–34)
MCHC RBC-ENTMCNC: 32.4 GM/DL — SIGNIFICANT CHANGE UP (ref 32–36)
MCHC RBC-ENTMCNC: 32.4 GM/DL — SIGNIFICANT CHANGE UP (ref 32–36)
MCV RBC AUTO: 95.7 FL — SIGNIFICANT CHANGE UP (ref 80–100)
MCV RBC AUTO: 95.7 FL — SIGNIFICANT CHANGE UP (ref 80–100)
MONOCYTES # BLD AUTO: 1.14 K/UL — HIGH (ref 0–0.9)
MONOCYTES # BLD AUTO: 1.14 K/UL — HIGH (ref 0–0.9)
MONOCYTES NFR BLD AUTO: 8.8 % — SIGNIFICANT CHANGE UP (ref 2–14)
MONOCYTES NFR BLD AUTO: 8.8 % — SIGNIFICANT CHANGE UP (ref 2–14)
NEUTROPHILS # BLD AUTO: 10.26 K/UL — HIGH (ref 1.8–7.4)
NEUTROPHILS # BLD AUTO: 10.26 K/UL — HIGH (ref 1.8–7.4)
NEUTROPHILS NFR BLD AUTO: 79 % — HIGH (ref 43–77)
NEUTROPHILS NFR BLD AUTO: 79 % — HIGH (ref 43–77)
NT-PROBNP SERPL-SCNC: 271 PG/ML — SIGNIFICANT CHANGE UP (ref 0–300)
NT-PROBNP SERPL-SCNC: 271 PG/ML — SIGNIFICANT CHANGE UP (ref 0–300)
PLATELET # BLD AUTO: 229 K/UL — SIGNIFICANT CHANGE UP (ref 150–400)
PLATELET # BLD AUTO: 229 K/UL — SIGNIFICANT CHANGE UP (ref 150–400)
POTASSIUM SERPL-MCNC: 4.5 MMOL/L — SIGNIFICANT CHANGE UP (ref 3.5–5.3)
POTASSIUM SERPL-MCNC: 4.5 MMOL/L — SIGNIFICANT CHANGE UP (ref 3.5–5.3)
POTASSIUM SERPL-SCNC: 4.5 MMOL/L — SIGNIFICANT CHANGE UP (ref 3.5–5.3)
POTASSIUM SERPL-SCNC: 4.5 MMOL/L — SIGNIFICANT CHANGE UP (ref 3.5–5.3)
PROT SERPL-MCNC: 7.2 G/DL — SIGNIFICANT CHANGE UP (ref 6.6–8.7)
PROT SERPL-MCNC: 7.2 G/DL — SIGNIFICANT CHANGE UP (ref 6.6–8.7)
PROTHROM AB SERPL-ACNC: 12.9 SEC — SIGNIFICANT CHANGE UP (ref 9.5–13)
PROTHROM AB SERPL-ACNC: 12.9 SEC — SIGNIFICANT CHANGE UP (ref 9.5–13)
RBC # BLD: 4.16 M/UL — SIGNIFICANT CHANGE UP (ref 3.8–5.2)
RBC # BLD: 4.16 M/UL — SIGNIFICANT CHANGE UP (ref 3.8–5.2)
RBC # FLD: 13.6 % — SIGNIFICANT CHANGE UP (ref 10.3–14.5)
RBC # FLD: 13.6 % — SIGNIFICANT CHANGE UP (ref 10.3–14.5)
RSV RNA NPH QL NAA+NON-PROBE: SIGNIFICANT CHANGE UP
RSV RNA NPH QL NAA+NON-PROBE: SIGNIFICANT CHANGE UP
SARS-COV-2 RNA SPEC QL NAA+PROBE: SIGNIFICANT CHANGE UP
SARS-COV-2 RNA SPEC QL NAA+PROBE: SIGNIFICANT CHANGE UP
SODIUM SERPL-SCNC: 140 MMOL/L — SIGNIFICANT CHANGE UP (ref 135–145)
SODIUM SERPL-SCNC: 140 MMOL/L — SIGNIFICANT CHANGE UP (ref 135–145)
TROPONIN T SERPL-MCNC: <0.01 NG/ML — SIGNIFICANT CHANGE UP (ref 0–0.06)
TROPONIN T SERPL-MCNC: <0.01 NG/ML — SIGNIFICANT CHANGE UP (ref 0–0.06)
WBC # BLD: 12.98 K/UL — HIGH (ref 3.8–10.5)
WBC # BLD: 12.98 K/UL — HIGH (ref 3.8–10.5)
WBC # FLD AUTO: 12.98 K/UL — HIGH (ref 3.8–10.5)
WBC # FLD AUTO: 12.98 K/UL — HIGH (ref 3.8–10.5)

## 2023-10-18 PROCEDURE — 99285 EMERGENCY DEPT VISIT HI MDM: CPT

## 2023-10-18 PROCEDURE — 71045 X-RAY EXAM CHEST 1 VIEW: CPT | Mod: 26

## 2023-10-18 PROCEDURE — 93010 ELECTROCARDIOGRAM REPORT: CPT

## 2023-10-18 PROCEDURE — 99222 1ST HOSP IP/OBS MODERATE 55: CPT

## 2023-10-18 RX ORDER — IPRATROPIUM/ALBUTEROL SULFATE 18-103MCG
3 AEROSOL WITH ADAPTER (GRAM) INHALATION EVERY 6 HOURS
Refills: 0 | Status: DISCONTINUED | OUTPATIENT
Start: 2023-10-18 | End: 2023-10-21

## 2023-10-18 RX ORDER — AZITHROMYCIN 500 MG/1
500 TABLET, FILM COATED ORAL EVERY 24 HOURS
Refills: 0 | Status: DISCONTINUED | OUTPATIENT
Start: 2023-10-18 | End: 2023-10-21

## 2023-10-18 RX ORDER — DEXAMETHASONE 0.5 MG/5ML
10 ELIXIR ORAL ONCE
Refills: 0 | Status: COMPLETED | OUTPATIENT
Start: 2023-10-18 | End: 2023-10-18

## 2023-10-18 RX ORDER — IPRATROPIUM/ALBUTEROL SULFATE 18-103MCG
3 AEROSOL WITH ADAPTER (GRAM) INHALATION ONCE
Refills: 0 | Status: COMPLETED | OUTPATIENT
Start: 2023-10-18 | End: 2023-10-18

## 2023-10-18 RX ORDER — ACETAMINOPHEN 500 MG
650 TABLET ORAL EVERY 6 HOURS
Refills: 0 | Status: DISCONTINUED | OUTPATIENT
Start: 2023-10-18 | End: 2023-10-21

## 2023-10-18 RX ORDER — LANOLIN ALCOHOL/MO/W.PET/CERES
3 CREAM (GRAM) TOPICAL AT BEDTIME
Refills: 0 | Status: DISCONTINUED | OUTPATIENT
Start: 2023-10-18 | End: 2023-10-21

## 2023-10-18 RX ORDER — SODIUM CHLORIDE 9 MG/ML
3 INJECTION INTRAMUSCULAR; INTRAVENOUS; SUBCUTANEOUS EVERY 8 HOURS
Refills: 0 | Status: DISCONTINUED | OUTPATIENT
Start: 2023-10-18 | End: 2023-10-21

## 2023-10-18 RX ORDER — HEPARIN SODIUM 5000 [USP'U]/ML
5000 INJECTION INTRAVENOUS; SUBCUTANEOUS EVERY 12 HOURS
Refills: 0 | Status: DISCONTINUED | OUTPATIENT
Start: 2023-10-18 | End: 2023-10-21

## 2023-10-18 RX ORDER — ONDANSETRON 8 MG/1
4 TABLET, FILM COATED ORAL EVERY 8 HOURS
Refills: 0 | Status: DISCONTINUED | OUTPATIENT
Start: 2023-10-18 | End: 2023-10-21

## 2023-10-18 RX ORDER — BUDESONIDE AND FORMOTEROL FUMARATE DIHYDRATE 160; 4.5 UG/1; UG/1
2 AEROSOL RESPIRATORY (INHALATION)
Refills: 0 | Status: DISCONTINUED | OUTPATIENT
Start: 2023-10-18 | End: 2023-10-21

## 2023-10-18 RX ADMIN — Medication 40 MILLIGRAM(S): at 22:59

## 2023-10-18 RX ADMIN — Medication 3 MILLILITER(S): at 15:07

## 2023-10-18 RX ADMIN — AZITHROMYCIN 255 MILLIGRAM(S): 500 TABLET, FILM COATED ORAL at 23:00

## 2023-10-18 RX ADMIN — SODIUM CHLORIDE 3 MILLILITER(S): 9 INJECTION INTRAMUSCULAR; INTRAVENOUS; SUBCUTANEOUS at 23:00

## 2023-10-18 RX ADMIN — Medication 102 MILLIGRAM(S): at 15:07

## 2023-10-18 RX ADMIN — Medication 10 MILLIGRAM(S): at 15:37

## 2023-10-18 NOTE — ED PROVIDER NOTE - PHYSICAL EXAMINATION
Gen: NAD, AOx3  Head: NCAT  HEENT: EOMI, oral mucosa moist, normal conjunctiva, neck supple  Lung: mild wheezing on the left upper lobe, on nasal cannula 98% SpO2  CV: rrr, no murmur, Normal perfusion  Abd: soft, NTND  MSK: No edema, no visible deformities  Neuro: No focal neurologic deficits  Skin: No rash   Psych: normal affect

## 2023-10-18 NOTE — ED PROVIDER NOTE - NS ED ROS FT
ROS: SOB. no cough. no CP. no fever. no n/v/d/c. no abd pain. no rash. no bleeding. no urinary complaints. no weakness. no vision changes. no HA. no neck/back pain. no extremity swelling/deformity. No change in mental status.

## 2023-10-18 NOTE — ED ADULT NURSE NOTE - NSFALLUNIVINTERV_ED_ALL_ED
Bed/Stretcher in lowest position, wheels locked, appropriate side rails in place/Call bell, personal items and telephone in reach/Instruct patient to call for assistance before getting out of bed/chair/stretcher/Non-slip footwear applied when patient is off stretcher/[place] to call system/Physically safe environment - no spills, clutter or unnecessary equipment/Purposeful proactive rounding/Room/bathroom lighting operational, light cord in reach

## 2023-10-18 NOTE — ED ADULT NURSE NOTE - OBJECTIVE STATEMENT
Pt c/o cough and SOB since Monday. States symptoms worsen on exertion but when sitting/lying down they are not present. Denies fevers, chills, N/V.  Pt denies oxygen use at home. Pt c/o cough and SOB since Monday. States symptoms worsen on exertion but when sitting/lying down they are not present. Denies fevers, chills, N/V.  Pt denies oxygen use at home. Currently on 2L NC. Awaiting further care plan. Pt c/o cough and SOB since Monday. States symptoms worsen on exertion but when sitting/lying down they are not present. Denies fevers, chills, N/V.  Pt denies oxygen use at home. Pt hypoxic 88% on RA. Placed on 2L NC now satting 94%. Awaiting further care plan.

## 2023-10-18 NOTE — ED PROVIDER NOTE - ATTENDING CONTRIBUTION TO CARE
84 yo female pmh copd with uri symptoms with cough grayish phlegm with sob on exertion; pt noted with + sick contacts at home;  pe awake alert heent ncat neck supple cor s1s2 lung + wheezing abd soft nontender neuro nonfocal dx sob; eval copd exac; pneumonia ; viral

## 2023-10-18 NOTE — ED PROVIDER NOTE - CLINICAL SUMMARY MEDICAL DECISION MAKING FREE TEXT BOX
82 y/o female w/PMHx of emphysema, HTN, CAD, and HLD presents to the ED with SOB. Patient came in with SpO2 88%, placed on nasal cannula with observed improvement to 98%. URI viral panel, chest xray, and blood workup started for likely COPD exacerbation. Patient given 3ml Duoneb. Will reassess.

## 2023-10-18 NOTE — ED ADULT NURSE REASSESSMENT NOTE - NS ED NURSE REASSESS COMMENT FT1
Patient received from RN @ shift change. pt resting comfortably in bed. No new CO pain or discomfort @ this time. A&Ox4, HEENT clear, LS equal clear bilat, denies SOB, pt on 3L NC, denies CP, abd SNT. IV site assessed - dry intact transparent, flushing well, no abnormalities noted. Pt remains on monitor.
Pt remains resting in bed on monitor. IV site assessed - dry intact transparent, flushing well, no abnormalities noted. No new CO pain or discomfort @ this time. Pt ambulated to bathroom independently with steady gait on 3L NC. Awaiting bed.
Pt resting comfortably in bed. VSS. Awaiting transfer to [facility] bed at this time.
Patient received from RN @ this time. Pt resting in bed. IV sites assessed - dry intact transparent, flushing well, no abnormalities noted. Pt remains on monitor. A&Ox4, HEENT clear, LS equal clear bilat, denies new SOB, pt on 3L NC, denies CP, abd SNT. Pt refusing gown @ this time.

## 2023-10-18 NOTE — ED PROVIDER NOTE - OBJECTIVE STATEMENT
82 y/o female w/PMHx of emphysema, HTN, CAD, and HLD presents to the ED with SOB. Says it began 3 days ago and is mostly associated with exertion or laying down. Has been having cough with gray phlegm. Daughter, Carlyn is with her and says the entire family has the flu and they are worried she has it now too. Denies fever, chills, nausea, vomiting, chest pain, weakness, tingling, dizziness, and LOC. States she has been on oxygen up until June 2023. Currently using an inhaler without relief. Patient came in with SpO2 88%, placed on nasal cannula with observed improvement to 98%. No known allergies.

## 2023-10-18 NOTE — ED ADULT TRIAGE NOTE - CHIEF COMPLAINT QUOTE
pt c/o cough and sob x3 days, pt not on o2 @ home @ this time, pt denies cp, fevers or n/v/d. EKG obtained on arrival.

## 2023-10-18 NOTE — ED ADULT TRIAGE NOTE - NS ED TRIAGE AVPU SCALE
September 25, 2017      Jeff Yan MD  [address]  [facility] [address]           [facility]  [address]  [facility] [address]  Phone: [phone]  Fax: [phone]          Patient: Mark Anthony Velarde Jr.   MR Number: [id number]   YOB: 1938   Date of Visit: 9/22/2017       Dear Dr. Jeff Yan:    Thank you for referring Mark Anthony Velarde to me for evaluation. Attached you will find relevant portions of my assessment and plan of care.    If you have questions, please do not hesitate to call me. I look forward to following Mark Anthony Velarde along with you.    Sincerely,        Enclosure  CC:  No Recipients    If you would like to receive this communication electronically, please contact [email or web address] or [phone] to request more information on [facility] access.    For providers and/or their staff who would like to refer a patient to [facility], please contact us through our one-stop-shop provider referral line, [facility], at [phone].    If you feel you have received this communication in error or would no longer like to receive these types of communications, please e-mail [email or web address]         
Alert-The patient is alert, awake and responds to voice. The patient is oriented to time, place, and person. The triage nurse is able to obtain subjective information.

## 2023-10-19 LAB
ANION GAP SERPL CALC-SCNC: 15 MMOL/L — SIGNIFICANT CHANGE UP (ref 5–17)
ANION GAP SERPL CALC-SCNC: 15 MMOL/L — SIGNIFICANT CHANGE UP (ref 5–17)
BUN SERPL-MCNC: 14.6 MG/DL — SIGNIFICANT CHANGE UP (ref 8–20)
BUN SERPL-MCNC: 14.6 MG/DL — SIGNIFICANT CHANGE UP (ref 8–20)
CALCIUM SERPL-MCNC: 9.5 MG/DL — SIGNIFICANT CHANGE UP (ref 8.4–10.5)
CALCIUM SERPL-MCNC: 9.5 MG/DL — SIGNIFICANT CHANGE UP (ref 8.4–10.5)
CHLORIDE SERPL-SCNC: 101 MMOL/L — SIGNIFICANT CHANGE UP (ref 96–108)
CHLORIDE SERPL-SCNC: 101 MMOL/L — SIGNIFICANT CHANGE UP (ref 96–108)
CO2 SERPL-SCNC: 24 MMOL/L — SIGNIFICANT CHANGE UP (ref 22–29)
CO2 SERPL-SCNC: 24 MMOL/L — SIGNIFICANT CHANGE UP (ref 22–29)
CREAT SERPL-MCNC: 0.87 MG/DL — SIGNIFICANT CHANGE UP (ref 0.5–1.3)
CREAT SERPL-MCNC: 0.87 MG/DL — SIGNIFICANT CHANGE UP (ref 0.5–1.3)
EGFR: 66 ML/MIN/1.73M2 — SIGNIFICANT CHANGE UP
EGFR: 66 ML/MIN/1.73M2 — SIGNIFICANT CHANGE UP
GLUCOSE BLDC GLUCOMTR-MCNC: 258 MG/DL — HIGH (ref 70–99)
GLUCOSE BLDC GLUCOMTR-MCNC: 258 MG/DL — HIGH (ref 70–99)
GLUCOSE BLDC GLUCOMTR-MCNC: 271 MG/DL — HIGH (ref 70–99)
GLUCOSE BLDC GLUCOMTR-MCNC: 271 MG/DL — HIGH (ref 70–99)
GLUCOSE BLDC GLUCOMTR-MCNC: 278 MG/DL — HIGH (ref 70–99)
GLUCOSE BLDC GLUCOMTR-MCNC: 278 MG/DL — HIGH (ref 70–99)
GLUCOSE SERPL-MCNC: 223 MG/DL — HIGH (ref 70–99)
GLUCOSE SERPL-MCNC: 223 MG/DL — HIGH (ref 70–99)
HCT VFR BLD CALC: 38 % — SIGNIFICANT CHANGE UP (ref 34.5–45)
HCT VFR BLD CALC: 38 % — SIGNIFICANT CHANGE UP (ref 34.5–45)
HGB BLD-MCNC: 12.1 G/DL — SIGNIFICANT CHANGE UP (ref 11.5–15.5)
HGB BLD-MCNC: 12.1 G/DL — SIGNIFICANT CHANGE UP (ref 11.5–15.5)
MAGNESIUM SERPL-MCNC: 2.3 MG/DL — SIGNIFICANT CHANGE UP (ref 1.6–2.6)
MAGNESIUM SERPL-MCNC: 2.3 MG/DL — SIGNIFICANT CHANGE UP (ref 1.6–2.6)
MCHC RBC-ENTMCNC: 30.3 PG — SIGNIFICANT CHANGE UP (ref 27–34)
MCHC RBC-ENTMCNC: 30.3 PG — SIGNIFICANT CHANGE UP (ref 27–34)
MCHC RBC-ENTMCNC: 31.8 GM/DL — LOW (ref 32–36)
MCHC RBC-ENTMCNC: 31.8 GM/DL — LOW (ref 32–36)
MCV RBC AUTO: 95 FL — SIGNIFICANT CHANGE UP (ref 80–100)
MCV RBC AUTO: 95 FL — SIGNIFICANT CHANGE UP (ref 80–100)
PHOSPHATE SERPL-MCNC: 2.7 MG/DL — SIGNIFICANT CHANGE UP (ref 2.4–4.7)
PHOSPHATE SERPL-MCNC: 2.7 MG/DL — SIGNIFICANT CHANGE UP (ref 2.4–4.7)
PLATELET # BLD AUTO: 257 K/UL — SIGNIFICANT CHANGE UP (ref 150–400)
PLATELET # BLD AUTO: 257 K/UL — SIGNIFICANT CHANGE UP (ref 150–400)
POTASSIUM SERPL-MCNC: 4.2 MMOL/L — SIGNIFICANT CHANGE UP (ref 3.5–5.3)
POTASSIUM SERPL-MCNC: 4.2 MMOL/L — SIGNIFICANT CHANGE UP (ref 3.5–5.3)
POTASSIUM SERPL-SCNC: 4.2 MMOL/L — SIGNIFICANT CHANGE UP (ref 3.5–5.3)
POTASSIUM SERPL-SCNC: 4.2 MMOL/L — SIGNIFICANT CHANGE UP (ref 3.5–5.3)
RBC # BLD: 4 M/UL — SIGNIFICANT CHANGE UP (ref 3.8–5.2)
RBC # BLD: 4 M/UL — SIGNIFICANT CHANGE UP (ref 3.8–5.2)
RBC # FLD: 13.4 % — SIGNIFICANT CHANGE UP (ref 10.3–14.5)
RBC # FLD: 13.4 % — SIGNIFICANT CHANGE UP (ref 10.3–14.5)
SODIUM SERPL-SCNC: 140 MMOL/L — SIGNIFICANT CHANGE UP (ref 135–145)
SODIUM SERPL-SCNC: 140 MMOL/L — SIGNIFICANT CHANGE UP (ref 135–145)
WBC # BLD: 10.39 K/UL — SIGNIFICANT CHANGE UP (ref 3.8–10.5)
WBC # BLD: 10.39 K/UL — SIGNIFICANT CHANGE UP (ref 3.8–10.5)
WBC # FLD AUTO: 10.39 K/UL — SIGNIFICANT CHANGE UP (ref 3.8–10.5)
WBC # FLD AUTO: 10.39 K/UL — SIGNIFICANT CHANGE UP (ref 3.8–10.5)

## 2023-10-19 PROCEDURE — 99233 SBSQ HOSP IP/OBS HIGH 50: CPT

## 2023-10-19 RX ORDER — GLUCAGON INJECTION, SOLUTION 0.5 MG/.1ML
1 INJECTION, SOLUTION SUBCUTANEOUS ONCE
Refills: 0 | Status: DISCONTINUED | OUTPATIENT
Start: 2023-10-19 | End: 2023-10-21

## 2023-10-19 RX ORDER — FLUTICASONE PROPIONATE AND SALMETEROL 50; 250 UG/1; UG/1
1 POWDER ORAL; RESPIRATORY (INHALATION)
Qty: 0 | Refills: 0 | DISCHARGE

## 2023-10-19 RX ORDER — DEXTROSE 50 % IN WATER 50 %
25 SYRINGE (ML) INTRAVENOUS ONCE
Refills: 0 | Status: DISCONTINUED | OUTPATIENT
Start: 2023-10-19 | End: 2023-10-21

## 2023-10-19 RX ORDER — DEXTROSE 50 % IN WATER 50 %
15 SYRINGE (ML) INTRAVENOUS ONCE
Refills: 0 | Status: DISCONTINUED | OUTPATIENT
Start: 2023-10-19 | End: 2023-10-21

## 2023-10-19 RX ORDER — METOPROLOL TARTRATE 50 MG
25 TABLET ORAL DAILY
Refills: 0 | Status: DISCONTINUED | OUTPATIENT
Start: 2023-10-19 | End: 2023-10-21

## 2023-10-19 RX ORDER — PANTOPRAZOLE SODIUM 20 MG/1
40 TABLET, DELAYED RELEASE ORAL DAILY
Refills: 0 | Status: DISCONTINUED | OUTPATIENT
Start: 2023-10-19 | End: 2023-10-21

## 2023-10-19 RX ORDER — DEXTROSE 50 % IN WATER 50 %
12.5 SYRINGE (ML) INTRAVENOUS ONCE
Refills: 0 | Status: DISCONTINUED | OUTPATIENT
Start: 2023-10-19 | End: 2023-10-21

## 2023-10-19 RX ORDER — ASPIRIN/CALCIUM CARB/MAGNESIUM 324 MG
81 TABLET ORAL DAILY
Refills: 0 | Status: DISCONTINUED | OUTPATIENT
Start: 2023-10-19 | End: 2023-10-21

## 2023-10-19 RX ORDER — INSULIN LISPRO 100/ML
VIAL (ML) SUBCUTANEOUS AT BEDTIME
Refills: 0 | Status: DISCONTINUED | OUTPATIENT
Start: 2023-10-19 | End: 2023-10-21

## 2023-10-19 RX ORDER — SODIUM CHLORIDE 9 MG/ML
1000 INJECTION, SOLUTION INTRAVENOUS
Refills: 0 | Status: DISCONTINUED | OUTPATIENT
Start: 2023-10-19 | End: 2023-10-21

## 2023-10-19 RX ORDER — INFLUENZA VIRUS VACCINE 15; 15; 15; 15 UG/.5ML; UG/.5ML; UG/.5ML; UG/.5ML
0.7 SUSPENSION INTRAMUSCULAR ONCE
Refills: 0 | Status: DISCONTINUED | OUTPATIENT
Start: 2023-10-19 | End: 2023-10-21

## 2023-10-19 RX ORDER — INSULIN LISPRO 100/ML
VIAL (ML) SUBCUTANEOUS
Refills: 0 | Status: DISCONTINUED | OUTPATIENT
Start: 2023-10-19 | End: 2023-10-21

## 2023-10-19 RX ADMIN — SODIUM CHLORIDE 3 MILLILITER(S): 9 INJECTION INTRAMUSCULAR; INTRAVENOUS; SUBCUTANEOUS at 05:29

## 2023-10-19 RX ADMIN — Medication 81 MILLIGRAM(S): at 18:28

## 2023-10-19 RX ADMIN — Medication 25 MILLIGRAM(S): at 13:12

## 2023-10-19 RX ADMIN — HEPARIN SODIUM 5000 UNIT(S): 5000 INJECTION INTRAVENOUS; SUBCUTANEOUS at 18:29

## 2023-10-19 RX ADMIN — Medication 40 MILLIGRAM(S): at 13:12

## 2023-10-19 RX ADMIN — HEPARIN SODIUM 5000 UNIT(S): 5000 INJECTION INTRAVENOUS; SUBCUTANEOUS at 05:32

## 2023-10-19 RX ADMIN — Medication 3: at 18:29

## 2023-10-19 RX ADMIN — Medication 3 MILLILITER(S): at 09:26

## 2023-10-19 RX ADMIN — Medication 3 MILLILITER(S): at 15:53

## 2023-10-19 RX ADMIN — Medication 3 MILLILITER(S): at 04:19

## 2023-10-19 RX ADMIN — PANTOPRAZOLE SODIUM 40 MILLIGRAM(S): 20 TABLET, DELAYED RELEASE ORAL at 13:12

## 2023-10-19 RX ADMIN — Medication 40 MILLIGRAM(S): at 21:16

## 2023-10-19 RX ADMIN — SODIUM CHLORIDE 3 MILLILITER(S): 9 INJECTION INTRAMUSCULAR; INTRAVENOUS; SUBCUTANEOUS at 13:17

## 2023-10-19 RX ADMIN — BUDESONIDE AND FORMOTEROL FUMARATE DIHYDRATE 2 PUFF(S): 160; 4.5 AEROSOL RESPIRATORY (INHALATION) at 20:17

## 2023-10-19 RX ADMIN — Medication 40 MILLIGRAM(S): at 05:33

## 2023-10-19 RX ADMIN — SODIUM CHLORIDE 3 MILLILITER(S): 9 INJECTION INTRAMUSCULAR; INTRAVENOUS; SUBCUTANEOUS at 21:15

## 2023-10-19 RX ADMIN — BUDESONIDE AND FORMOTEROL FUMARATE DIHYDRATE 2 PUFF(S): 160; 4.5 AEROSOL RESPIRATORY (INHALATION) at 09:21

## 2023-10-19 RX ADMIN — Medication 3: at 13:13

## 2023-10-19 RX ADMIN — Medication 3 MILLILITER(S): at 20:16

## 2023-10-19 RX ADMIN — AZITHROMYCIN 255 MILLIGRAM(S): 500 TABLET, FILM COATED ORAL at 22:31

## 2023-10-19 RX ADMIN — Medication 1: at 21:15

## 2023-10-19 NOTE — H&P ADULT - ASSESSMENT
84 y/o female with AECOPD, hypoxia, hx of CAD, HLD    AECOPD:  -Admit to   -Pulse steroids, ATC nebs, incentive spirometer  -supplemental 02  -Atypical coverage-Zithromax  -CXR as above  -CM for possible need for home 02 on DC  -DVT-P w/ Sub Q heparing  -OOB, ambulate, fall risk     CAD:  -No anginal symptoms  -Cont. BB, Statin, Aspirin  -Trop neg, EKG non acute changes    HLD:  -Statin     Discussed with ED staff, Patient

## 2023-10-19 NOTE — H&P ADULT - HISTORY OF PRESENT ILLNESS
84 y/o female with hx of CAD, HLD, COPD who was on home 02 in the past but states she didnt need it anymore and it was stopped in May. She resides at home with family and is normally independent but over the past 3-5 days she has been having increasing SOB, wheezing, and dry cough. Denies fever, chills, N/V, CP. No falls. She has been around multiple sick contacts at home with URI/Flu like symptoms. Denies any changes in medications, and no travel. In the ED noted to be wheezing diffusely and was hypoxic to 87% on RA minimal exertion. RVP neg, no infiltrates on CXR. Mild leucocytosis. Given steroids, nebs with some improvement. RVP neg. No evidence of ACS/CHF.

## 2023-10-19 NOTE — PROGRESS NOTE ADULT - ASSESSMENT
84 y/o female with hx of CAD, HLD, COPD who was on home 02 in the past but states she didnt need it anymore and it was stopped in May. She resides at home with family and is normally independent but over the past 3-5 days she has been having increasing SOB, wheezing, and dry cough. Denies fever, chills, N/V, CP. No falls. She has been around multiple sick contacts at home with URI/Flu like symptoms. Denies any changes in medications, and no travel. In the ED noted to be wheezing diffusely and was hypoxic to 87% on RA minimal exertion. RVP neg, no infiltrates on CXR. Mild leucocytosis. Given steroids, nebs with some improvement. RVP neg. No evidence of ACS/CHF.     AECOPD:  -Pulse steroids, ATC nebs, incentive spirometer  -supplemental 02, wean off as tolerated   -Atypical coverage-Zithromax    CAD:  -No anginal symptoms  -Cont. BB, Statin, Aspirin  -Trop neg, EKG non acute changes    HLD:  -Statin     Hyperglycemia:  -A1c  -Accu checks and ISS while on steroids     DVT Prophylaxis -- Heparin SQ    Dispo: Home once stable.

## 2023-10-19 NOTE — PATIENT PROFILE ADULT - FALL HARM RISK - HARM RISK INTERVENTIONS

## 2023-10-19 NOTE — PATIENT PROFILE ADULT - FUNCTIONAL ASSESSMENT - DAILY ACTIVITY 6.
Quality 226: Preventive Care And Screening: Tobacco Use: Screening And Cessation Intervention: Patient screened for tobacco use and is an ex/non-smoker Detail Level: Zone 4 = No assist / stand by assistance

## 2023-10-20 LAB
A1C WITH ESTIMATED AVERAGE GLUCOSE RESULT: 6.5 % — HIGH (ref 4–5.6)
A1C WITH ESTIMATED AVERAGE GLUCOSE RESULT: 6.5 % — HIGH (ref 4–5.6)
ANION GAP SERPL CALC-SCNC: 15 MMOL/L — SIGNIFICANT CHANGE UP (ref 5–17)
ANION GAP SERPL CALC-SCNC: 15 MMOL/L — SIGNIFICANT CHANGE UP (ref 5–17)
BASOPHILS # BLD AUTO: 0.01 K/UL — SIGNIFICANT CHANGE UP (ref 0–0.2)
BASOPHILS # BLD AUTO: 0.01 K/UL — SIGNIFICANT CHANGE UP (ref 0–0.2)
BASOPHILS NFR BLD AUTO: 0.1 % — SIGNIFICANT CHANGE UP (ref 0–2)
BASOPHILS NFR BLD AUTO: 0.1 % — SIGNIFICANT CHANGE UP (ref 0–2)
BUN SERPL-MCNC: 30.3 MG/DL — HIGH (ref 8–20)
BUN SERPL-MCNC: 30.3 MG/DL — HIGH (ref 8–20)
CALCIUM SERPL-MCNC: 9.4 MG/DL — SIGNIFICANT CHANGE UP (ref 8.4–10.5)
CALCIUM SERPL-MCNC: 9.4 MG/DL — SIGNIFICANT CHANGE UP (ref 8.4–10.5)
CHLORIDE SERPL-SCNC: 103 MMOL/L — SIGNIFICANT CHANGE UP (ref 96–108)
CHLORIDE SERPL-SCNC: 103 MMOL/L — SIGNIFICANT CHANGE UP (ref 96–108)
CO2 SERPL-SCNC: 23 MMOL/L — SIGNIFICANT CHANGE UP (ref 22–29)
CO2 SERPL-SCNC: 23 MMOL/L — SIGNIFICANT CHANGE UP (ref 22–29)
CREAT SERPL-MCNC: 1.1 MG/DL — SIGNIFICANT CHANGE UP (ref 0.5–1.3)
CREAT SERPL-MCNC: 1.1 MG/DL — SIGNIFICANT CHANGE UP (ref 0.5–1.3)
EGFR: 50 ML/MIN/1.73M2 — LOW
EGFR: 50 ML/MIN/1.73M2 — LOW
EOSINOPHIL # BLD AUTO: 0 K/UL — SIGNIFICANT CHANGE UP (ref 0–0.5)
EOSINOPHIL # BLD AUTO: 0 K/UL — SIGNIFICANT CHANGE UP (ref 0–0.5)
EOSINOPHIL NFR BLD AUTO: 0 % — SIGNIFICANT CHANGE UP (ref 0–6)
EOSINOPHIL NFR BLD AUTO: 0 % — SIGNIFICANT CHANGE UP (ref 0–6)
ESTIMATED AVERAGE GLUCOSE: 140 MG/DL — HIGH (ref 68–114)
ESTIMATED AVERAGE GLUCOSE: 140 MG/DL — HIGH (ref 68–114)
GLUCOSE BLDC GLUCOMTR-MCNC: 188 MG/DL — HIGH (ref 70–99)
GLUCOSE BLDC GLUCOMTR-MCNC: 188 MG/DL — HIGH (ref 70–99)
GLUCOSE BLDC GLUCOMTR-MCNC: 190 MG/DL — HIGH (ref 70–99)
GLUCOSE BLDC GLUCOMTR-MCNC: 190 MG/DL — HIGH (ref 70–99)
GLUCOSE BLDC GLUCOMTR-MCNC: 200 MG/DL — HIGH (ref 70–99)
GLUCOSE BLDC GLUCOMTR-MCNC: 200 MG/DL — HIGH (ref 70–99)
GLUCOSE BLDC GLUCOMTR-MCNC: 277 MG/DL — HIGH (ref 70–99)
GLUCOSE BLDC GLUCOMTR-MCNC: 277 MG/DL — HIGH (ref 70–99)
GLUCOSE SERPL-MCNC: 238 MG/DL — HIGH (ref 70–99)
GLUCOSE SERPL-MCNC: 238 MG/DL — HIGH (ref 70–99)
HCT VFR BLD CALC: 36.1 % — SIGNIFICANT CHANGE UP (ref 34.5–45)
HCT VFR BLD CALC: 36.1 % — SIGNIFICANT CHANGE UP (ref 34.5–45)
HGB BLD-MCNC: 11.6 G/DL — SIGNIFICANT CHANGE UP (ref 11.5–15.5)
HGB BLD-MCNC: 11.6 G/DL — SIGNIFICANT CHANGE UP (ref 11.5–15.5)
IMM GRANULOCYTES NFR BLD AUTO: 1.2 % — HIGH (ref 0–0.9)
IMM GRANULOCYTES NFR BLD AUTO: 1.2 % — HIGH (ref 0–0.9)
LYMPHOCYTES # BLD AUTO: 0.87 K/UL — LOW (ref 1–3.3)
LYMPHOCYTES # BLD AUTO: 0.87 K/UL — LOW (ref 1–3.3)
LYMPHOCYTES # BLD AUTO: 6 % — LOW (ref 13–44)
LYMPHOCYTES # BLD AUTO: 6 % — LOW (ref 13–44)
MAGNESIUM SERPL-MCNC: 2.2 MG/DL — SIGNIFICANT CHANGE UP (ref 1.6–2.6)
MAGNESIUM SERPL-MCNC: 2.2 MG/DL — SIGNIFICANT CHANGE UP (ref 1.6–2.6)
MCHC RBC-ENTMCNC: 30.4 PG — SIGNIFICANT CHANGE UP (ref 27–34)
MCHC RBC-ENTMCNC: 30.4 PG — SIGNIFICANT CHANGE UP (ref 27–34)
MCHC RBC-ENTMCNC: 32.1 GM/DL — SIGNIFICANT CHANGE UP (ref 32–36)
MCHC RBC-ENTMCNC: 32.1 GM/DL — SIGNIFICANT CHANGE UP (ref 32–36)
MCV RBC AUTO: 94.8 FL — SIGNIFICANT CHANGE UP (ref 80–100)
MCV RBC AUTO: 94.8 FL — SIGNIFICANT CHANGE UP (ref 80–100)
MONOCYTES # BLD AUTO: 0.72 K/UL — SIGNIFICANT CHANGE UP (ref 0–0.9)
MONOCYTES # BLD AUTO: 0.72 K/UL — SIGNIFICANT CHANGE UP (ref 0–0.9)
MONOCYTES NFR BLD AUTO: 5 % — SIGNIFICANT CHANGE UP (ref 2–14)
MONOCYTES NFR BLD AUTO: 5 % — SIGNIFICANT CHANGE UP (ref 2–14)
NEUTROPHILS # BLD AUTO: 12.73 K/UL — HIGH (ref 1.8–7.4)
NEUTROPHILS # BLD AUTO: 12.73 K/UL — HIGH (ref 1.8–7.4)
NEUTROPHILS NFR BLD AUTO: 87.7 % — HIGH (ref 43–77)
NEUTROPHILS NFR BLD AUTO: 87.7 % — HIGH (ref 43–77)
PLATELET # BLD AUTO: 296 K/UL — SIGNIFICANT CHANGE UP (ref 150–400)
PLATELET # BLD AUTO: 296 K/UL — SIGNIFICANT CHANGE UP (ref 150–400)
POTASSIUM SERPL-MCNC: 4.3 MMOL/L — SIGNIFICANT CHANGE UP (ref 3.5–5.3)
POTASSIUM SERPL-MCNC: 4.3 MMOL/L — SIGNIFICANT CHANGE UP (ref 3.5–5.3)
POTASSIUM SERPL-SCNC: 4.3 MMOL/L — SIGNIFICANT CHANGE UP (ref 3.5–5.3)
POTASSIUM SERPL-SCNC: 4.3 MMOL/L — SIGNIFICANT CHANGE UP (ref 3.5–5.3)
RBC # BLD: 3.81 M/UL — SIGNIFICANT CHANGE UP (ref 3.8–5.2)
RBC # BLD: 3.81 M/UL — SIGNIFICANT CHANGE UP (ref 3.8–5.2)
RBC # FLD: 13.4 % — SIGNIFICANT CHANGE UP (ref 10.3–14.5)
RBC # FLD: 13.4 % — SIGNIFICANT CHANGE UP (ref 10.3–14.5)
SODIUM SERPL-SCNC: 141 MMOL/L — SIGNIFICANT CHANGE UP (ref 135–145)
SODIUM SERPL-SCNC: 141 MMOL/L — SIGNIFICANT CHANGE UP (ref 135–145)
WBC # BLD: 14.5 K/UL — HIGH (ref 3.8–10.5)
WBC # BLD: 14.5 K/UL — HIGH (ref 3.8–10.5)
WBC # FLD AUTO: 14.5 K/UL — HIGH (ref 3.8–10.5)
WBC # FLD AUTO: 14.5 K/UL — HIGH (ref 3.8–10.5)

## 2023-10-20 PROCEDURE — 99232 SBSQ HOSP IP/OBS MODERATE 35: CPT

## 2023-10-20 RX ORDER — INSULIN GLARGINE 100 [IU]/ML
10 INJECTION, SOLUTION SUBCUTANEOUS EVERY MORNING
Refills: 0 | Status: DISCONTINUED | OUTPATIENT
Start: 2023-10-20 | End: 2023-10-21

## 2023-10-20 RX ADMIN — BUDESONIDE AND FORMOTEROL FUMARATE DIHYDRATE 2 PUFF(S): 160; 4.5 AEROSOL RESPIRATORY (INHALATION) at 08:35

## 2023-10-20 RX ADMIN — Medication 81 MILLIGRAM(S): at 13:16

## 2023-10-20 RX ADMIN — INSULIN GLARGINE 10 UNIT(S): 100 INJECTION, SOLUTION SUBCUTANEOUS at 13:15

## 2023-10-20 RX ADMIN — HEPARIN SODIUM 5000 UNIT(S): 5000 INJECTION INTRAVENOUS; SUBCUTANEOUS at 05:07

## 2023-10-20 RX ADMIN — SODIUM CHLORIDE 3 MILLILITER(S): 9 INJECTION INTRAMUSCULAR; INTRAVENOUS; SUBCUTANEOUS at 05:06

## 2023-10-20 RX ADMIN — SODIUM CHLORIDE 3 MILLILITER(S): 9 INJECTION INTRAMUSCULAR; INTRAVENOUS; SUBCUTANEOUS at 13:04

## 2023-10-20 RX ADMIN — HEPARIN SODIUM 5000 UNIT(S): 5000 INJECTION INTRAVENOUS; SUBCUTANEOUS at 17:16

## 2023-10-20 RX ADMIN — Medication 25 MILLIGRAM(S): at 05:07

## 2023-10-20 RX ADMIN — Medication 1: at 09:38

## 2023-10-20 RX ADMIN — Medication 40 MILLIGRAM(S): at 17:17

## 2023-10-20 RX ADMIN — Medication 40 MILLIGRAM(S): at 05:07

## 2023-10-20 RX ADMIN — SODIUM CHLORIDE 3 MILLILITER(S): 9 INJECTION INTRAMUSCULAR; INTRAVENOUS; SUBCUTANEOUS at 22:00

## 2023-10-20 RX ADMIN — Medication 3 MILLILITER(S): at 08:35

## 2023-10-20 RX ADMIN — Medication 3 MILLILITER(S): at 22:33

## 2023-10-20 RX ADMIN — Medication 3: at 13:17

## 2023-10-20 RX ADMIN — Medication 1: at 17:13

## 2023-10-20 RX ADMIN — AZITHROMYCIN 255 MILLIGRAM(S): 500 TABLET, FILM COATED ORAL at 22:40

## 2023-10-20 RX ADMIN — PANTOPRAZOLE SODIUM 40 MILLIGRAM(S): 20 TABLET, DELAYED RELEASE ORAL at 13:16

## 2023-10-20 NOTE — PROGRESS NOTE ADULT - ASSESSMENT
84 y/o female with hx of CAD, HLD, COPD who was on home 02 in the past but states she didnt need it anymore and it was stopped in May. She resides at home with family and is normally independent but over the past 3-5 days she has been having increasing SOB, wheezing, and dry cough. Denies fever, chills, N/V, CP. No falls. She has been around multiple sick contacts at home with URI/Flu like symptoms. Denies any changes in medications, and no travel. In the ED noted to be wheezing diffusely and was hypoxic to 87% on RA minimal exertion. RVP neg, no infiltrates on CXR. Mild leucocytosis. Given steroids, nebs with some improvement. RVP neg. No evidence of ACS/CHF.     AECOPD:  -Pulse steroids, ATC nebs, incentive spirometer  -supplemental 02, wean off as tolerated   -Atypical coverage-Zithromax    CAD:  -No anginal symptoms  -Cont. BB, Statin, Aspirin  -Trop neg, EKG non acute changes    HLD:  -Statin     Prediabetes:  -A1c 6.5  -Accu checks and ISS while on steroids     DVT Prophylaxis -- Heparin SQ    Dispo: Home likely in 24 hours. Needs home oxygen for underlying COPD. She has been treated appropriately for Exacerbation however she desats on room air.

## 2023-10-21 ENCOUNTER — TRANSCRIPTION ENCOUNTER (OUTPATIENT)
Age: 83
End: 2023-10-21

## 2023-10-21 VITALS
RESPIRATION RATE: 18 BRPM | DIASTOLIC BLOOD PRESSURE: 65 MMHG | HEART RATE: 81 BPM | OXYGEN SATURATION: 95 % | TEMPERATURE: 98 F | SYSTOLIC BLOOD PRESSURE: 131 MMHG

## 2023-10-21 LAB
GLUCOSE BLDC GLUCOMTR-MCNC: 139 MG/DL — HIGH (ref 70–99)
GLUCOSE BLDC GLUCOMTR-MCNC: 139 MG/DL — HIGH (ref 70–99)
GLUCOSE BLDC GLUCOMTR-MCNC: 140 MG/DL — HIGH (ref 70–99)
GLUCOSE BLDC GLUCOMTR-MCNC: 140 MG/DL — HIGH (ref 70–99)
GLUCOSE BLDC GLUCOMTR-MCNC: 141 MG/DL — HIGH (ref 70–99)
GLUCOSE BLDC GLUCOMTR-MCNC: 141 MG/DL — HIGH (ref 70–99)

## 2023-10-21 PROCEDURE — 99239 HOSP IP/OBS DSCHRG MGMT >30: CPT

## 2023-10-21 RX ADMIN — Medication 40 MILLIGRAM(S): at 05:06

## 2023-10-21 RX ADMIN — SODIUM CHLORIDE 3 MILLILITER(S): 9 INJECTION INTRAMUSCULAR; INTRAVENOUS; SUBCUTANEOUS at 04:41

## 2023-10-21 RX ADMIN — HEPARIN SODIUM 5000 UNIT(S): 5000 INJECTION INTRAVENOUS; SUBCUTANEOUS at 16:27

## 2023-10-21 RX ADMIN — Medication 40 MILLIGRAM(S): at 16:27

## 2023-10-21 RX ADMIN — Medication 3 MILLILITER(S): at 15:13

## 2023-10-21 RX ADMIN — Medication 3 MILLILITER(S): at 08:35

## 2023-10-21 RX ADMIN — PANTOPRAZOLE SODIUM 40 MILLIGRAM(S): 20 TABLET, DELAYED RELEASE ORAL at 16:27

## 2023-10-21 RX ADMIN — Medication 81 MILLIGRAM(S): at 16:27

## 2023-10-21 RX ADMIN — HEPARIN SODIUM 5000 UNIT(S): 5000 INJECTION INTRAVENOUS; SUBCUTANEOUS at 05:06

## 2023-10-21 RX ADMIN — INSULIN GLARGINE 10 UNIT(S): 100 INJECTION, SOLUTION SUBCUTANEOUS at 08:18

## 2023-10-21 RX ADMIN — Medication 25 MILLIGRAM(S): at 05:07

## 2023-10-21 RX ADMIN — Medication 3 MILLILITER(S): at 04:01

## 2023-10-21 RX ADMIN — SODIUM CHLORIDE 3 MILLILITER(S): 9 INJECTION INTRAMUSCULAR; INTRAVENOUS; SUBCUTANEOUS at 12:09

## 2023-10-21 NOTE — PROGRESS NOTE ADULT - SUBJECTIVE AND OBJECTIVE BOX
Dyspnea    HPI:  84 y/o female with hx of CAD, HLD, COPD who was on home 02 in the past but states she didnt need it anymore and it was stopped in May. She resides at home with family and is normally independent but over the past 3-5 days she has been having increasing SOB, wheezing, and dry cough. Denies fever, chills, N/V, CP. No falls. She has been around multiple sick contacts at home with URI/Flu like symptoms. Denies any changes in medications, and no travel. In the ED noted to be wheezing diffusely and was hypoxic to 87% on RA minimal exertion. RVP neg, no infiltrates on CXR. Mild leucocytosis. Given steroids, nebs with some improvement. RVP neg. No evidence of ACS/CHF.  (19 Oct 2023 01:46)    Interval History:  Patient was seen and examined at bedside around 11:30 am.  Breathing is improving.   Denies chest pain, palpitations, headache, dizziness, visual symptoms, nausea, vomiting or abdominal pain.    ROS:  As per interval history otherwise unremarkable.    PHYSICAL EXAM:  Vital Signs   T(C): 36.9 (19 Oct 2023 15:39), Max: 37.1 (18 Oct 2023 22:54)  T(F): 98.5 (19 Oct 2023 15:39), Max: 98.8 (18 Oct 2023 22:54)  HR: 88 (19 Oct 2023 15:39) (73 - 94)  BP: 146/69 (19 Oct 2023 15:39) (105/65 - 147/71)  BP(mean): 96 (19 Oct 2023 01:46) (96 - 96)  RR: 18 (19 Oct 2023 15:39) (17 - 18)  SpO2: 93% (19 Oct 2023 15:39) (90% - 97%)  Parameters below as of 19 Oct 2023 15:39  Patient On (Oxygen Delivery Method): nasal cannula  O2 Flow (L/min): 3  General: Elderly female sitting in bed comfrotably. No acute distress  HEENT: EOMI. Clear conjunctivae. Moist mucus membrane  Neck: Supple.   Chest: Good air entry. No wheezing, rales or rhonchi.   Heart: Normal S1 & S2. RRR.   Abdomen: Non distended. Soft. Non-tender. + BS  Ext: 1+ pedal edema. No calf tenderness   Neuro: AAO x 3. No focal deficit. No speech disorder  Skin: Warm and Dry  Psychiatry: Normal mood and affect    LABS:  CAPILLARY BLOOD GLUCOSE  POCT Blood Glucose.: 278 mg/dL (19 Oct 2023 12:22)                      12.1   10.39 )-----------( 257      ( 19 Oct 2023 03:01 )             38.0     10-19    140  |  101  |  14.6  ----------------------------<  223<H>  4.2   |  24.0  |  0.87    Ca    9.5      19 Oct 2023 03:01  Phos  2.7     10-19  Mg     2.3     10-19    TPro  7.2  /  Alb  3.8  /  TBili  1.3  /  DBili  x   /  AST  20  /  ALT  17  /  AlkPhos  92  10-18    PT/INR - ( 18 Oct 2023 14:42 )   PT: 12.9 sec;   INR: 1.17 ratio       PTT - ( 18 Oct 2023 14:42 )  PTT:31.9 sec  Urinalysis Basic - ( 19 Oct 2023 03:01 )    Color: x / Appearance: x / SG: x / pH: x  Gluc: 223 mg/dL / Ketone: x  / Bili: x / Urobili: x   Blood: x / Protein: x / Nitrite: x   Leuk Esterase: x / RBC: x / WBC x   Sq Epi: x / Non Sq Epi: x / Bacteria: x    RADIOLOGY & ADDITIONAL STUDIES:  Reviewed     MEDICATIONS  (STANDING):  albuterol/ipratropium for Nebulization 3 milliLiter(s) Nebulizer every 6 hours  azithromycin  IVPB 500 milliGRAM(s) IV Intermittent every 24 hours  budesonide 160 MICROgram(s)/formoterol 4.5 MICROgram(s) Inhaler 2 Puff(s) Inhalation two times a day  dextrose 5%. 1000 milliLiter(s) (50 mL/Hr) IV Continuous <Continuous>  dextrose 5%. 1000 milliLiter(s) (100 mL/Hr) IV Continuous <Continuous>  dextrose 50% Injectable 25 Gram(s) IV Push once  dextrose 50% Injectable 12.5 Gram(s) IV Push once  dextrose 50% Injectable 25 Gram(s) IV Push once  glucagon  Injectable 1 milliGRAM(s) IntraMuscular once  heparin   Injectable 5000 Unit(s) SubCutaneous every 12 hours  insulin lispro (ADMELOG) corrective regimen sliding scale   SubCutaneous three times a day before meals  insulin lispro (ADMELOG) corrective regimen sliding scale   SubCutaneous at bedtime  methylPREDNISolone sodium succinate Injectable 40 milliGRAM(s) IV Push every 8 hours  metoprolol succinate ER 25 milliGRAM(s) Oral daily  pantoprazole    Tablet 40 milliGRAM(s) Oral daily  sodium chloride 0.9% lock flush 3 milliLiter(s) IV Push every 8 hours    MEDICATIONS  (PRN):  acetaminophen     Tablet .. 650 milliGRAM(s) Oral every 6 hours PRN Temp greater or equal to 38C (100.4F), Mild Pain (1 - 3)  aluminum hydroxide/magnesium hydroxide/simethicone Suspension 30 milliLiter(s) Oral every 4 hours PRN Dyspepsia  dextrose Oral Gel 15 Gram(s) Oral once PRN Blood Glucose LESS THAN 70 milliGRAM(s)/deciliter  melatonin 3 milliGRAM(s) Oral at bedtime PRN Insomnia  ondansetron Injectable 4 milliGRAM(s) IV Push every 8 hours PRN Nausea and/or Vomiting      
Dyspnea    HPI:  84 y/o female with hx of CAD, HLD, COPD who was on home 02 in the past but states she didnt need it anymore and it was stopped in May. She resides at home with family and is normally independent but over the past 3-5 days she has been having increasing SOB, wheezing, and dry cough. Denies fever, chills, N/V, CP. No falls. She has been around multiple sick contacts at home with URI/Flu like symptoms. Denies any changes in medications, and no travel. In the ED noted to be wheezing diffusely and was hypoxic to 87% on RA minimal exertion. RVP neg, no infiltrates on CXR. Mild leucocytosis. Given steroids, nebs with some improvement. RVP neg. No evidence of ACS/CHF.  (19 Oct 2023 01:46)    Interval History:  Patient was seen and examined at bedside around 9 am.  Doing well.   Breathing and cough are improving.   Denies chest pain or palpitations.  No fever.     ROS:  As per interval history otherwise unremarkable.    PHYSICAL EXAM:  Vital Signs   T(C): 36.8 (21 Oct 2023 15:31), Max: 36.8 (21 Oct 2023 15:31)  T(F): 98.3 (21 Oct 2023 15:31), Max: 98.3 (21 Oct 2023 15:31)  HR: 81 (21 Oct 2023 15:31) (64 - 81)  BP: 131/65 (21 Oct 2023 15:31) (105/58 - 131/65)  RR: 18 (21 Oct 2023 15:31) (17 - 18)  SpO2: 95% (21 Oct 2023 15:31) (94% - 99%)  Parameters below as of 21 Oct 2023 15:31  Patient On (Oxygen Delivery Method): room air  O2 Flow (L/min): 2  General: Elderly female sitting in bed comfortably. No acute distress  HEENT: EOMI. Clear conjunctivae. Moist mucus membrane  Neck: Supple.   Chest: Good air entry. No wheezing, rales or rhonchi.   Heart: Normal S1 & S2. RRR.   Abdomen: Non distended. Soft. Non-tender. + BS  Ext: 1+ pedal edema. No calf tenderness   Neuro: AAO x 3. No focal deficit. No speech disorder  Skin: Warm and Dry  Psychiatry: Normal mood and affect    LABS:  CAPILLARY BLOOD GLUCOSE  POCT Blood Glucose.: 141 mg/dL (21 Oct 2023 16:25)  POCT Blood Glucose.: 139 mg/dL (21 Oct 2023 11:57)  POCT Blood Glucose.: 140 mg/dL (21 Oct 2023 08:11)  POCT Blood Glucose.: 190 mg/dL (20 Oct 2023 21:24)  POCT Blood Glucose.: 188 mg/dL (20 Oct 2023 17:11)               11.6   14.50 )-----------( 296      ( 20 Oct 2023 03:02 )             36.1     10-20    141  |  103  |  30.3<H>  ----------------------------<  238<H>  4.3   |  23.0  |  1.10    Ca    9.4      20 Oct 2023 03:02  Phos  2.7     10-19  Mg     2.2     10-20    TPro  7.2  /  Alb  3.8  /  TBili  1.3  /  DBili  x   /  AST  20  /  ALT  17  /  AlkPhos  92  10-18    PT/INR - ( 18 Oct 2023 14:42 )   PT: 12.9 sec;   INR: 1.17 ratio         PTT - ( 18 Oct 2023 14:42 )  PTT:31.9 sec  CARDIAC MARKERS ( 18 Oct 2023 14:42 )  x     / <0.01 ng/mL / x     / x     / x        RADIOLOGY & ADDITIONAL STUDIES:  Reviewed     MEDICATIONS  (STANDING):  albuterol/ipratropium for Nebulization 3 milliLiter(s) Nebulizer every 6 hours  aspirin  chewable 81 milliGRAM(s) Oral daily  budesonide 160 MICROgram(s)/formoterol 4.5 MICROgram(s) Inhaler 2 Puff(s) Inhalation two times a day  dextrose 5%. 1000 milliLiter(s) (50 mL/Hr) IV Continuous <Continuous>  dextrose 5%. 1000 milliLiter(s) (100 mL/Hr) IV Continuous <Continuous>  dextrose 50% Injectable 25 Gram(s) IV Push once  dextrose 50% Injectable 25 Gram(s) IV Push once  dextrose 50% Injectable 12.5 Gram(s) IV Push once  glucagon  Injectable 1 milliGRAM(s) IntraMuscular once  heparin   Injectable 5000 Unit(s) SubCutaneous every 12 hours  influenza  Vaccine (HIGH DOSE) 0.7 milliLiter(s) IntraMuscular once  insulin glargine Injectable (LANTUS) 10 Unit(s) SubCutaneous every morning  insulin lispro (ADMELOG) corrective regimen sliding scale   SubCutaneous three times a day before meals  insulin lispro (ADMELOG) corrective regimen sliding scale   SubCutaneous at bedtime  methylPREDNISolone sodium succinate Injectable 40 milliGRAM(s) IV Push every 12 hours  metoprolol succinate ER 25 milliGRAM(s) Oral daily  pantoprazole    Tablet 40 milliGRAM(s) Oral daily  sodium chloride 0.9% lock flush 3 milliLiter(s) IV Push every 8 hours    MEDICATIONS  (PRN):  acetaminophen     Tablet .. 650 milliGRAM(s) Oral every 6 hours PRN Temp greater or equal to 38C (100.4F), Mild Pain (1 - 3)  aluminum hydroxide/magnesium hydroxide/simethicone Suspension 30 milliLiter(s) Oral every 4 hours PRN Dyspepsia  dextrose Oral Gel 15 Gram(s) Oral once PRN Blood Glucose LESS THAN 70 milliGRAM(s)/deciliter  melatonin 3 milliGRAM(s) Oral at bedtime PRN Insomnia  ondansetron Injectable 4 milliGRAM(s) IV Push every 8 hours PRN Nausea and/or Vomiting          
Dyspnea    HPI:  84 y/o female with hx of CAD, HLD, COPD who was on home 02 in the past but states she didnt need it anymore and it was stopped in May. She resides at home with family and is normally independent but over the past 3-5 days she has been having increasing SOB, wheezing, and dry cough. Denies fever, chills, N/V, CP. No falls. She has been around multiple sick contacts at home with URI/Flu like symptoms. Denies any changes in medications, and no travel. In the ED noted to be wheezing diffusely and was hypoxic to 87% on RA minimal exertion. RVP neg, no infiltrates on CXR. Mild leucocytosis. Given steroids, nebs with some improvement. RVP neg. No evidence of ACS/CHF.  (19 Oct 2023 01:46)    Interval History:  Patient was seen and examined at bedside around 10:30 am.  Feels better.   Breathing and cough are improving.   Denies chest pain or palpitations.    ROS:  As per interval history otherwise unremarkable.    PHYSICAL EXAM:  Vital Signs   T(C): 36.5 (20 Oct 2023 11:39), Max: 36.9 (19 Oct 2023 15:39)  T(F): 97.7 (20 Oct 2023 11:39), Max: 98.5 (19 Oct 2023 15:39)  HR: 82 (20 Oct 2023 11:39) (72 - 88)  BP: 118/68 (20 Oct 2023 11:39) (107/74 - 146/69)  RR: 18 (20 Oct 2023 11:39) (18 - 18)  SpO2: 93% (20 Oct 2023 11:39) (92% - 98%)  Parameters below as of 20 Oct 2023 11:39  Patient On (Oxygen Delivery Method): nasal cannula  O2 Flow (L/min): 3  General: Elderly female sitting in bed comfortably. No acute distress  HEENT: EOMI. Clear conjunctivae. Moist mucus membrane  Neck: Supple.   Chest: Good air entry. No wheezing, rales or rhonchi.   Heart: Normal S1 & S2. RRR.   Abdomen: Non distended. Soft. Non-tender. + BS  Ext: 1+ pedal edema. No calf tenderness   Neuro: AAO x 3. No focal deficit. No speech disorder  Skin: Warm and Dry  Psychiatry: Normal mood and affect    LABS:  CAPILLARY BLOOD GLUCOSE  POCT Blood Glucose.: 200 mg/dL (20 Oct 2023 09:16)  POCT Blood Glucose.: 271 mg/dL (19 Oct 2023 21:12)  POCT Blood Glucose.: 258 mg/dL (19 Oct 2023 17:40)  POCT Blood Glucose.: 278 mg/dL (19 Oct 2023 12:22)                      11.6   14.50 )-----------( 296      ( 20 Oct 2023 03:02 )             36.1     10-20    141  |  103  |  30.3<H>  ----------------------------<  238<H>  4.3   |  23.0  |  1.10    Ca    9.4      20 Oct 2023 03:02  Phos  2.7     10-19  Mg     2.2     10-20    TPro  7.2  /  Alb  3.8  /  TBili  1.3  /  DBili  x   /  AST  20  /  ALT  17  /  AlkPhos  92  10-18    PT/INR - ( 18 Oct 2023 14:42 )   PT: 12.9 sec;   INR: 1.17 ratio         PTT - ( 18 Oct 2023 14:42 )  PTT:31.9 sec  CARDIAC MARKERS ( 18 Oct 2023 14:42 )  x     / <0.01 ng/mL / x     / x     / x        RADIOLOGY & ADDITIONAL STUDIES:  Reviewed     MEDICATIONS  (STANDING):  albuterol/ipratropium for Nebulization 3 milliLiter(s) Nebulizer every 6 hours  aspirin  chewable 81 milliGRAM(s) Oral daily  azithromycin  IVPB 500 milliGRAM(s) IV Intermittent every 24 hours  budesonide 160 MICROgram(s)/formoterol 4.5 MICROgram(s) Inhaler 2 Puff(s) Inhalation two times a day  dextrose 5%. 1000 milliLiter(s) (50 mL/Hr) IV Continuous <Continuous>  dextrose 5%. 1000 milliLiter(s) (100 mL/Hr) IV Continuous <Continuous>  dextrose 50% Injectable 25 Gram(s) IV Push once  dextrose 50% Injectable 12.5 Gram(s) IV Push once  dextrose 50% Injectable 25 Gram(s) IV Push once  glucagon  Injectable 1 milliGRAM(s) IntraMuscular once  heparin   Injectable 5000 Unit(s) SubCutaneous every 12 hours  influenza  Vaccine (HIGH DOSE) 0.7 milliLiter(s) IntraMuscular once  insulin lispro (ADMELOG) corrective regimen sliding scale   SubCutaneous three times a day before meals  insulin lispro (ADMELOG) corrective regimen sliding scale   SubCutaneous at bedtime  methylPREDNISolone sodium succinate Injectable 40 milliGRAM(s) IV Push every 12 hours  metoprolol succinate ER 25 milliGRAM(s) Oral daily  pantoprazole    Tablet 40 milliGRAM(s) Oral daily  sodium chloride 0.9% lock flush 3 milliLiter(s) IV Push every 8 hours    MEDICATIONS  (PRN):  acetaminophen     Tablet .. 650 milliGRAM(s) Oral every 6 hours PRN Temp greater or equal to 38C (100.4F), Mild Pain (1 - 3)  aluminum hydroxide/magnesium hydroxide/simethicone Suspension 30 milliLiter(s) Oral every 4 hours PRN Dyspepsia  dextrose Oral Gel 15 Gram(s) Oral once PRN Blood Glucose LESS THAN 70 milliGRAM(s)/deciliter  melatonin 3 milliGRAM(s) Oral at bedtime PRN Insomnia  ondansetron Injectable 4 milliGRAM(s) IV Push every 8 hours PRN Nausea and/or Vomiting

## 2023-10-21 NOTE — PROGRESS NOTE ADULT - ASSESSMENT
82 y/o female with hx of CAD, HLD, COPD who was on home 02 in the past but states she didnt need it anymore and it was stopped in May. She resides at home with family and is normally independent but over the past 3-5 days she has been having increasing SOB, wheezing, and dry cough. Denies fever, chills, N/V, CP. No falls. She has been around multiple sick contacts at home with URI/Flu like symptoms. Denies any changes in medications, and no travel. In the ED noted to be wheezing diffusely and was hypoxic to 87% on RA minimal exertion. RVP neg, no infiltrates on CXR. Mild leucocytosis. Given steroids, nebs with some improvement. RVP neg. No evidence of ACS/CHF.     AECOPD:  -Pulse steroids, ATC nebs, incentive spirometer  -supplemental 02, wean off as tolerated   -Atypical coverage- s/p Zithromax    CAD:  -No anginal symptoms  -Cont. BB, Statin, Aspirin  -Trop neg, EKG non acute changes    HLD:  -Statin     Prediabetes:  -A1c 6.5  -Accu checks and ISS while on steroids     DVT Prophylaxis -- Heparin SQ    Dispo: Home once home oxygen is approved.   Needs home oxygen for underlying COPD. She has been treated appropriately for Exacerbation however she desats on room air.    84 y/o female with hx of CAD, HLD, COPD who was on home 02 in the past but states she didnt need it anymore and it was stopped in May. She resides at home with family and is normally independent but over the past 3-5 days she has been having increasing SOB, wheezing, and dry cough. Denies fever, chills, N/V, CP. No falls. She has been around multiple sick contacts at home with URI/Flu like symptoms. Denies any changes in medications, and no travel. In the ED noted to be wheezing diffusely and was hypoxic to 87% on RA minimal exertion. RVP neg, no infiltrates on CXR. Mild leucocytosis. Given steroids, nebs with some improvement. RVP neg. No evidence of ACS/CHF.     AECOPD:  -Pulse steroids, ATC nebs, incentive spirometer  -supplemental 02, wean off as tolerated   -Atypical coverage- s/p Zithromax    CAD:  -No anginal symptoms  -Cont. BB, Statin, Aspirin  -Trop neg, EKG non acute changes    HLD:  -Statin     Prediabetes:  -A1c 6.5  -Accu checks and ISS while on steroids   -Added Lantus 10 units daily     DVT Prophylaxis -- Heparin SQ    Dispo: Home once home oxygen is approved.   Needs home oxygen for underlying COPD. She has been treated appropriately for Exacerbation however she desats on room air.

## 2023-10-21 NOTE — DISCHARGE NOTE PROVIDER - HOSPITAL COURSE
82 y/o female with hx of CAD, HLD, COPD who was on home 02 in the past but states she didnt need it anymore and it was stopped in May. She resides at home with family and is normally independent but over the past 3-5 days she has been having increasing SOB, wheezing, and dry cough. Denies fever, chills, N/V, CP. No falls. She has been around multiple sick contacts at home with URI/Flu like symptoms. Denies any changes in medications, and no travel. In the ED noted to be wheezing diffusely and was hypoxic to 87% on RA minimal exertion. RVP neg, no infiltrates on CXR. Mild leucocytosis. Given steroids, nebs with some improvement. RVP neg. No evidence of ACS/CHF.   Admitted with Acute Exacerbation of COPD. She was started on Nebs and IV Steroids. Responded very well. Symptoms improving. She is requiring supplemental oxygen and will be discharged home with home oxygen.   During hospitalization, she was noted to have Prediabetes -- advised for lifestyle modification and outpatient follow up with PMD.  She is feeling better and is stable for discharge.

## 2023-10-21 NOTE — DISCHARGE NOTE PROVIDER - CARE PROVIDERS DIRECT ADDRESSES
,DirectAddress_Unknown,[email or web address]

## 2023-10-21 NOTE — DISCHARGE NOTE PROVIDER - PROVIDER TOKENS
PROVIDER:[TOKEN:[[id number]],FOLLOWUP:[1 week],ESTABLISHEDPATIENT:[T]],PROVIDER:[TOKEN:[[id number]],FOLLOWUP:[1 week],ESTABLISHEDPATIENT:[T]]

## 2023-10-21 NOTE — DISCHARGE NOTE NURSING/CASE MANAGEMENT/SOCIAL WORK - PATIENT PORTAL LINK FT
You can access the FollowMyHealth Patient Portal offered by [facility] by registering at the following website: [email or web address]. By joining [email or web address]’s FollowMyHealth portal, you will also be able to view your health information using other applications (apps) compatible with our system.

## 2023-10-21 NOTE — DISCHARGE NOTE PROVIDER - NSDCMRMEDTOKEN_GEN_ALL_CORE_FT
alendronate 35 mg oral tablet: 1 tab(s) orally once a week  aspirin 81 mg oral delayed release tablet: 1 tab(s) orally once a day  atorvastatin 20 mg oral tablet: 1 tab(s) orally once a day (at bedtime)  Breztri Aerosphere 160 mcg-9 mcg-4.8 mcg/inh inhalation aerosol: 2 puff(s) inhaled once a day  Metoprolol Succinate ER 25 mg oral tablet, extended release: 1 tab(s) orally once a day  omeprazole 20 mg oral delayed release capsule: 1 cap(s) orally once a day  Vitamin D3 25 mcg (1000 intl units) oral tablet: 1 tab(s) orally once a day   alendronate 35 mg oral tablet: 1 tab(s) orally once a week  aspirin 81 mg oral delayed release tablet: 1 tab(s) orally once a day  atorvastatin 20 mg oral tablet: 1 tab(s) orally once a day (at bedtime)  Breztri Aerosphere 160 mcg-9 mcg-4.8 mcg/inh inhalation aerosol: 2 puff(s) inhaled once a day  Metoprolol Succinate ER 25 mg oral tablet, extended release: 1 tab(s) orally once a day  omeprazole 20 mg oral delayed release capsule: 1 cap(s) orally once a day  predniSONE 10 mg oral tablet: 1 tab(s) orally once a day 4 tabs for 2 days then 3 tabs for 2 days then 2 tabs for 2 days then 1 tab for 2 days. Starting 10/22/23.  Vitamin D3 25 mcg (1000 intl units) oral tablet: 1 tab(s) orally once a day

## 2023-10-21 NOTE — DISCHARGE NOTE PROVIDER - NSDCCPCAREPLAN_GEN_ALL_CORE_FT
PRINCIPAL DISCHARGE DIAGNOSIS  Diagnosis: COPD exacerbation  Assessment and Plan of Treatment: Continue medications as prescribed.  Follow up with PMD / Pulmonologist.      SECONDARY DISCHARGE DIAGNOSES  Diagnosis: Prediabetes  Assessment and Plan of Treatment: Lifestyle modification.  Follow up with PMD in 1 week.

## 2023-10-21 NOTE — DISCHARGE NOTE PROVIDER - ATTENDING DISCHARGE PHYSICAL EXAMINATION:
Vital Signs   T(C): 36.5 (21 Oct 2023 10:47), Max: 36.8 (20 Oct 2023 15:34)  T(F): 97.7 (21 Oct 2023 10:47), Max: 98.3 (20 Oct 2023 15:34)  HR: 80 (21 Oct 2023 10:47) (64 - 80)  BP: 105/58 (21 Oct 2023 10:47) (105/58 - 124/66)  RR: 18 (21 Oct 2023 10:47) (17 - 18)  SpO2: 94% (21 Oct 2023 10:47) (93% - 99%)  Parameters below as of 21 Oct 2023 10:47  Patient On (Oxygen Delivery Method): nasal cannula  General: Elderly female sitting in bed comfortably. No acute distress  HEENT: EOMI. Clear conjunctivae. Moist mucus membrane  Neck: Supple.   Chest: Good air entry. No wheezing, rales or rhonchi.   Heart: Normal S1 & S2. RRR.   Abdomen: Non distended. Soft. Non-tender. + BS  Ext: 1+ pedal edema. No calf tenderness   Neuro: AAO x 3. No focal deficit. No speech disorder  Skin: Warm and Dry  Psychiatry: Normal mood and affect

## 2023-10-21 NOTE — DISCHARGE NOTE PROVIDER - CARE PROVIDER_API CALL
Mina Garces.  Family Medicine  [address] [place] [address]  Phone: [phone]  Fax: [phone]  Established Patient  Follow Up Time: 1 week    Samuel Baxter  Sleep Medicine  [facility], [address] [address]  Phone: [phone]  Fax: [phone]  Established Patient  Follow Up Time: 1 week

## 2023-11-13 PROCEDURE — 80048 BASIC METABOLIC PNL TOTAL CA: CPT

## 2023-11-13 PROCEDURE — 84100 ASSAY OF PHOSPHORUS: CPT

## 2023-11-13 PROCEDURE — 85027 COMPLETE CBC AUTOMATED: CPT

## 2023-11-13 PROCEDURE — 99285 EMERGENCY DEPT VISIT HI MDM: CPT

## 2023-11-13 PROCEDURE — 83880 ASSAY OF NATRIURETIC PEPTIDE: CPT

## 2023-11-13 PROCEDURE — 80053 COMPREHEN METABOLIC PANEL: CPT

## 2023-11-13 PROCEDURE — 36415 COLL VENOUS BLD VENIPUNCTURE: CPT

## 2023-11-13 PROCEDURE — 96365 THER/PROPH/DIAG IV INF INIT: CPT

## 2023-11-13 PROCEDURE — 71045 X-RAY EXAM CHEST 1 VIEW: CPT

## 2023-11-13 PROCEDURE — 93005 ELECTROCARDIOGRAM TRACING: CPT

## 2023-11-13 PROCEDURE — 85730 THROMBOPLASTIN TIME PARTIAL: CPT

## 2023-11-13 PROCEDURE — 82962 GLUCOSE BLOOD TEST: CPT

## 2023-11-13 PROCEDURE — 83036 HEMOGLOBIN GLYCOSYLATED A1C: CPT

## 2023-11-13 PROCEDURE — 87637 SARSCOV2&INF A&B&RSV AMP PRB: CPT

## 2023-11-13 PROCEDURE — 83735 ASSAY OF MAGNESIUM: CPT

## 2023-11-13 PROCEDURE — 94640 AIRWAY INHALATION TREATMENT: CPT

## 2023-11-13 PROCEDURE — 85610 PROTHROMBIN TIME: CPT

## 2023-11-13 PROCEDURE — 85025 COMPLETE CBC W/AUTO DIFF WBC: CPT

## 2023-11-13 PROCEDURE — 94760 N-INVAS EAR/PLS OXIMETRY 1: CPT

## 2023-11-13 PROCEDURE — 84484 ASSAY OF TROPONIN QUANT: CPT

## 2023-11-16 ENCOUNTER — APPOINTMENT (OUTPATIENT)
Dept: PULMONOLOGY | Facility: CLINIC | Age: 83
End: 2023-11-16
Payer: MEDICARE

## 2023-11-16 VITALS
DIASTOLIC BLOOD PRESSURE: 68 MMHG | HEART RATE: 78 BPM | SYSTOLIC BLOOD PRESSURE: 126 MMHG | HEIGHT: 63 IN | WEIGHT: 181 LBS | BODY MASS INDEX: 32.07 KG/M2 | OXYGEN SATURATION: 97 % | RESPIRATION RATE: 16 BRPM

## 2023-11-16 PROCEDURE — 99214 OFFICE O/P EST MOD 30 MIN: CPT

## 2023-11-17 NOTE — ASU DISCHARGE PLAN (ADULT/PEDIATRIC). - DIET
Echo came back within normal limits did show mild thickening/stiffening of heart muscles which can be related to age.  There were some heart valves that showed some backflow of blood (aortic/mitral regurgitation).  I would recommend monitoring his symptoms if he starts having any shortness a breath leg swelling, chest pain dizziness then he should let his primary care doctor know about the same.    DIRK Soria, echo was done as patient had central sleep apnea with Cheyne-Walker respiration.  NT proBNP came back within normal limits.  Please let the patient know if you have any further recommendation based on his echo scan.  Thanks  Thanks   no change

## 2024-03-21 NOTE — HISTORY OF PRESENT ILLNESS
[FreeTextEntry1] : 2019 78 year old female former > 100 pack year smoker (DCj in 2009) seen for dyspnea and emphysema Not interested in rehab No recent imaging  3/7/23 Hospital Course:  Discharge Date	14-Feb-2023  Admission Date	09-Feb-2023 00:05  Reason for Admission	Acute on chronic hypoxemic respiratory failure due to PNA,  COPD exacerbation  Hospital Course	  82yoF hx former heavy smoker, non-obstructive CAD, COPD not on home O2, HLD,  presenting with several days of increased baseline exertional dyspnea  associated with worsening productive cough, generalized weakness and malaise.  Pt reports chronic unchanged bilateral leg edema which she attributes to venous  insufficiency.  She denies any fevers, chills, chest pain.  Pt went to her  outpatient cardiologist office regarding her above symptoms, was found to be  hypoxic satting 84% on room air, was placed on 3L nasal cannula and sent to  hospital.  On admission, pt maintained on nasal cannula and had CTA that was  negative for PE but showed R-sided opacities concerning for PNA and incidental  finding of spiculated DG nodule.   Patient was admitted and treated for COPD exacerbation secondary to pneumonia.  PAtient clinically improved back to baseline but still required O2. Arranged  for home oxygen.  Discharge Medications	alendronate 35 mg oral tablet: 1 tab(s) orally once a  week  aspirin 81 mg oral delayed release tablet: 1 tab(s) orally once a day  atorvastatin 20 mg oral tablet: 1 tab(s) orally once a day (at bedtime)  cefpodoxime 200 mg oral tablet: 1 tab(s) orally 2 times a day  Metoprolol Succinate ER 25 mg oral tablet, extended release: 1 tab(s) orally  once a day  omeprazole 20 mg oral delayed release capsule: 1 cap(s) orally once a day  predniSONE 10 mg oral tablet: 4 tab(s) orally once a day for 2 days then 3 tabs  daily for 2 days then 2 tabs daily for 2 days then 1 tab daily for 2 days then  stop.  Vitamin D3 25 mcg (1000 intl units) oral tablet: 1 tab(s) orally once a day  Wixela Inhub 250 mcg-50 mcg inhalation powder: 1 puff(s) inhaled 2 times a day   Diastolic HF Moderate COPD 1.4 cm spiculated nodule DG  100 pack year smoker - DC in 2009 Doing well clinically   3/15/23 PET CT - DG spiculated mass not FDG avid Spoke with patient  PFT on 4/11 and FU on 4/13/23: Probable FU CT in June - Defer Bx for now   5/24/23 Compliant with and benefiting from nasal oxygen - principally needs it for exertion on her eliptical   10/11/23 Compliant with and benefiting from nasal oxygen - principally needs it for exertion on her eliptical  No cough, sputum, hemoptysis, sweats, dyspnea or weight loss  11/16/23 I reviewed all recent notes, orders, lab results and images for 15 minutes on all available platforms (including [employer], [employer], [employer], and [employer] Epic prior to this visit and made notes.  Hospital Course  Discharge Date 21-Oct-2023  Admission Date 18-Oct-2023 17:41  Reason for Admission COPD exacerbation  Hospital Course   82 y/o female with hx of CAD, HLD, COPD who was on home 02 in the past but  states she didnt need it anymore and it was stopped in May. She resides at home  with family and is normally independent but over the past 3-5 days she has been  having increasing SOB, wheezing, and dry cough. Denies fever, chills, N/V, CP.  No falls. She has been around multiple sick contacts at home with URI/Flu like  symptoms. Denies any changes in medications, and no travel. In the ED noted to  be wheezing diffusely and was hypoxic to 87% on RA minimal exertion. RVP neg,  no infiltrates on CXR. Mild leucocytosis. Given steroids, nebs with some  improvement. RVP neg. No evidence of ACS/CHF.  Admitted with Acute Exacerbation of COPD. She was started on Nebs and IV  Steroids. Responded very well. Symptoms improving. She is requiring  supplemental oxygen and will be discharged home with home oxygen.  During hospitalization, she was noted to have Prediabetes -- advised for  lifestyle modification and outpatient follow up with PMD.  Discharge Medications alendronate 35 mg oral tablet: 1 tab(s) orally once a  week  aspirin 81 mg oral delayed release tablet: 1 tab(s) orally once a day  atorvastatin 20 mg oral tablet: 1 tab(s) orally once a day (at bedtime)  Breztri Aerosphere 160 mcg-9 mcg-4.8 mcg/inh inhalation aerosol: 2 puff(s)  inhaled once a day  Metoprolol Succinate ER 25 mg oral tablet, extended release: 1 tab(s) orally  once a day  omeprazole 20 mg oral delayed release capsule: 1 cap(s) orally once a day  predniSONE 10 mg oral tablet: 1 tab(s) orally once a day 4 tabs for 2 days then  3 tabs for 2 days then 2 tabs for 2 days then 1 tab for 2 days. Starting  10/22/23.  Vitamin D3 25 mcg (1000 intl units) oral tablet: 1 tab(s) orally once a day

## 2024-03-21 NOTE — DISCUSSION/SUMMARY
[FreeTextEntry1] : Assess  Moderate COPD no post viral induced exacerbation Using LABA/ICS On 02 for sleep and exertion - OK on RA at rest in the past Currently Compliant with and benefiting from POC 02 when out and stationary concentrator at home around the clock Post pneumonia and viral AECOPD Suspicious spiculated DG mass - non-avid  Plan  Pulsed 02 around the clock Breztri via spacer FU CT March of 2024 Clinical FU April of 2024

## 2024-03-21 NOTE — RESULTS/DATA
[TextEntry] : CT Chest at [facility] on 9/13/23: 1.4 cm irregular nodule in left apex. Moderate emphysema. CTA in February and PET CT in March were via [facility] CT AP 3/14/24: Stabel 1.4 cm scar like opacity DG. Recommend 6 month FU

## 2024-03-22 ENCOUNTER — NON-APPOINTMENT (OUTPATIENT)
Age: 84
End: 2024-03-22

## 2024-04-11 ENCOUNTER — APPOINTMENT (OUTPATIENT)
Dept: PULMONOLOGY | Facility: CLINIC | Age: 84
End: 2024-04-11
Payer: MEDICARE

## 2024-04-11 VITALS
RESPIRATION RATE: 16 BRPM | OXYGEN SATURATION: 95 % | SYSTOLIC BLOOD PRESSURE: 130 MMHG | DIASTOLIC BLOOD PRESSURE: 70 MMHG | HEART RATE: 69 BPM

## 2024-04-11 VITALS — HEIGHT: 63 IN | WEIGHT: 182 LBS | BODY MASS INDEX: 32.25 KG/M2

## 2024-04-11 DIAGNOSIS — Z87.891 PERSONAL HISTORY OF NICOTINE DEPENDENCE: ICD-10-CM

## 2024-04-11 DIAGNOSIS — R91.1 SOLITARY PULMONARY NODULE: ICD-10-CM

## 2024-04-11 DIAGNOSIS — K21.9 GASTRO-ESOPHAGEAL REFLUX DISEASE W/OUT ESOPHAGITIS: ICD-10-CM

## 2024-04-11 DIAGNOSIS — J44.9 CHRONIC OBSTRUCTIVE PULMONARY DISEASE, UNSPECIFIED: ICD-10-CM

## 2024-04-11 PROCEDURE — 99213 OFFICE O/P EST LOW 20 MIN: CPT

## 2024-04-11 NOTE — HISTORY OF PRESENT ILLNESS
[FreeTextEntry1] : 2019 78 year old female former > 100 pack year smoker (DCj in 2009) seen for dyspnea and emphysema Not interested in rehab No recent imaging  3/7/23 Hospital Course:  Discharge Date	14-Feb-2023  Admission Date	09-Feb-2023 00:05  Reason for Admission	Acute on chronic hypoxemic respiratory failure due to PNA,  COPD exacerbation  Hospital Course	  82yoF hx former heavy smoker, non-obstructive CAD, COPD not on home O2, HLD,  presenting with several days of increased baseline exertional dyspnea  associated with worsening productive cough, generalized weakness and malaise.  Pt reports chronic unchanged bilateral leg edema which she attributes to venous  insufficiency.  She denies any fevers, chills, chest pain.  Pt went to her  outpatient cardiologist office regarding her above symptoms, was found to be  hypoxic satting 84% on room air, was placed on 3L nasal cannula and sent to  hospital.  On admission, pt maintained on nasal cannula and had CTA that was  negative for PE but showed R-sided opacities concerning for PNA and incidental  finding of spiculated DG nodule.   Patient was admitted and treated for COPD exacerbation secondary to pneumonia.  PAtient clinically improved back to baseline but still required O2. Arranged  for home oxygen.  Discharge Medications	alendronate 35 mg oral tablet: 1 tab(s) orally once a  week  aspirin 81 mg oral delayed release tablet: 1 tab(s) orally once a day  atorvastatin 20 mg oral tablet: 1 tab(s) orally once a day (at bedtime)  cefpodoxime 200 mg oral tablet: 1 tab(s) orally 2 times a day  Metoprolol Succinate ER 25 mg oral tablet, extended release: 1 tab(s) orally  once a day  omeprazole 20 mg oral delayed release capsule: 1 cap(s) orally once a day  predniSONE 10 mg oral tablet: 4 tab(s) orally once a day for 2 days then 3 tabs  daily for 2 days then 2 tabs daily for 2 days then 1 tab daily for 2 days then  stop.  Vitamin D3 25 mcg (1000 intl units) oral tablet: 1 tab(s) orally once a day  Wixela Inhub 250 mcg-50 mcg inhalation powder: 1 puff(s) inhaled 2 times a day   Diastolic HF Moderate COPD 1.4 cm spiculated nodule DG  100 pack year smoker - DC in 2009 Doing well clinically   3/15/23 PET CT - DG spiculated mass not FDG avid Spoke with patient  PFT on 4/11 and FU on 4/13/23: Probable FU CT in June - Defer Bx for now   5/24/23 Compliant with and benefiting from nasal oxygen - principally needs it for exertion on her eliptical   10/11/23 Compliant with and benefiting from nasal oxygen - principally needs it for exertion on her eliptical  No cough, sputum, hemoptysis, sweats, dyspnea or weight loss  11/16/23 I reviewed all recent notes, orders, lab results and images for 15 minutes on all available platforms (including [employer], [employer], [employer], and [employer] Epic prior to this visit and made notes.  Hospital Course  Discharge Date 21-Oct-2023  Admission Date 18-Oct-2023 17:41  Reason for Admission COPD exacerbation  Hospital Course   82 y/o female with hx of CAD, HLD, COPD who was on home 02 in the past but  states she didnt need it anymore and it was stopped in May. She resides at home  with family and is normally independent but over the past 3-5 days she has been  having increasing SOB, wheezing, and dry cough. Denies fever, chills, N/V, CP.  No falls. She has been around multiple sick contacts at home with URI/Flu like  symptoms. Denies any changes in medications, and no travel. In the ED noted to  be wheezing diffusely and was hypoxic to 87% on RA minimal exertion. RVP neg,  no infiltrates on CXR. Mild leucocytosis. Given steroids, nebs with some  improvement. RVP neg. No evidence of ACS/CHF.  Admitted with Acute Exacerbation of COPD. She was started on Nebs and IV  Steroids. Responded very well. Symptoms improving. She is requiring  supplemental oxygen and will be discharged home with home oxygen.  During hospitalization, she was noted to have Prediabetes -- advised for  lifestyle modification and outpatient follow up with PMD.  Discharge Medications alendronate 35 mg oral tablet: 1 tab(s) orally once a  week  aspirin 81 mg oral delayed release tablet: 1 tab(s) orally once a day  atorvastatin 20 mg oral tablet: 1 tab(s) orally once a day (at bedtime)  Breztri Aerosphere 160 mcg-9 mcg-4.8 mcg/inh inhalation aerosol: 2 puff(s)  inhaled once a day  Metoprolol Succinate ER 25 mg oral tablet, extended release: 1 tab(s) orally  once a day  omeprazole 20 mg oral delayed release capsule: 1 cap(s) orally once a day  predniSONE 10 mg oral tablet: 1 tab(s) orally once a day 4 tabs for 2 days then  3 tabs for 2 days then 2 tabs for 2 days then 1 tab for 2 days. Starting  10/22/23.  Vitamin D3 25 mcg (1000 intl units) oral tablet: 1 tab(s) orally once a day  4/11/24 Doing OK on pulsed 02 around the clock Compliant with and benefiting from nasal 02 02 sats are good at home No cp, cough, sputum, orthopnea, edema

## 2024-04-11 NOTE — PHYSICAL EXAM
[Normal Oropharynx] : normal oropharynx [Neck Appearance] : the appearance of the neck was normal [] : the neck was supple [Jugular Venous Distention Increased] : there was no jugular-venous distention [Thyroid Diffuse Enlargement] : the thyroid was not enlarged [Heart Sounds] : normal S1 and S2 [Arterial Pulses Normal] : the arterial pulses were normal [Edema] : no peripheral edema present [Respiration, Rhythm And Depth] : normal respiratory rhythm and effort [Auscultation Breath Sounds / Voice Sounds] : lungs were clear to auscultation bilaterally [Lungs Percussion] : the lungs were normal to percussion [Nail Clubbing] : no clubbing of the fingernails [Cyanosis, Localized] : no localized cyanosis [FreeTextEntry1] : Overweight

## 2024-04-11 NOTE — DISCUSSION/SUMMARY
[FreeTextEntry1] : Assess  Moderate COPD no post viral induced exacerbation Using LABA/ICS On 02 for sleep and exertion - OK on RA at rest in the past Currently Compliant with and benefiting from POC 02 when out and stationary concentrator at home around the clock Suspicious spiculated DG mass - non-avid - stable  Plan  Pulsed 02 around the clock Breztri via spacer FU CT March of 2025 Clinical FU in 6 months

## 2024-04-11 NOTE — REASON FOR VISIT
[Follow-Up - From Hospitalization] : a follow-up visit after a recent hospitalization [Initial Evaluation] : an initial evaluation [COPD] : COPD

## 2024-05-07 ENCOUNTER — APPOINTMENT (OUTPATIENT)
Dept: CARDIOLOGY | Facility: CLINIC | Age: 84
End: 2024-05-07
Payer: MEDICARE

## 2024-05-07 ENCOUNTER — NON-APPOINTMENT (OUTPATIENT)
Age: 84
End: 2024-05-07

## 2024-05-07 VITALS
BODY MASS INDEX: 31.18 KG/M2 | HEIGHT: 63 IN | DIASTOLIC BLOOD PRESSURE: 74 MMHG | SYSTOLIC BLOOD PRESSURE: 142 MMHG | WEIGHT: 176 LBS | HEART RATE: 72 BPM | OXYGEN SATURATION: 95 %

## 2024-05-07 VITALS — SYSTOLIC BLOOD PRESSURE: 134 MMHG | DIASTOLIC BLOOD PRESSURE: 62 MMHG

## 2024-05-07 DIAGNOSIS — R06.02 SHORTNESS OF BREATH: ICD-10-CM

## 2024-05-07 DIAGNOSIS — R94.31 ABNORMAL ELECTROCARDIOGRAM [ECG] [EKG]: ICD-10-CM

## 2024-05-07 DIAGNOSIS — I34.0 NONRHEUMATIC MITRAL (VALVE) INSUFFICIENCY: ICD-10-CM

## 2024-05-07 DIAGNOSIS — R60.0 LOCALIZED EDEMA: ICD-10-CM

## 2024-05-07 PROCEDURE — 93000 ELECTROCARDIOGRAM COMPLETE: CPT

## 2024-05-07 PROCEDURE — 99214 OFFICE O/P EST MOD 30 MIN: CPT

## 2024-05-07 RX ORDER — ALENDRONATE SODIUM 35 MG/1
35 TABLET ORAL
Refills: 0 | Status: DISCONTINUED | COMMUNITY
End: 2024-05-07

## 2024-05-15 RX ORDER — BUDESONIDE, GLYCOPYRROLATE, AND FORMOTEROL FUMARATE 160; 9; 4.8 UG/1; UG/1; UG/1
160-9-4.8 AEROSOL, METERED RESPIRATORY (INHALATION) TWICE DAILY
Qty: 3 | Refills: 0 | Status: ACTIVE | COMMUNITY
Start: 2023-05-10 | End: 1900-01-01

## 2024-05-23 ENCOUNTER — APPOINTMENT (OUTPATIENT)
Dept: CARDIOLOGY | Facility: CLINIC | Age: 84
End: 2024-05-23
Payer: MEDICARE

## 2024-05-23 PROCEDURE — 93306 TTE W/DOPPLER COMPLETE: CPT

## 2024-06-27 ENCOUNTER — NON-APPOINTMENT (OUTPATIENT)
Age: 84
End: 2024-06-27

## 2024-07-24 ENCOUNTER — NON-APPOINTMENT (OUTPATIENT)
Age: 84
End: 2024-07-24

## 2024-08-05 ENCOUNTER — NON-APPOINTMENT (OUTPATIENT)
Age: 84
End: 2024-08-05

## 2024-09-04 ENCOUNTER — TRANSCRIPTION ENCOUNTER (OUTPATIENT)
Age: 84
End: 2024-09-04

## 2024-09-23 ENCOUNTER — APPOINTMENT (OUTPATIENT)
Dept: CARDIOLOGY | Facility: CLINIC | Age: 84
End: 2024-09-23
Payer: MEDICARE

## 2024-09-23 PROCEDURE — 78452 HT MUSCLE IMAGE SPECT MULT: CPT

## 2024-09-23 PROCEDURE — A9502: CPT

## 2024-09-23 PROCEDURE — 93015 CV STRESS TEST SUPVJ I&R: CPT

## 2024-09-24 ENCOUNTER — NON-APPOINTMENT (OUTPATIENT)
Age: 84
End: 2024-09-24

## 2024-09-26 ENCOUNTER — TRANSCRIPTION ENCOUNTER (OUTPATIENT)
Age: 84
End: 2024-09-26

## 2024-10-08 ENCOUNTER — APPOINTMENT (OUTPATIENT)
Dept: PULMONOLOGY | Facility: CLINIC | Age: 84
End: 2024-10-08
Payer: MEDICARE

## 2024-10-08 VITALS
RESPIRATION RATE: 16 BRPM | DIASTOLIC BLOOD PRESSURE: 73 MMHG | HEART RATE: 72 BPM | OXYGEN SATURATION: 95 % | WEIGHT: 171 LBS | BODY MASS INDEX: 30.3 KG/M2 | SYSTOLIC BLOOD PRESSURE: 127 MMHG | HEIGHT: 63 IN

## 2024-10-08 DIAGNOSIS — R09.02 HYPOXEMIA: ICD-10-CM

## 2024-10-08 DIAGNOSIS — J44.9 CHRONIC OBSTRUCTIVE PULMONARY DISEASE, UNSPECIFIED: ICD-10-CM

## 2024-10-08 DIAGNOSIS — K21.9 GASTRO-ESOPHAGEAL REFLUX DISEASE W/OUT ESOPHAGITIS: ICD-10-CM

## 2024-10-08 DIAGNOSIS — R91.1 SOLITARY PULMONARY NODULE: ICD-10-CM

## 2024-10-08 DIAGNOSIS — Z87.891 PERSONAL HISTORY OF NICOTINE DEPENDENCE: ICD-10-CM

## 2024-10-08 PROCEDURE — G2211 COMPLEX E/M VISIT ADD ON: CPT

## 2024-10-08 PROCEDURE — 99213 OFFICE O/P EST LOW 20 MIN: CPT

## 2024-11-06 ENCOUNTER — APPOINTMENT (OUTPATIENT)
Dept: CARDIOLOGY | Facility: CLINIC | Age: 84
End: 2024-11-06

## 2024-11-08 ENCOUNTER — NON-APPOINTMENT (OUTPATIENT)
Age: 84
End: 2024-11-08

## 2024-11-08 ENCOUNTER — APPOINTMENT (OUTPATIENT)
Dept: CARDIOLOGY | Facility: CLINIC | Age: 84
End: 2024-11-08
Payer: MEDICARE

## 2024-11-08 VITALS
OXYGEN SATURATION: 97 % | WEIGHT: 172 LBS | HEIGHT: 63 IN | BODY MASS INDEX: 30.48 KG/M2 | HEART RATE: 66 BPM | SYSTOLIC BLOOD PRESSURE: 136 MMHG | DIASTOLIC BLOOD PRESSURE: 73 MMHG

## 2024-11-08 DIAGNOSIS — I42.0 DILATED CARDIOMYOPATHY: ICD-10-CM

## 2024-11-08 PROCEDURE — 93000 ELECTROCARDIOGRAM COMPLETE: CPT

## 2024-11-08 PROCEDURE — 99214 OFFICE O/P EST MOD 30 MIN: CPT

## 2024-11-12 RX ORDER — LOSARTAN POTASSIUM 25 MG/1
25 TABLET, FILM COATED ORAL
Qty: 30 | Refills: 2 | Status: DISCONTINUED | COMMUNITY
Start: 2024-11-08 | End: 2024-11-12

## 2024-12-12 ENCOUNTER — RX RENEWAL (OUTPATIENT)
Age: 84
End: 2024-12-12

## 2025-04-08 ENCOUNTER — APPOINTMENT (OUTPATIENT)
Dept: PULMONOLOGY | Facility: CLINIC | Age: 85
End: 2025-04-08
Payer: MEDICARE

## 2025-04-08 VITALS
DIASTOLIC BLOOD PRESSURE: 70 MMHG | RESPIRATION RATE: 16 BRPM | SYSTOLIC BLOOD PRESSURE: 130 MMHG | OXYGEN SATURATION: 96 % | HEART RATE: 70 BPM

## 2025-04-08 VITALS — BODY MASS INDEX: 30.12 KG/M2 | WEIGHT: 170 LBS | HEIGHT: 63 IN

## 2025-04-08 DIAGNOSIS — R91.1 SOLITARY PULMONARY NODULE: ICD-10-CM

## 2025-04-08 DIAGNOSIS — J44.9 CHRONIC OBSTRUCTIVE PULMONARY DISEASE, UNSPECIFIED: ICD-10-CM

## 2025-04-08 PROCEDURE — G2211 COMPLEX E/M VISIT ADD ON: CPT

## 2025-04-08 PROCEDURE — 99213 OFFICE O/P EST LOW 20 MIN: CPT

## 2025-07-14 ENCOUNTER — RX RENEWAL (OUTPATIENT)
Age: 85
End: 2025-07-14

## 2025-08-13 ENCOUNTER — APPOINTMENT (OUTPATIENT)
Dept: CARDIOLOGY | Facility: CLINIC | Age: 85
End: 2025-08-13

## 2025-08-15 ENCOUNTER — NON-APPOINTMENT (OUTPATIENT)
Age: 85
End: 2025-08-15

## 2025-08-15 ENCOUNTER — APPOINTMENT (OUTPATIENT)
Dept: CARDIOLOGY | Facility: CLINIC | Age: 85
End: 2025-08-15

## 2025-08-15 VITALS
HEIGHT: 63 IN | OXYGEN SATURATION: 98 % | WEIGHT: 175 LBS | HEART RATE: 71 BPM | DIASTOLIC BLOOD PRESSURE: 50 MMHG | BODY MASS INDEX: 31.01 KG/M2 | SYSTOLIC BLOOD PRESSURE: 144 MMHG

## 2025-08-15 DIAGNOSIS — I25.10 ATHEROSCLEROTIC HEART DISEASE OF NATIVE CORONARY ARTERY W/OUT ANGINA PECTORIS: ICD-10-CM

## 2025-08-15 DIAGNOSIS — I50.32 CHRONIC DIASTOLIC (CONGESTIVE) HEART FAILURE: ICD-10-CM

## 2025-08-15 DIAGNOSIS — I10 ESSENTIAL (PRIMARY) HYPERTENSION: ICD-10-CM

## 2025-08-15 DIAGNOSIS — I42.0 DILATED CARDIOMYOPATHY: ICD-10-CM

## 2025-08-15 PROCEDURE — 93000 ELECTROCARDIOGRAM COMPLETE: CPT

## 2025-08-15 PROCEDURE — 99214 OFFICE O/P EST MOD 30 MIN: CPT

## 2025-08-15 RX ORDER — MEDICAL SUPPLY, MISCELLANEOUS
EACH MISCELLANEOUS
Qty: 1 | Refills: 0 | Status: ACTIVE | COMMUNITY
Start: 2025-08-15 | End: 1900-01-01

## 2025-08-15 RX ORDER — VALSARTAN 40 MG/1
40 TABLET, COATED ORAL DAILY
Qty: 30 | Refills: 5 | Status: ACTIVE | COMMUNITY
Start: 2025-08-15 | End: 1900-01-01

## 2025-08-19 ENCOUNTER — NON-APPOINTMENT (OUTPATIENT)
Age: 85
End: 2025-08-19

## 2025-08-23 LAB
ANION GAP SERPL CALC-SCNC: 13 MMOL/L
BUN SERPL-MCNC: 23 MG/DL
CALCIUM SERPL-MCNC: 9.8 MG/DL
CHLORIDE SERPL-SCNC: 106 MMOL/L
CO2 SERPL-SCNC: 24 MMOL/L
CREAT SERPL-MCNC: 1.09 MG/DL
EGFRCR SERPLBLD CKD-EPI 2021: 50 ML/MIN/1.73M2
GLUCOSE SERPL-MCNC: 109 MG/DL
POTASSIUM SERPL-SCNC: 4.6 MMOL/L
SODIUM SERPL-SCNC: 143 MMOL/L

## 2025-08-25 ENCOUNTER — NON-APPOINTMENT (OUTPATIENT)
Age: 85
End: 2025-08-25

## 2025-09-08 ENCOUNTER — APPOINTMENT (OUTPATIENT)
Dept: VASCULAR SURGERY | Facility: CLINIC | Age: 85
End: 2025-09-08
Payer: MEDICARE

## 2025-09-08 VITALS
RESPIRATION RATE: 15 BRPM | HEART RATE: 64 BPM | OXYGEN SATURATION: 95 % | BODY MASS INDEX: 31.4 KG/M2 | TEMPERATURE: 98.1 F | HEIGHT: 62.5 IN | WEIGHT: 175 LBS | DIASTOLIC BLOOD PRESSURE: 70 MMHG | SYSTOLIC BLOOD PRESSURE: 138 MMHG

## 2025-09-08 DIAGNOSIS — I83.90 ASYMPTOMATIC VARICOSE VEINS OF UNSPECIFIED LOWER EXTREMITY: ICD-10-CM

## 2025-09-08 PROCEDURE — 99203 OFFICE O/P NEW LOW 30 MIN: CPT
